# Patient Record
Sex: FEMALE | Race: WHITE | NOT HISPANIC OR LATINO
[De-identification: names, ages, dates, MRNs, and addresses within clinical notes are randomized per-mention and may not be internally consistent; named-entity substitution may affect disease eponyms.]

---

## 2017-06-05 ENCOUNTER — APPOINTMENT (OUTPATIENT)
Dept: INTERNAL MEDICINE | Facility: CLINIC | Age: 82
End: 2017-06-05

## 2017-10-06 ENCOUNTER — EMERGENCY (EMERGENCY)
Facility: HOSPITAL | Age: 82
LOS: 1 days | Discharge: HOME | End: 2017-10-06
Admitting: EMERGENCY MEDICINE
Payer: MEDICARE

## 2017-10-06 VITALS
DIASTOLIC BLOOD PRESSURE: 85 MMHG | RESPIRATION RATE: 18 BRPM | SYSTOLIC BLOOD PRESSURE: 142 MMHG | WEIGHT: 125 LBS | HEART RATE: 76 BPM | OXYGEN SATURATION: 96 % | TEMPERATURE: 99 F

## 2017-10-06 VITALS
RESPIRATION RATE: 18 BRPM | HEART RATE: 63 BPM | SYSTOLIC BLOOD PRESSURE: 174 MMHG | OXYGEN SATURATION: 95 % | DIASTOLIC BLOOD PRESSURE: 75 MMHG

## 2017-10-06 DIAGNOSIS — I10 ESSENTIAL (PRIMARY) HYPERTENSION: ICD-10-CM

## 2017-10-06 DIAGNOSIS — F17.200 NICOTINE DEPENDENCE, UNSPECIFIED, UNCOMPLICATED: ICD-10-CM

## 2017-10-06 DIAGNOSIS — Z79.1 LONG TERM (CURRENT) USE OF NON-STEROIDAL ANTI-INFLAMMATORIES (NSAID): ICD-10-CM

## 2017-10-06 DIAGNOSIS — Z88.5 ALLERGY STATUS TO NARCOTIC AGENT: ICD-10-CM

## 2017-10-06 DIAGNOSIS — Z96.7 PRESENCE OF OTHER BONE AND TENDON IMPLANTS: Chronic | ICD-10-CM

## 2017-10-06 DIAGNOSIS — R10.9 UNSPECIFIED ABDOMINAL PAIN: ICD-10-CM

## 2017-10-06 DIAGNOSIS — Z79.899 OTHER LONG TERM (CURRENT) DRUG THERAPY: ICD-10-CM

## 2017-10-06 DIAGNOSIS — R05 COUGH: ICD-10-CM

## 2017-10-06 DIAGNOSIS — Z88.8 ALLERGY STATUS TO OTHER DRUGS, MEDICAMENTS AND BIOLOGICAL SUBSTANCES: ICD-10-CM

## 2017-10-06 DIAGNOSIS — J34.89 OTHER SPECIFIED DISORDERS OF NOSE AND NASAL SINUSES: ICD-10-CM

## 2017-10-06 DIAGNOSIS — Z98.89 OTHER SPECIFIED POSTPROCEDURAL STATES: Chronic | ICD-10-CM

## 2017-10-06 DIAGNOSIS — Z98.49 CATARACT EXTRACTION STATUS, UNSPECIFIED EYE: Chronic | ICD-10-CM

## 2017-10-06 LAB
ALBUMIN SERPL ELPH-MCNC: 3.7 G/DL — SIGNIFICANT CHANGE UP (ref 3.4–5)
ALP SERPL-CCNC: 57 U/L — SIGNIFICANT CHANGE UP (ref 40–120)
ALT FLD-CCNC: 18 U/L — SIGNIFICANT CHANGE UP (ref 12–42)
ANION GAP SERPL CALC-SCNC: 2 MMOL/L — LOW (ref 9–16)
APPEARANCE UR: CLEAR — SIGNIFICANT CHANGE UP
AST SERPL-CCNC: 15 U/L — SIGNIFICANT CHANGE UP (ref 15–37)
BILIRUB SERPL-MCNC: 0.3 MG/DL — SIGNIFICANT CHANGE UP (ref 0.2–1.2)
BILIRUB UR-MCNC: NEGATIVE — SIGNIFICANT CHANGE UP
BUN SERPL-MCNC: 19 MG/DL — SIGNIFICANT CHANGE UP (ref 7–23)
CALCIUM SERPL-MCNC: 8.8 MG/DL — SIGNIFICANT CHANGE UP (ref 8.5–10.5)
CHLORIDE SERPL-SCNC: 104 MMOL/L — SIGNIFICANT CHANGE UP (ref 96–108)
CO2 SERPL-SCNC: 32 MMOL/L — HIGH (ref 22–31)
COLOR SPEC: YELLOW — SIGNIFICANT CHANGE UP
CREAT SERPL-MCNC: 0.71 MG/DL — SIGNIFICANT CHANGE UP (ref 0.5–1.3)
DIFF PNL FLD: NEGATIVE — SIGNIFICANT CHANGE UP
GLUCOSE SERPL-MCNC: 104 MG/DL — HIGH (ref 70–99)
GLUCOSE UR QL: NEGATIVE — SIGNIFICANT CHANGE UP
HCT VFR BLD CALC: 42.6 % — SIGNIFICANT CHANGE UP (ref 34.5–45)
HGB BLD-MCNC: 14 G/DL — SIGNIFICANT CHANGE UP (ref 11.5–15.5)
INR BLD: 0.96 — SIGNIFICANT CHANGE UP (ref 0.88–1.16)
KETONES UR-MCNC: NEGATIVE — SIGNIFICANT CHANGE UP
LACTATE SERPL-SCNC: 0.7 MMOL/L — SIGNIFICANT CHANGE UP (ref 0.4–2)
LEUKOCYTE ESTERASE UR-ACNC: NEGATIVE — SIGNIFICANT CHANGE UP
LIDOCAIN IGE QN: 233 U/L — SIGNIFICANT CHANGE UP (ref 73–393)
MCHC RBC-ENTMCNC: 31.3 PG — SIGNIFICANT CHANGE UP (ref 27–34)
MCHC RBC-ENTMCNC: 32.9 G/DL — SIGNIFICANT CHANGE UP (ref 32–36)
MCV RBC AUTO: 95.3 FL — SIGNIFICANT CHANGE UP (ref 80–100)
NITRITE UR-MCNC: NEGATIVE — SIGNIFICANT CHANGE UP
PH UR: 5.5 — SIGNIFICANT CHANGE UP (ref 5–8)
PLATELET # BLD AUTO: 194 K/UL — SIGNIFICANT CHANGE UP (ref 150–400)
POTASSIUM SERPL-MCNC: 4.2 MMOL/L — SIGNIFICANT CHANGE UP (ref 3.5–5.3)
POTASSIUM SERPL-SCNC: 4.2 MMOL/L — SIGNIFICANT CHANGE UP (ref 3.5–5.3)
PROT SERPL-MCNC: 8.3 G/DL — HIGH (ref 6.4–8.2)
PROT UR-MCNC: NEGATIVE MG/DL — SIGNIFICANT CHANGE UP
PROTHROM AB SERPL-ACNC: 10.6 SEC — SIGNIFICANT CHANGE UP (ref 9.8–12.7)
RBC # BLD: 4.47 M/UL — SIGNIFICANT CHANGE UP (ref 3.8–5.2)
RBC # FLD: 14.4 % — SIGNIFICANT CHANGE UP (ref 10.3–16.9)
SODIUM SERPL-SCNC: 138 MMOL/L — SIGNIFICANT CHANGE UP (ref 132–145)
SP GR SPEC: 1.02 — SIGNIFICANT CHANGE UP (ref 1–1.03)
UROBILINOGEN FLD QL: 0.2 E.U./DL — SIGNIFICANT CHANGE UP
WBC # BLD: 4.5 K/UL — SIGNIFICANT CHANGE UP (ref 3.8–10.5)
WBC # FLD AUTO: 4.5 K/UL — SIGNIFICANT CHANGE UP (ref 3.8–10.5)

## 2017-10-06 PROCEDURE — 74177 CT ABD & PELVIS W/CONTRAST: CPT | Mod: 26

## 2017-10-06 PROCEDURE — 71100 X-RAY EXAM RIBS UNI 2 VIEWS: CPT | Mod: 26,LT

## 2017-10-06 PROCEDURE — 99283 EMERGENCY DEPT VISIT LOW MDM: CPT

## 2017-10-06 RX ORDER — SODIUM CHLORIDE 9 MG/ML
3 INJECTION INTRAMUSCULAR; INTRAVENOUS; SUBCUTANEOUS ONCE
Qty: 0 | Refills: 0 | Status: COMPLETED | OUTPATIENT
Start: 2017-10-06 | End: 2017-10-06

## 2017-10-06 RX ORDER — IOHEXOL 300 MG/ML
50 INJECTION, SOLUTION INTRAVENOUS ONCE
Qty: 0 | Refills: 0 | Status: COMPLETED | OUTPATIENT
Start: 2017-10-06 | End: 2017-10-06

## 2017-10-06 RX ADMIN — SODIUM CHLORIDE 3 MILLILITER(S): 9 INJECTION INTRAMUSCULAR; INTRAVENOUS; SUBCUTANEOUS at 19:18

## 2017-10-06 RX ADMIN — IOHEXOL 50 MILLILITER(S): 300 INJECTION, SOLUTION INTRAVENOUS at 20:20

## 2017-10-06 NOTE — ED ADULT TRIAGE NOTE - CHIEF COMPLAINT QUOTE
states she fell yesterday at 3am- states she fell onto her backside with no injury to left sided rib pain- Pt also reports being on zpack for cold symptoms- Pt c/o left sided rib pain which worsens on exertion states she fell yesterday at 3am- states she fell onto her backside with no injury to left sided rib pain- Pt also reports being on zpack for cold symptoms- Pt c/o left sided rib pain which worsens on exertion- denies cp, sob

## 2017-10-06 NOTE — ED PROVIDER NOTE - MEDICAL DECISION MAKING DETAILS
(FAZAL Chaudhry) Pt signed out to me by VANNA paul at 20:00pm pending CT results. CTAP reveals no acute intra-abdominal pathology, however does reveal a segment of narrowed appearance in the   proximal descending colon which pt was informed of in which she verbalizes her understanding and she agrees to F/U with her Gastroenterologist on Monday ( in 2 days), as well as her PMD, for further evaluation of this finding. Pt was provided with her images on a CD as well as a print-out of the reading. She is A&Ox3. NAD. Sitting comfortably. Will D/C. Strict return precautions reviewed with pt in which pt verbalizes understanding and agrees to.

## 2017-10-06 NOTE — ED PROVIDER NOTE - PMH
Aortic stenosis    Aortic valve replaced    Essential hypertension    Hypothyroid    Osteoporosis    Tibial fracture

## 2017-10-06 NOTE — ED PROVIDER NOTE - OBJECTIVE STATEMENT
85y F with hx of osteoporosis, hypothyroidism, aortic stenosis, HTN, presents to ED c/o left flank pain s/p fall. Pt states she fell in the middle of the night on her way to the bathroom and landed on her buttocks. Pt got back in bed, but is feeling left flank pain now, which is worse when she coughs, takes deep breaths. Pt notes she started a z-pack yesterday for her cold sx. Pt states she also has runny nose, and productive cough, with white mucous. Pt states she had broken her ribs before, and is concerned this may be related. 85y F with hx of osteoporosis, hypothyroidism, aortic stenosis, HTN, allergic to morphine, Demerol, Tequin, indomethacin,  codeine, percodan, presents to ED c/o left flank pain s/p fall. Pt states she fell in the middle of the night on her way to the bathroom and landed on her buttocks. Pt got back in bed, but is feeling left flank pain now, which is worse when she coughs, takes deep breaths. Pt notes she started a z-pack yesterday for her cold sx. Pt states she also has runny nose, and productive cough, with white mucous. Pt states she had broken her ribs before, and is concerned this may be related. 85y F with hx of osteoporosis, hypothyroidism, aortic stenosis s/p AVR 2009 and partial intestinal resection for secondary peritonitis in 77, HTN presents to ED c/o left anterior and lateral chest wall pain s/p fall. Pt states she fell in the middle of the night on her way to the bathroom and landed on her buttocks. Pt got back in bed, but is feeling left chest wall pain now, which is worse when she coughs, takes deep breaths. Pt notes she started a z-pack yesterday for her cold sx. Pt states she also has runny nose, and productive cough, with white sputum. Pt states she had broken her ribs before, and is concerned this may be related.  She denies fevers/chills, n/v, abd pain, shortness of breath.

## 2017-10-07 NOTE — ED ADULT NURSE NOTE - CHIEF COMPLAINT QUOTE
states she fell yesterday at 3am- states she fell onto her backside with no injury to left sided rib pain- Pt also reports being on zpack for cold symptoms- Pt c/o left sided rib pain which worsens on exertion- denies cp, sob

## 2018-05-16 ENCOUNTER — EMERGENCY (EMERGENCY)
Facility: HOSPITAL | Age: 83
LOS: 1 days | Discharge: HOME | End: 2018-05-16
Admitting: EMERGENCY MEDICINE
Payer: MEDICARE

## 2018-05-16 VITALS
OXYGEN SATURATION: 94 % | TEMPERATURE: 98 F | WEIGHT: 123.9 LBS | DIASTOLIC BLOOD PRESSURE: 79 MMHG | SYSTOLIC BLOOD PRESSURE: 151 MMHG | HEART RATE: 90 BPM | RESPIRATION RATE: 18 BRPM

## 2018-05-16 DIAGNOSIS — Z96.7 PRESENCE OF OTHER BONE AND TENDON IMPLANTS: Chronic | ICD-10-CM

## 2018-05-16 DIAGNOSIS — Z88.8 ALLERGY STATUS TO OTHER DRUGS, MEDICAMENTS AND BIOLOGICAL SUBSTANCES: ICD-10-CM

## 2018-05-16 DIAGNOSIS — E03.9 HYPOTHYROIDISM, UNSPECIFIED: ICD-10-CM

## 2018-05-16 DIAGNOSIS — Z87.891 PERSONAL HISTORY OF NICOTINE DEPENDENCE: ICD-10-CM

## 2018-05-16 DIAGNOSIS — Z98.89 OTHER SPECIFIED POSTPROCEDURAL STATES: Chronic | ICD-10-CM

## 2018-05-16 DIAGNOSIS — Z88.5 ALLERGY STATUS TO NARCOTIC AGENT: ICD-10-CM

## 2018-05-16 DIAGNOSIS — Z98.49 CATARACT EXTRACTION STATUS, UNSPECIFIED EYE: Chronic | ICD-10-CM

## 2018-05-16 DIAGNOSIS — Z79.899 OTHER LONG TERM (CURRENT) DRUG THERAPY: ICD-10-CM

## 2018-05-16 DIAGNOSIS — I10 ESSENTIAL (PRIMARY) HYPERTENSION: ICD-10-CM

## 2018-05-16 DIAGNOSIS — K44.9 DIAPHRAGMATIC HERNIA WITHOUT OBSTRUCTION OR GANGRENE: ICD-10-CM

## 2018-05-16 DIAGNOSIS — R10.12 LEFT UPPER QUADRANT PAIN: ICD-10-CM

## 2018-05-16 PROCEDURE — 71046 X-RAY EXAM CHEST 2 VIEWS: CPT | Mod: 26

## 2018-05-16 PROCEDURE — 93010 ELECTROCARDIOGRAM REPORT: CPT | Mod: 77

## 2018-05-16 PROCEDURE — 99284 EMERGENCY DEPT VISIT MOD MDM: CPT | Mod: 25

## 2018-05-16 PROCEDURE — 93010 ELECTROCARDIOGRAM REPORT: CPT

## 2018-05-16 RX ORDER — TRAMADOL HYDROCHLORIDE 50 MG/1
50 TABLET ORAL ONCE
Qty: 0 | Refills: 0 | Status: DISCONTINUED | OUTPATIENT
Start: 2018-05-16 | End: 2018-05-16

## 2018-05-16 RX ADMIN — TRAMADOL HYDROCHLORIDE 50 MILLIGRAM(S): 50 TABLET ORAL at 18:29

## 2018-05-16 NOTE — ED PROVIDER NOTE - PHYSICAL EXAMINATION
Vital Signs - nursing notes reviewed and confirmed  Gen - WDWN elderly F, NAD, comfortable and non-toxic appearing, speaking in full sentences   Skin - warm, dry, intact  HEENT - AT/NC, airway patent, neck supple and NT, FROM  CV - S1S2, R/R/R, +3/6 systolic murmur noted   Resp - respiration non-labored, CTAB, symmetric bs b/l, no r/r/w  GI - NABS, soft, ND, NT, no rebound or guarding, no CVAT b/l, no palpable bulging mass noted   MS - w/w/p, no c/c/e, calves supple and NT, distal pulses symmetric b/l   Neuro - AxOx3, no focal neuro deficits, ambulatory without gait disturbance

## 2018-05-16 NOTE — ED PROVIDER NOTE - MEDICAL DECISION MAKING DETAILS
AFVSS at time of d/c, pt non-toxic appearing, results, ddx, and f/u plans discussed with pt at bedside, d/c'd home to f/u with PMD, strict return precautions discussed, prompt return to ER for any worsening or new sx, pt verbalized understanding. pt with h/o hiatal hernia, noted intermittent pain in the epigastric/LUQ due to coughing and bulging of hernia, pain resolved in the ED, CXR and EKG wnl, pt reports feeling better and wants to f/u with GI, no palpable mass or discomfort on exam, well appearing, d/c'd home to f/u with PMD and GI, strict return precautions discussed, prompt return to ER for any worsening or new sx, pt verbalized understanding.

## 2018-05-16 NOTE — ED ADULT TRIAGE NOTE - CHIEF COMPLAINT QUOTE
c/o sudden onset of b/l rib pain x 30 min with SOB when inhaling hard. states pain is worse where her hernia is. pt is speaking in full sentences, in NAD.

## 2018-05-16 NOTE — ED PROVIDER NOTE - DIAGNOSTIC INTERPRETATION
Xray (wet reads) interpreted by DENVER VALENZUELA   CXR - Cardiac silhouette, aortic knob, mediastinal and hilar contours appear wnl, no acute consolidation, infiltrate, effusion, or PTX. No bony abnormalities noted

## 2018-05-16 NOTE — ED ADULT NURSE NOTE - OBJECTIVE STATEMENT
Pt states she has a hernia by her left side, area under her breast. Reports its hard to take a deep breath. hx of cardiac valve replacement.

## 2018-05-16 NOTE — ED PROVIDER NOTE - OBJECTIVE STATEMENT
86 yo F 84 yo F with PMHx of Aortic stenosis, s/p AVR, HTN, osteoporosis, hypothyroid, recently found to have gastric hernia, s/p EGD/Colonoscopy 1/2018, intermittently getting pain in the ribcage region with coughing due to hernia, presenting today c/o worsening pain 1 hr PTA to the ED. Pt reports walking briskly and noted sudden onset of pain in the LUQ/epigastric region after coughing and sneezing. Noted slight bulging in the region and feels like her hernia type of pain and couldn't make it home.  But pain resolved spontaneously upon arrival to the ED.  Currently reports no active sx except slight discomfort on deep inspiration. Denies fever, chills, CP, trauma, fall, SOB, ROA, palpitations, HA, dizziness, focal weakness, rash, numbness, tingling, abdominal pain, N/V/D/C, and malaise.

## 2018-05-16 NOTE — ED PROVIDER NOTE - PSH
S/P arthroscopic knee surgery    S/P cataract surgery    S/P ORIF (open reduction internal fixation) fracture

## 2018-07-29 ENCOUNTER — EMERGENCY (EMERGENCY)
Facility: HOSPITAL | Age: 83
LOS: 1 days | Discharge: HOME | End: 2018-07-29
Attending: EMERGENCY MEDICINE | Admitting: EMERGENCY MEDICINE
Payer: MEDICARE

## 2018-07-29 VITALS
HEART RATE: 85 BPM | DIASTOLIC BLOOD PRESSURE: 83 MMHG | TEMPERATURE: 98 F | RESPIRATION RATE: 15 BRPM | OXYGEN SATURATION: 95 % | WEIGHT: 126.1 LBS | SYSTOLIC BLOOD PRESSURE: 146 MMHG

## 2018-07-29 DIAGNOSIS — Z98.49 CATARACT EXTRACTION STATUS, UNSPECIFIED EYE: Chronic | ICD-10-CM

## 2018-07-29 DIAGNOSIS — Z96.7 PRESENCE OF OTHER BONE AND TENDON IMPLANTS: Chronic | ICD-10-CM

## 2018-07-29 DIAGNOSIS — Z98.89 OTHER SPECIFIED POSTPROCEDURAL STATES: Chronic | ICD-10-CM

## 2018-07-29 PROCEDURE — 99284 EMERGENCY DEPT VISIT MOD MDM: CPT

## 2018-07-29 PROCEDURE — 72192 CT PELVIS W/O DYE: CPT | Mod: 26

## 2018-07-29 RX ORDER — LIDOCAINE 4 G/100G
1 CREAM TOPICAL ONCE
Qty: 0 | Refills: 0 | Status: DISCONTINUED | OUTPATIENT
Start: 2018-07-29 | End: 2018-07-29

## 2018-07-29 NOTE — ED PROVIDER NOTE - MEDICAL DECISION MAKING DETAILS
well appearing pt, no trauma/fall, hx of bone harvesting to L iliac bone, no fever, no urinary sx, will ct pelvis eval for acute pathology, also could be referred pain from L hip though clinically bearing weight, low suspicion for occult fx, low suspicion for cord compression, no evidence of cellulitis or acute infectious process, if CT no acute findings, can follow up with PMD/orthopedics

## 2018-07-29 NOTE — ED PROVIDER NOTE - OBJECTIVE STATEMENT
85 yof pw left lower back pain which began yesterday, has had several episodes in the last 2-3 years.  sp iliac bone harvest in 90s for tibfib fx per patient.  pt came in this time b/c she's scheduled for surg on 8/2/18 and inquire whether or not this pain will affect her surg.  no fall, no trauma, no fever, no abd pain.  ambulates w/ single crutch, able to perform ADL, able to bend down.  no urinary sx.

## 2018-07-29 NOTE — ED PROVIDER NOTE - PHYSICAL EXAMINATION
CON: ao x 3, HENMT: clear oropharynx, soft neck, HEAD: atraumatic, SKIN: no rash, no erythema, no induration/fluctuance over tenderness, MSK: no deformities, tenderness over L iliac bone, noted indentation, no crepitus, pelvis stable, full active/passive bl hip ROM, steady gait, able to bend down CON: ao x 3, HENMT: clear oropharynx, soft neck, HEAD: atraumatic, SKIN: no rash, no erythema, no induration/fluctuance over tenderness, MSK: no deformities, tenderness over L iliac bone medial aspect, noted surgical scar, no crepitus, pelvis stable, full active/passive bl hip ROM, steady gait, able to bend down

## 2018-07-29 NOTE — ED ADULT TRIAGE NOTE - CHIEF COMPLAINT QUOTE
PT reports left lower back pain from site where she had bone removed from a surgery years ago. Pt states she has this pain intermittently but worse today.

## 2018-07-29 NOTE — ED PROVIDER NOTE - PROGRESS NOTE DETAILS
pt inquiring nature of her ongoing sx and regarding upcoming surg, though noted allergy to codeine, pt states has taken tramadol in the past w/o allergic rx

## 2018-07-30 VITALS
HEART RATE: 71 BPM | RESPIRATION RATE: 18 BRPM | DIASTOLIC BLOOD PRESSURE: 82 MMHG | TEMPERATURE: 99 F | OXYGEN SATURATION: 99 % | SYSTOLIC BLOOD PRESSURE: 136 MMHG

## 2018-07-30 RX ORDER — LIDOCAINE 4 G/100G
1 CREAM TOPICAL ONCE
Qty: 0 | Refills: 0 | Status: DISCONTINUED | OUTPATIENT
Start: 2018-07-30 | End: 2018-08-02

## 2018-07-30 RX ORDER — LIDOCAINE 4 G/100G
1 CREAM TOPICAL
Qty: 30 | Refills: 0
Start: 2018-07-30 | End: 2018-08-28

## 2018-07-30 RX ORDER — TRAMADOL HYDROCHLORIDE 50 MG/1
0.5 TABLET ORAL
Qty: 3 | Refills: 0
Start: 2018-07-30 | End: 2018-08-01

## 2018-08-02 DIAGNOSIS — Z79.899 OTHER LONG TERM (CURRENT) DRUG THERAPY: ICD-10-CM

## 2018-08-02 DIAGNOSIS — M54.5 LOW BACK PAIN: ICD-10-CM

## 2018-08-02 DIAGNOSIS — E03.9 HYPOTHYROIDISM, UNSPECIFIED: ICD-10-CM

## 2018-08-02 DIAGNOSIS — Z88.5 ALLERGY STATUS TO NARCOTIC AGENT: ICD-10-CM

## 2018-08-02 DIAGNOSIS — Z88.8 ALLERGY STATUS TO OTHER DRUGS, MEDICAMENTS AND BIOLOGICAL SUBSTANCES: ICD-10-CM

## 2019-08-17 ENCOUNTER — EMERGENCY (EMERGENCY)
Facility: HOSPITAL | Age: 84
LOS: 1 days | Discharge: HOME | End: 2019-08-17
Admitting: EMERGENCY MEDICINE
Payer: MEDICARE

## 2019-08-17 VITALS
OXYGEN SATURATION: 94 % | RESPIRATION RATE: 15 BRPM | TEMPERATURE: 98 F | DIASTOLIC BLOOD PRESSURE: 56 MMHG | SYSTOLIC BLOOD PRESSURE: 130 MMHG | HEART RATE: 72 BPM

## 2019-08-17 DIAGNOSIS — Z96.7 PRESENCE OF OTHER BONE AND TENDON IMPLANTS: Chronic | ICD-10-CM

## 2019-08-17 DIAGNOSIS — Z98.89 OTHER SPECIFIED POSTPROCEDURAL STATES: Chronic | ICD-10-CM

## 2019-08-17 DIAGNOSIS — Z98.49 CATARACT EXTRACTION STATUS, UNSPECIFIED EYE: Chronic | ICD-10-CM

## 2019-08-17 PROCEDURE — 99283 EMERGENCY DEPT VISIT LOW MDM: CPT

## 2019-08-17 RX ORDER — BACITRACIN ZINC 500 UNIT/G
1 OINTMENT IN PACKET (EA) TOPICAL ONCE
Refills: 0 | Status: COMPLETED | OUTPATIENT
Start: 2019-08-17 | End: 2019-08-17

## 2019-08-17 RX ORDER — TETANUS TOXOID, REDUCED DIPHTHERIA TOXOID AND ACELLULAR PERTUSSIS VACCINE, ADSORBED 5; 2.5; 8; 8; 2.5 [IU]/.5ML; [IU]/.5ML; UG/.5ML; UG/.5ML; UG/.5ML
0.5 SUSPENSION INTRAMUSCULAR ONCE
Refills: 0 | Status: COMPLETED | OUTPATIENT
Start: 2019-08-17 | End: 2019-08-17

## 2019-08-17 RX ADMIN — Medication 1 TABLET(S): at 01:42

## 2019-08-17 RX ADMIN — Medication 1 APPLICATION(S): at 01:41

## 2019-08-17 RX ADMIN — TETANUS TOXOID, REDUCED DIPHTHERIA TOXOID AND ACELLULAR PERTUSSIS VACCINE, ADSORBED 0.5 MILLILITER(S): 5; 2.5; 8; 8; 2.5 SUSPENSION INTRAMUSCULAR at 02:11

## 2019-08-17 NOTE — ED PROVIDER NOTE - NSFOLLOWUPINSTRUCTIONS_ED_ALL_ED_FT
-PLEASE FOLLOW-UP WITH YOUR PRIMARY CARE DOCTOR IN 1-2 DAYS.  BRING ALL PAPERWORK FROM TODAY'S VISIT TO YOUR FOLLOW-UP VISIT.  IF YOU DO NOT HAVE A PRIMARY CARE DOCTOR PLEASE REFER TO THE OFFICE/CLINIC INFORMATION GIVEN ABOVE.  YOU MAY ALSO CALL [phone] AND ASK FOR MS. LULY ROBLERO.  SHE CAN HELP YOU MAKE A FOLLOW-UP APPOINTMENT.  HER HOURS ARE 11AM-7PM MONDAY - FRIDAY.  -TAKE AUGMENTIN AS DIRECTED.  -PLEASE RETURN TO THE ER IMMEDIATELY OR CALL 911 FOR ANY HIGH FEVER, TROUBLE BREATHING, VOMITING, SEVERE PAIN, OR ANY OTHER CONCERNS.     Animal Bite    Animal bites can range from mild to serious. An animal bite can result in a scratch on the skin, a deep open cut, a puncture of the skin, a crush injury, or tearing away of the skin or a body part. Treatment includes wound care, updating your tetanus shot, and in some cases, administering a rabies vaccine. If you were prescribed an antibiotic, take it as told by your health care provider. Do not stop using the antibiotic even if your condition improves.      SEEK IMMEDIATE MEDICAL CARE IF YOU HAVE ANY OF THE FOLLOWING SYMPTOMS: red streaking away from the wound, fluid/blood/pus coming from the wound, fever or chills, trouble moving the injured area, numbness or tingling extending beyond the wound.    Cellulitis    Cellulitis is a skin infection caused by bacteria. This condition occurs most often in the arms and lower legs but can occur anywhere over the body. Symptoms include redness, swelling, warm skin, tenderness, and chills/fever. If you were prescribed an antibiotic medicine, take it as told by your health care provider. Do not stop taking the antibiotic even if you start to feel better.    SEEK IMMEDIATE MEDICAL CARE IF YOU HAVE ANY OF THE FOLLOWING SYMPTOMS: worsening fever, red streaks coming from affected area, vomiting or diarrhea, or dizziness/lightheadedness.

## 2019-08-17 NOTE — ED PROVIDER NOTE - PHYSICAL EXAMINATION
VITAL SIGNS: I have reviewed nursing notes and confirm.  CONSTITUTIONAL: Well-developed; well-nourished; in no acute distress.  SKIN: +[id number] circular area to posterior R forearm of erythema, edema, and mild warmth. Mild TTP to deep palpation. No crepitus, fluctuance, discharge. +puncture wound centrally. FROM of elbow/wrist/hand. 5/5 hand . sensation intact. +2 distal pulses. <2 sec capillary refill.   HEAD: Normocephalic; atraumatic.  EYES: PERRL, EOM intact; conjunctiva and sclera clear.  ENT: No nasal discharge; airway clear.  NECK: Supple; non tender.  CARD: S1, S2 normal; no murmurs, gallops, or rubs. Regular rate and rhythm.  RESP: No wheezes, rales or rhonchi.  ABD: Normal bowel sounds; soft; non-distended; non-tender; no hepatosplenomegaly.  EXT: Normal ROM. No clubbing, cyanosis or edema.  NEURO: Alert, oriented. Grossly unremarkable.  PSYCH: Cooperative, appropriate.

## 2019-08-17 NOTE — ED PROVIDER NOTE - CLINICAL SUMMARY MEDICAL DECISION MAKING FREE TEXT BOX
Cat bite 2 days ago-pt owns cat and it is up-to-date on vaccinations. Last tetanus 2007- will; update today. +mild cellulitis surrounding puncture wound to R forearm. No streaking or signs of systemic infection. Will Give Augmentin and DC with strict return precautions. Pt to f/u with PCP on monday.

## 2019-08-17 NOTE — ED PROVIDER NOTE - OBJECTIVE STATEMENT
88 y/o   pmhx: aortic valve replacement, HTN, hypothyroid    Pt presents with c/o cat bite to R forearm 2 days ago. Pt states it is her cat- and he is up-to-date on his vaccinations. Now with some erythema, edema, warmth and very mild tenderness to R forearm. Visible puncture wound. denies weakness, numbness, fevers, chills. No other medical complaint

## 2019-08-17 NOTE — ED PROVIDER NOTE - CARE PLAN
Principal Discharge DX:	Cat bite, initial encounter  Secondary Diagnosis:	Cellulitis of right upper extremity

## 2019-08-17 NOTE — ED ADULT TRIAGE NOTE - CHIEF COMPLAINT QUOTE
Pt with complaint of a bite to the right lower arm from her cat. Noted to have swelling to affected area.

## 2019-08-17 NOTE — ED ADULT NURSE NOTE - NSIMPLEMENTINTERV_GEN_ALL_ED
Implemented All Universal Safety Interventions:  [place] to call system. Call bell, personal items and telephone within reach. Instruct patient to call for assistance. Room bathroom lighting operational. Non-slip footwear when patient is off stretcher. Physically safe environment: no spills, clutter or unnecessary equipment. Stretcher in lowest position, wheels locked, appropriate side rails in place.

## 2019-08-22 DIAGNOSIS — S51.851A OPEN BITE OF RIGHT FOREARM, INITIAL ENCOUNTER: ICD-10-CM

## 2019-08-22 DIAGNOSIS — Z23 ENCOUNTER FOR IMMUNIZATION: ICD-10-CM

## 2019-08-22 DIAGNOSIS — L03.113 CELLULITIS OF RIGHT UPPER LIMB: ICD-10-CM
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
## 2019-11-23 ENCOUNTER — INPATIENT (INPATIENT)
Facility: HOSPITAL | Age: 84
LOS: 3 days | Discharge: HOME | DRG: 286 | End: 2019-11-27
Attending: INTERNAL MEDICINE | Admitting: INTERNAL MEDICINE
Payer: MEDICARE

## 2019-11-23 VITALS
TEMPERATURE: 98 F | SYSTOLIC BLOOD PRESSURE: 115 MMHG | RESPIRATION RATE: 17 BRPM | OXYGEN SATURATION: 96 % | HEIGHT: 62 IN | HEART RATE: 200 BPM | WEIGHT: 130.07 LBS | DIASTOLIC BLOOD PRESSURE: 60 MMHG

## 2019-11-23 DIAGNOSIS — Z95.3 PRESENCE OF XENOGENIC HEART VALVE: ICD-10-CM

## 2019-11-23 DIAGNOSIS — I48.4 ATYPICAL ATRIAL FLUTTER: ICD-10-CM

## 2019-11-23 DIAGNOSIS — Z98.89 OTHER SPECIFIED POSTPROCEDURAL STATES: Chronic | ICD-10-CM

## 2019-11-23 DIAGNOSIS — Z95.2 PRESENCE OF PROSTHETIC HEART VALVE: Chronic | ICD-10-CM

## 2019-11-23 DIAGNOSIS — R79.1 ABNORMAL COAGULATION PROFILE: ICD-10-CM

## 2019-11-23 DIAGNOSIS — Z96.7 PRESENCE OF OTHER BONE AND TENDON IMPLANTS: Chronic | ICD-10-CM

## 2019-11-23 DIAGNOSIS — Z98.49 CATARACT EXTRACTION STATUS, UNSPECIFIED EYE: Chronic | ICD-10-CM

## 2019-11-23 DIAGNOSIS — M81.0 AGE-RELATED OSTEOPOROSIS WITHOUT CURRENT PATHOLOGICAL FRACTURE: ICD-10-CM

## 2019-11-23 LAB
ALBUMIN SERPL ELPH-MCNC: 3.3 G/DL — LOW (ref 3.4–5)
ALP SERPL-CCNC: 54 U/L — SIGNIFICANT CHANGE UP (ref 40–120)
ALT FLD-CCNC: 58 U/L — HIGH (ref 12–42)
ANION GAP SERPL CALC-SCNC: 15 MMOL/L — SIGNIFICANT CHANGE UP (ref 9–16)
APTT BLD: 28.9 SEC — SIGNIFICANT CHANGE UP (ref 27.5–36.3)
AST SERPL-CCNC: 94 U/L — HIGH (ref 15–37)
BASOPHILS # BLD AUTO: 0.04 K/UL — SIGNIFICANT CHANGE UP (ref 0–0.2)
BASOPHILS NFR BLD AUTO: 0.4 % — SIGNIFICANT CHANGE UP (ref 0–2)
BILIRUB SERPL-MCNC: 0.3 MG/DL — SIGNIFICANT CHANGE UP (ref 0.2–1.2)
BUN SERPL-MCNC: 35 MG/DL — HIGH (ref 7–23)
CALCIUM SERPL-MCNC: 9.5 MG/DL — SIGNIFICANT CHANGE UP (ref 8.5–10.5)
CHLORIDE SERPL-SCNC: 110 MMOL/L — HIGH (ref 96–108)
CO2 SERPL-SCNC: 23 MMOL/L — SIGNIFICANT CHANGE UP (ref 22–31)
CREAT SERPL-MCNC: 1.32 MG/DL — HIGH (ref 0.5–1.3)
EOSINOPHIL # BLD AUTO: 0.02 K/UL — SIGNIFICANT CHANGE UP (ref 0–0.5)
EOSINOPHIL NFR BLD AUTO: 0.2 % — SIGNIFICANT CHANGE UP (ref 0–6)
GLUCOSE SERPL-MCNC: 173 MG/DL — HIGH (ref 70–99)
HCT VFR BLD CALC: 39.1 % — SIGNIFICANT CHANGE UP (ref 34.5–45)
HGB BLD-MCNC: 13.1 G/DL — SIGNIFICANT CHANGE UP (ref 11.5–15.5)
IMM GRANULOCYTES NFR BLD AUTO: 0.3 % — SIGNIFICANT CHANGE UP (ref 0–1.5)
INR BLD: 0.95 — SIGNIFICANT CHANGE UP (ref 0.88–1.16)
LYMPHOCYTES # BLD AUTO: 1.11 K/UL — SIGNIFICANT CHANGE UP (ref 1–3.3)
LYMPHOCYTES # BLD AUTO: 11 % — LOW (ref 13–44)
MAGNESIUM SERPL-MCNC: 1.9 MG/DL — SIGNIFICANT CHANGE UP (ref 1.6–2.6)
MCHC RBC-ENTMCNC: 33.2 PG — SIGNIFICANT CHANGE UP (ref 27–34)
MCHC RBC-ENTMCNC: 33.5 GM/DL — SIGNIFICANT CHANGE UP (ref 32–36)
MCV RBC AUTO: 99 FL — SIGNIFICANT CHANGE UP (ref 80–100)
MONOCYTES # BLD AUTO: 0.51 K/UL — SIGNIFICANT CHANGE UP (ref 0–0.9)
MONOCYTES NFR BLD AUTO: 5 % — SIGNIFICANT CHANGE UP (ref 2–14)
NEUTROPHILS # BLD AUTO: 8.4 K/UL — HIGH (ref 1.8–7.4)
NEUTROPHILS NFR BLD AUTO: 83.1 % — HIGH (ref 43–77)
NRBC # BLD: 0 /100 WBCS — SIGNIFICANT CHANGE UP (ref 0–0)
NT-PROBNP SERPL-SCNC: 597 PG/ML — HIGH
PLATELET # BLD AUTO: 173 K/UL — SIGNIFICANT CHANGE UP (ref 150–400)
POTASSIUM SERPL-MCNC: 4.2 MMOL/L — SIGNIFICANT CHANGE UP (ref 3.5–5.3)
POTASSIUM SERPL-SCNC: 4.2 MMOL/L — SIGNIFICANT CHANGE UP (ref 3.5–5.3)
PROT SERPL-MCNC: 7.2 G/DL — SIGNIFICANT CHANGE UP (ref 6.4–8.2)
PROTHROM AB SERPL-ACNC: 10.4 SEC — SIGNIFICANT CHANGE UP (ref 10–12.9)
RBC # BLD: 3.95 M/UL — SIGNIFICANT CHANGE UP (ref 3.8–5.2)
RBC # FLD: 13.7 % — SIGNIFICANT CHANGE UP (ref 10.3–14.5)
SODIUM SERPL-SCNC: 148 MMOL/L — HIGH (ref 132–145)
TROPONIN I SERPL-MCNC: 1.14 NG/ML — CRITICAL HIGH (ref 0.02–0.06)
TSH SERPL-MCNC: 0.17 UIU/ML — LOW (ref 0.36–3.74)
WBC # BLD: 10.11 K/UL — SIGNIFICANT CHANGE UP (ref 3.8–10.5)
WBC # FLD AUTO: 10.11 K/UL — SIGNIFICANT CHANGE UP (ref 3.8–10.5)

## 2019-11-23 PROCEDURE — 99292 CRITICAL CARE ADDL 30 MIN: CPT

## 2019-11-23 PROCEDURE — 71045 X-RAY EXAM CHEST 1 VIEW: CPT | Mod: 26

## 2019-11-23 PROCEDURE — 99222 1ST HOSP IP/OBS MODERATE 55: CPT

## 2019-11-23 PROCEDURE — 99291 CRITICAL CARE FIRST HOUR: CPT

## 2019-11-23 PROCEDURE — 93010 ELECTROCARDIOGRAM REPORT: CPT

## 2019-11-23 RX ORDER — DILTIAZEM HCL 120 MG
60 CAPSULE, EXT RELEASE 24 HR ORAL ONCE
Refills: 0 | Status: COMPLETED | OUTPATIENT
Start: 2019-11-23 | End: 2019-11-23

## 2019-11-23 RX ORDER — HEPARIN SODIUM 5000 [USP'U]/ML
3500 INJECTION INTRAVENOUS; SUBCUTANEOUS EVERY 6 HOURS
Refills: 0 | Status: DISCONTINUED | OUTPATIENT
Start: 2019-11-23 | End: 2019-11-24

## 2019-11-23 RX ORDER — DILTIAZEM HCL 120 MG
10 CAPSULE, EXT RELEASE 24 HR ORAL ONCE
Refills: 0 | Status: COMPLETED | OUTPATIENT
Start: 2019-11-23 | End: 2019-11-23

## 2019-11-23 RX ORDER — TRAMADOL HYDROCHLORIDE 50 MG/1
50 TABLET ORAL ONCE
Refills: 0 | Status: DISCONTINUED | OUTPATIENT
Start: 2019-11-23 | End: 2019-11-23

## 2019-11-23 RX ORDER — HEPARIN SODIUM 5000 [USP'U]/ML
INJECTION INTRAVENOUS; SUBCUTANEOUS
Qty: 25000 | Refills: 0 | Status: DISCONTINUED | OUTPATIENT
Start: 2019-11-23 | End: 2019-11-24

## 2019-11-23 RX ORDER — DILTIAZEM HCL 120 MG
20 CAPSULE, EXT RELEASE 24 HR ORAL ONCE
Refills: 0 | Status: COMPLETED | OUTPATIENT
Start: 2019-11-23 | End: 2019-11-23

## 2019-11-23 RX ORDER — HEPARIN SODIUM 5000 [USP'U]/ML
3500 INJECTION INTRAVENOUS; SUBCUTANEOUS ONCE
Refills: 0 | Status: COMPLETED | OUTPATIENT
Start: 2019-11-23 | End: 2019-11-23

## 2019-11-23 RX ORDER — AMIODARONE HYDROCHLORIDE 400 MG/1
1 TABLET ORAL
Qty: 900 | Refills: 0 | Status: DISCONTINUED | OUTPATIENT
Start: 2019-11-23 | End: 2019-11-25

## 2019-11-23 RX ADMIN — AMIODARONE HYDROCHLORIDE 600 MILLIGRAM(S): 400 TABLET ORAL at 21:30

## 2019-11-23 RX ADMIN — ADENOSINE 6 MILLIGRAM(S): 3 INJECTION INTRAVENOUS at 21:24

## 2019-11-23 RX ADMIN — TRAMADOL HYDROCHLORIDE 50 MILLIGRAM(S): 50 TABLET ORAL at 22:15

## 2019-11-23 RX ADMIN — TRAMADOL HYDROCHLORIDE 50 MILLIGRAM(S): 50 TABLET ORAL at 23:00

## 2019-11-23 RX ADMIN — Medication 20 MILLIGRAM(S): at 22:45

## 2019-11-23 RX ADMIN — HEPARIN SODIUM 700 UNIT(S)/HR: 5000 INJECTION INTRAVENOUS; SUBCUTANEOUS at 22:59

## 2019-11-23 RX ADMIN — Medication 10 MILLIGRAM(S): at 21:36

## 2019-11-23 RX ADMIN — Medication 60 MILLIGRAM(S): at 22:35

## 2019-11-23 RX ADMIN — HEPARIN SODIUM 3500 UNIT(S): 5000 INJECTION INTRAVENOUS; SUBCUTANEOUS at 22:56

## 2019-11-23 RX ADMIN — ADENOSINE 12 MILLIGRAM(S): 3 INJECTION INTRAVENOUS at 21:27

## 2019-11-23 NOTE — ED PROVIDER NOTE - ST/T WAVE
No ST/T wave elevations Nonspecific ST and T wave elevation. Questionable ST depression to Lead II and III, poor baseline poor baseline. Nonspecific ST/T wave elevations.

## 2019-11-23 NOTE — ED PROVIDER NOTE - CARE PLAN
Principal Discharge DX:	SVT (supraventricular tachycardia)  Secondary Diagnosis:	H/O aortic valve replacement  Secondary Diagnosis:	Atrial fibrillation with rapid ventricular response

## 2019-11-23 NOTE — ED ADULT NURSE NOTE - OBJECTIVE STATEMENT
87y female presents to ED c/o tachycardia in 200's. Given [id number] of adenosine en route my EMS without improvement to HR. On arrival, patient c/o L sided CP radiating to R chest and pain in bilateral arms. Placed on cardiac telemetry and cont pulse ox, provider and RN at bedside. 87y female presents to ED c/o SVT that began around 530p tonight. Given 9kw-[id number]-20cm of adenosine en route my EMS without improvement to HR. Hx of aortic valve repair x10 years ago. On arrival, patient c/o L sided CP radiating to R chest, pain in bilateral arms and generalized abdomen. Placed on cardiac telemetry and cont pulse ox, provider and RN at bedside.

## 2019-11-23 NOTE — ED PROVIDER NOTE - PROGRESS NOTE DETAILS
on arrival. /60. Adenosine 6mg push given. , /60, SPO2 96% Adenosine 12mg push given. Vagal maneuvers attempted. , /66, SPO2 96%. Amnion administered. , /66, SPO2  94%. HR has increased to 168 bpm. , /66, SPO2 90% Spoke to cardiologist, Dr. Adame to discuss pt's case. Recommended Cardizem 10mg push and that a Cardizem drip be started. Cardizem 10mg push given. Cardizem IV drip started. HR 93, /54, SPO2 97% Pt states she is feeling about the same since initial onset of sx. Still complaining of chest pain. States that she is not allergic to Tramadol. Will give Tramadol for pain. Amiodarone bolus administered. Cardizem IV drip started. HR 93, /54, SPO2 97%. Spoke to [facility] transfer center and cardiologist, Dr. Abebe, to discuss case. Recommended pt be started on a Heparin drip. Spoke to cardiologist, Dr. Adame to discuss pt's case. Recommended Cardizem. HR 93, /54, SPO2 97%. Spoke to [facility] transfer center and cardiologist, Dr. Abebe, to discuss case. Recommended pt be started on a Heparin drip, amiodarone drip. pt noted to be in SVT, given cardizem 20mg IVP. Rate improved to 100-110 AFIB. /74. Stable for transfer. signed out to cardiology fellow.

## 2019-11-23 NOTE — ED PROVIDER NOTE - PSH
H/O aortic valve replacement    S/P arthroscopic knee surgery    S/P cataract surgery    S/P ORIF (open reduction internal fixation) fracture

## 2019-11-23 NOTE — ED ADULT TRIAGE NOTE - CHIEF COMPLAINT QUOTE
PT presents to ED biba as notification for SVT with heart rate in 200's. IV adenosine 12mg, 6mg, 6mg given en route by EMS without improvement. Pt immediately placed in exam room with ER attending and RN at bedside, EKG in progress.

## 2019-11-23 NOTE — ED ADULT TRIAGE NOTE - IDEAL BODY WEIGHT(KG)
"Pt states \"I was trying to hook up a tractor and I fell off, a bolt caught the back of my left calf.\"     Duyen Neal, RN  10/12/19 7737    "
Left leg lac dressed with triple antibiotic ointment and kerlix     Suzie Kendrick  10/12/19 [id number]    
50

## 2019-11-23 NOTE — ED PROVIDER NOTE - CONSTITUTIONAL, MLM
normal... Frail, elderly female, uncomfortable appearing, well nourished, awake, alert, oriented to person, place, time/situation and in no apparent distress.

## 2019-11-23 NOTE — ED PROVIDER NOTE - CHPI ED SYMPTOMS NEG
no fever/no nausea/no syncope/no chills/no shortness of breath/no vomiting/no dizziness/no numbness, no tingling, no aphasia, no headache

## 2019-11-23 NOTE — ED PROVIDER NOTE - OBJECTIVE STATEMENT
87 y o female with PMHX of aortic valve replacement (December 21, 2010), HTN, and hypothyroid was BIBA for SVT that started at around 5:30pm today. HR in the 200's on the field. Adenosine 12mg, 6mg, 6mg given en route by EMS without improvement. Pt states that she is usually not aware of her heartbeat but endorses feeling sudden pain across her chest, posterior shoulders, arms, and abdomen. Notes associated cold sweat. Denies other cardiac hx or risk factors. Denies blood thinner use. Pt believes that she has had a constant increased HR since initial onset. Denies N/V/D, headache, syncope, SOB, numbness, tingling, aphasia, or other sx. Denies other cardiac hx or risk factors.

## 2019-11-23 NOTE — ED ADULT NURSE REASSESSMENT NOTE - NS ED NURSE REASSESS COMMENT FT1
6mg-12mg IV adenosine given without improvement to HR, remains in 190-200's. MD [place]de aware, amiodarone 150mg drip initiated per order. Patient alert and oriented, speaking full sentences, breathing equal bilaterally. cardiac telemetry maintained.

## 2019-11-23 NOTE — ED ADULT NURSE NOTE - NSIMPLEMENTINTERV_GEN_ALL_ED
Implemented All Fall Risk Interventions:  [place] to call system. Call bell, personal items and telephone within reach. Instruct patient to call for assistance. Room bathroom lighting operational. Non-slip footwear when patient is off stretcher. Physically safe environment: no spills, clutter or unnecessary equipment. Stretcher in lowest position, wheels locked, appropriate side rails in place. Provide visual cue, wrist band, yellow gown, etc. Monitor gait and stability. Monitor for mental status changes and reorient to person, place, and time. Review medications for side effects contributing to fall risk. Reinforce activity limits and safety measures with patient and family.

## 2019-11-23 NOTE — ED ADULT NURSE REASSESSMENT NOTE - NS ED NURSE REASSESS COMMENT FT1
Patient maintained on cardiac telemetry, HR in 190's. Patient speaking full sentences, endorsing chest pain and bilateral arm pain partially relieved s/p tramadol. MD at bedside, 20mg of cardizem given, amiodarone drip initiated.

## 2019-11-23 NOTE — ED PROVIDER NOTE - CLINICAL SUMMARY MEDICAL DECISION MAKING FREE TEXT BOX
87 y.o. female  with h/o AVR 10 years ago with SVT, converted to AFIB with RVR, responded to pharmacologic cardioversion in ED. Accepted for admission to cardiology by dr hernandez, heparin drip, amio drip in ED, cardizem PRN.

## 2019-11-24 DIAGNOSIS — N17.9 ACUTE KIDNEY FAILURE, UNSPECIFIED: ICD-10-CM

## 2019-11-24 DIAGNOSIS — Z91.89 OTHER SPECIFIED PERSONAL RISK FACTORS, NOT ELSEWHERE CLASSIFIED: ICD-10-CM

## 2019-11-24 DIAGNOSIS — R63.8 OTHER SYMPTOMS AND SIGNS CONCERNING FOOD AND FLUID INTAKE: ICD-10-CM

## 2019-11-24 DIAGNOSIS — I24.9 ACUTE ISCHEMIC HEART DISEASE, UNSPECIFIED: ICD-10-CM

## 2019-11-24 DIAGNOSIS — I48.91 UNSPECIFIED ATRIAL FIBRILLATION: ICD-10-CM

## 2019-11-24 DIAGNOSIS — F19.10 OTHER PSYCHOACTIVE SUBSTANCE ABUSE, UNCOMPLICATED: ICD-10-CM

## 2019-11-24 DIAGNOSIS — Z95.2 PRESENCE OF PROSTHETIC HEART VALVE: ICD-10-CM

## 2019-11-24 DIAGNOSIS — I47.1 SUPRAVENTRICULAR TACHYCARDIA: ICD-10-CM

## 2019-11-24 LAB
ALBUMIN SERPL ELPH-MCNC: 3.9 G/DL — SIGNIFICANT CHANGE UP (ref 3.3–5)
ALP SERPL-CCNC: 53 U/L — SIGNIFICANT CHANGE UP (ref 40–120)
ALT FLD-CCNC: 71 U/L — HIGH (ref 10–45)
ANION GAP SERPL CALC-SCNC: 12 MMOL/L — SIGNIFICANT CHANGE UP (ref 5–17)
APPEARANCE UR: CLEAR — SIGNIFICANT CHANGE UP
APTT BLD: 69.2 SEC — HIGH (ref 27.5–36.3)
APTT BLD: 74.7 SEC — HIGH (ref 27.5–36.3)
APTT BLD: 88.8 SEC — HIGH (ref 27.5–36.3)
AST SERPL-CCNC: 227 U/L — HIGH (ref 10–40)
BACTERIA # UR AUTO: PRESENT /HPF
BASOPHILS # BLD AUTO: 0.04 K/UL — SIGNIFICANT CHANGE UP (ref 0–0.2)
BASOPHILS NFR BLD AUTO: 0.4 % — SIGNIFICANT CHANGE UP (ref 0–2)
BILIRUB DIRECT SERPL-MCNC: <0.2 MG/DL — SIGNIFICANT CHANGE UP (ref 0–0.2)
BILIRUB INDIRECT FLD-MCNC: >0.2 MG/DL — SIGNIFICANT CHANGE UP (ref 0.2–1)
BILIRUB SERPL-MCNC: 0.4 MG/DL — SIGNIFICANT CHANGE UP (ref 0.2–1.2)
BILIRUB UR-MCNC: NEGATIVE — SIGNIFICANT CHANGE UP
BUN SERPL-MCNC: 29 MG/DL — HIGH (ref 7–23)
CALCIUM SERPL-MCNC: 9.1 MG/DL — SIGNIFICANT CHANGE UP (ref 8.4–10.5)
CHLORIDE SERPL-SCNC: 106 MMOL/L — SIGNIFICANT CHANGE UP (ref 96–108)
CK MB CFR SERPL CALC: 118.3 NG/ML — HIGH (ref 0–6.7)
CK MB CFR SERPL CALC: 188.5 NG/ML — HIGH (ref 0–6.7)
CK MB CFR SERPL CALC: 234.2 NG/ML — HIGH (ref 0–6.7)
CK MB CFR SERPL CALC: 238.1 NG/ML — HIGH (ref 0–6.7)
CK SERPL-CCNC: 573 U/L — HIGH (ref 25–170)
CK SERPL-CCNC: 765 U/L — HIGH (ref 25–170)
CK SERPL-CCNC: 872 U/L — HIGH (ref 25–170)
CK SERPL-CCNC: 922 U/L — HIGH (ref 25–170)
CO2 SERPL-SCNC: 21 MMOL/L — LOW (ref 22–31)
COLOR SPEC: YELLOW — SIGNIFICANT CHANGE UP
CREAT ?TM UR-MCNC: 61 MG/DL — SIGNIFICANT CHANGE UP
CREAT SERPL-MCNC: 0.8 MG/DL — SIGNIFICANT CHANGE UP (ref 0.5–1.3)
DIFF PNL FLD: ABNORMAL
EOSINOPHIL # BLD AUTO: 0 K/UL — SIGNIFICANT CHANGE UP (ref 0–0.5)
EOSINOPHIL NFR BLD AUTO: 0 % — SIGNIFICANT CHANGE UP (ref 0–6)
EPI CELLS # UR: SIGNIFICANT CHANGE UP /HPF (ref 0–5)
GAS PNL BLDA: SIGNIFICANT CHANGE UP
GLUCOSE BLDC GLUCOMTR-MCNC: 135 MG/DL — HIGH (ref 70–99)
GLUCOSE SERPL-MCNC: 159 MG/DL — HIGH (ref 70–99)
GLUCOSE UR QL: NEGATIVE — SIGNIFICANT CHANGE UP
HCT VFR BLD CALC: 40.1 % — SIGNIFICANT CHANGE UP (ref 34.5–45)
HGB BLD-MCNC: 13.3 G/DL — SIGNIFICANT CHANGE UP (ref 11.5–15.5)
IMM GRANULOCYTES NFR BLD AUTO: 0.3 % — SIGNIFICANT CHANGE UP (ref 0–1.5)
KETONES UR-MCNC: NEGATIVE — SIGNIFICANT CHANGE UP
LEUKOCYTE ESTERASE UR-ACNC: ABNORMAL
LYMPHOCYTES # BLD AUTO: 1.15 K/UL — SIGNIFICANT CHANGE UP (ref 1–3.3)
LYMPHOCYTES # BLD AUTO: 11.9 % — LOW (ref 13–44)
MAGNESIUM SERPL-MCNC: 1.9 MG/DL — SIGNIFICANT CHANGE UP (ref 1.6–2.6)
MCHC RBC-ENTMCNC: 33.2 GM/DL — SIGNIFICANT CHANGE UP (ref 32–36)
MCHC RBC-ENTMCNC: 33.7 PG — SIGNIFICANT CHANGE UP (ref 27–34)
MCV RBC AUTO: 101.5 FL — HIGH (ref 80–100)
MONOCYTES # BLD AUTO: 0.61 K/UL — SIGNIFICANT CHANGE UP (ref 0–0.9)
MONOCYTES NFR BLD AUTO: 6.3 % — SIGNIFICANT CHANGE UP (ref 2–14)
NEUTROPHILS # BLD AUTO: 7.86 K/UL — HIGH (ref 1.8–7.4)
NEUTROPHILS NFR BLD AUTO: 81.1 % — HIGH (ref 43–77)
NITRITE UR-MCNC: POSITIVE
NRBC # BLD: 0 /100 WBCS — SIGNIFICANT CHANGE UP (ref 0–0)
OSMOLALITY UR: 650 MOSM/KG — SIGNIFICANT CHANGE UP (ref 50–1200)
PH UR: 5.5 — SIGNIFICANT CHANGE UP (ref 5–8)
PLATELET # BLD AUTO: 183 K/UL — SIGNIFICANT CHANGE UP (ref 150–400)
POTASSIUM SERPL-MCNC: 4.3 MMOL/L — SIGNIFICANT CHANGE UP (ref 3.5–5.3)
POTASSIUM SERPL-SCNC: 4.3 MMOL/L — SIGNIFICANT CHANGE UP (ref 3.5–5.3)
PROT SERPL-MCNC: 7.1 G/DL — SIGNIFICANT CHANGE UP (ref 6–8.3)
PROT UR-MCNC: NEGATIVE MG/DL — SIGNIFICANT CHANGE UP
RBC # BLD: 3.95 M/UL — SIGNIFICANT CHANGE UP (ref 3.8–5.2)
RBC # FLD: 13.8 % — SIGNIFICANT CHANGE UP (ref 10.3–14.5)
RBC CASTS # UR COMP ASSIST: < 5 /HPF — SIGNIFICANT CHANGE UP
SODIUM SERPL-SCNC: 139 MMOL/L — SIGNIFICANT CHANGE UP (ref 135–145)
SODIUM UR-SCNC: 148 MMOL/L — SIGNIFICANT CHANGE UP
SP GR SPEC: >=1.03 — SIGNIFICANT CHANGE UP (ref 1–1.03)
T4 AB SER-ACNC: 4.39 UG/DL — SIGNIFICANT CHANGE UP (ref 3.17–11.72)
TROPONIN T SERPL-MCNC: 0.88 NG/ML — CRITICAL HIGH (ref 0–0.01)
TROPONIN T SERPL-MCNC: 1.08 NG/ML — CRITICAL HIGH (ref 0–0.01)
TROPONIN T SERPL-MCNC: 1.47 NG/ML — CRITICAL HIGH (ref 0–0.01)
TROPONIN T SERPL-MCNC: 1.52 NG/ML — CRITICAL HIGH (ref 0–0.01)
UROBILINOGEN FLD QL: 0.2 E.U./DL — SIGNIFICANT CHANGE UP
UUN UR-MCNC: 565 MG/DL — SIGNIFICANT CHANGE UP
WBC # BLD: 9.69 K/UL — SIGNIFICANT CHANGE UP (ref 3.8–10.5)
WBC # FLD AUTO: 9.69 K/UL — SIGNIFICANT CHANGE UP (ref 3.8–10.5)
WBC UR QL: ABNORMAL /HPF

## 2019-11-24 PROCEDURE — 99232 SBSQ HOSP IP/OBS MODERATE 35: CPT

## 2019-11-24 PROCEDURE — 93010 ELECTROCARDIOGRAM REPORT: CPT | Mod: 77

## 2019-11-24 PROCEDURE — 93010 ELECTROCARDIOGRAM REPORT: CPT

## 2019-11-24 PROCEDURE — 71045 X-RAY EXAM CHEST 1 VIEW: CPT | Mod: 26

## 2019-11-24 PROCEDURE — 71045 X-RAY EXAM CHEST 1 VIEW: CPT | Mod: 26,77

## 2019-11-24 RX ORDER — METOPROLOL TARTRATE 50 MG
12.5 TABLET ORAL ONCE
Refills: 0 | Status: COMPLETED | OUTPATIENT
Start: 2019-11-24 | End: 2019-11-24

## 2019-11-24 RX ORDER — METOPROLOL TARTRATE 50 MG
25 TABLET ORAL ONCE
Refills: 0 | Status: COMPLETED | OUTPATIENT
Start: 2019-11-24 | End: 2019-11-24

## 2019-11-24 RX ORDER — ASPIRIN/CALCIUM CARB/MAGNESIUM 324 MG
81 TABLET ORAL DAILY
Refills: 0 | Status: DISCONTINUED | OUTPATIENT
Start: 2019-11-25 | End: 2019-11-27

## 2019-11-24 RX ORDER — ADENOSINE 3 MG/ML
6 INJECTION INTRAVENOUS ONCE
Refills: 0 | Status: COMPLETED | OUTPATIENT
Start: 2019-11-24 | End: 2019-11-23

## 2019-11-24 RX ORDER — NITROGLYCERIN 6.5 MG
10 CAPSULE, EXTENDED RELEASE ORAL
Qty: 50 | Refills: 0 | Status: DISCONTINUED | OUTPATIENT
Start: 2019-11-24 | End: 2019-11-24

## 2019-11-24 RX ORDER — ASPIRIN/CALCIUM CARB/MAGNESIUM 324 MG
325 TABLET ORAL ONCE
Refills: 0 | Status: COMPLETED | OUTPATIENT
Start: 2019-11-24 | End: 2019-11-24

## 2019-11-24 RX ORDER — CEFTRIAXONE 500 MG/1
1000 INJECTION, POWDER, FOR SOLUTION INTRAMUSCULAR; INTRAVENOUS EVERY 24 HOURS
Refills: 0 | Status: COMPLETED | OUTPATIENT
Start: 2019-11-24 | End: 2019-11-26

## 2019-11-24 RX ORDER — TRIAMTERENE 100 MG/1
0 CAPSULE ORAL
Qty: 0 | Refills: 0 | DISCHARGE

## 2019-11-24 RX ORDER — CLOPIDOGREL BISULFATE 75 MG/1
300 TABLET, FILM COATED ORAL ONCE
Refills: 0 | Status: COMPLETED | OUTPATIENT
Start: 2019-11-24 | End: 2019-11-24

## 2019-11-24 RX ORDER — FUROSEMIDE 40 MG
20 TABLET ORAL ONCE
Refills: 0 | Status: COMPLETED | OUTPATIENT
Start: 2019-11-24 | End: 2019-11-24

## 2019-11-24 RX ORDER — AMIODARONE HYDROCHLORIDE 400 MG/1
0.5 TABLET ORAL
Qty: 900 | Refills: 0 | Status: DISCONTINUED | OUTPATIENT
Start: 2019-11-24 | End: 2019-11-25

## 2019-11-24 RX ORDER — METOPROLOL TARTRATE 50 MG
12.5 TABLET ORAL
Refills: 0 | Status: DISCONTINUED | OUTPATIENT
Start: 2019-11-24 | End: 2019-11-24

## 2019-11-24 RX ORDER — IPRATROPIUM/ALBUTEROL SULFATE 18-103MCG
3 AEROSOL WITH ADAPTER (GRAM) INHALATION EVERY 6 HOURS
Refills: 0 | Status: DISCONTINUED | OUTPATIENT
Start: 2019-11-24 | End: 2019-11-24

## 2019-11-24 RX ORDER — ACETAMINOPHEN 500 MG
1000 TABLET ORAL ONCE
Refills: 0 | Status: COMPLETED | OUTPATIENT
Start: 2019-11-24 | End: 2019-11-24

## 2019-11-24 RX ORDER — ACETAMINOPHEN 500 MG
650 TABLET ORAL EVERY 4 HOURS
Refills: 0 | Status: DISCONTINUED | OUTPATIENT
Start: 2019-11-24 | End: 2019-11-27

## 2019-11-24 RX ORDER — CEFTRIAXONE 500 MG/1
1000 INJECTION, POWDER, FOR SOLUTION INTRAMUSCULAR; INTRAVENOUS ONCE
Refills: 0 | Status: DISCONTINUED | OUTPATIENT
Start: 2019-11-24 | End: 2019-11-24

## 2019-11-24 RX ORDER — ADENOSINE 3 MG/ML
12 INJECTION INTRAVENOUS ONCE
Refills: 0 | Status: COMPLETED | OUTPATIENT
Start: 2019-11-24 | End: 2019-11-23

## 2019-11-24 RX ORDER — IPRATROPIUM BROMIDE 0.2 MG/ML
500 SOLUTION, NON-ORAL INHALATION EVERY 6 HOURS
Refills: 0 | Status: DISCONTINUED | OUTPATIENT
Start: 2019-11-24 | End: 2019-11-24

## 2019-11-24 RX ORDER — LEVOTHYROXINE SODIUM 125 MCG
0 TABLET ORAL
Qty: 0 | Refills: 0 | DISCHARGE

## 2019-11-24 RX ORDER — AMIODARONE HYDROCHLORIDE 400 MG/1
150 TABLET ORAL ONCE
Refills: 0 | Status: COMPLETED | OUTPATIENT
Start: 2019-11-24 | End: 2019-11-23

## 2019-11-24 RX ORDER — INFLUENZA VIRUS VACCINE 15; 15; 15; 15 UG/.5ML; UG/.5ML; UG/.5ML; UG/.5ML
0.5 SUSPENSION INTRAMUSCULAR ONCE
Refills: 0 | Status: COMPLETED | OUTPATIENT
Start: 2019-11-24 | End: 2019-11-24

## 2019-11-24 RX ORDER — ATORVASTATIN CALCIUM 80 MG/1
80 TABLET, FILM COATED ORAL AT BEDTIME
Refills: 0 | Status: DISCONTINUED | OUTPATIENT
Start: 2019-11-24 | End: 2019-11-27

## 2019-11-24 RX ORDER — IPRATROPIUM BROMIDE 0.2 MG/ML
1 SOLUTION, NON-ORAL INHALATION EVERY 6 HOURS
Refills: 0 | Status: DISCONTINUED | OUTPATIENT
Start: 2019-11-24 | End: 2019-11-24

## 2019-11-24 RX ORDER — METOPROLOL TARTRATE 50 MG
5 TABLET ORAL ONCE
Refills: 0 | Status: COMPLETED | OUTPATIENT
Start: 2019-11-24 | End: 2019-11-24

## 2019-11-24 RX ORDER — AMIODARONE HYDROCHLORIDE 400 MG/1
400 TABLET ORAL ONCE
Refills: 0 | Status: DISCONTINUED | OUTPATIENT
Start: 2019-11-25 | End: 2019-11-25

## 2019-11-24 RX ORDER — CLOPIDOGREL BISULFATE 75 MG/1
75 TABLET, FILM COATED ORAL DAILY
Refills: 0 | Status: DISCONTINUED | OUTPATIENT
Start: 2019-11-25 | End: 2019-11-25

## 2019-11-24 RX ORDER — HEPARIN SODIUM 5000 [USP'U]/ML
700 INJECTION INTRAVENOUS; SUBCUTANEOUS
Qty: 25000 | Refills: 0 | Status: DISCONTINUED | OUTPATIENT
Start: 2019-11-24 | End: 2019-11-25

## 2019-11-24 RX ORDER — ACETAMINOPHEN 500 MG
650 TABLET ORAL ONCE
Refills: 0 | Status: COMPLETED | OUTPATIENT
Start: 2019-11-25 | End: 2019-11-25

## 2019-11-24 RX ORDER — METOPROLOL TARTRATE 50 MG
25 TABLET ORAL
Refills: 0 | Status: DISCONTINUED | OUTPATIENT
Start: 2019-11-24 | End: 2019-11-25

## 2019-11-24 RX ORDER — AMIODARONE HYDROCHLORIDE 400 MG/1
400 TABLET ORAL EVERY 12 HOURS
Refills: 0 | Status: DISCONTINUED | OUTPATIENT
Start: 2019-11-25 | End: 2019-11-25

## 2019-11-24 RX ADMIN — Medication 25 MILLIGRAM(S): at 08:02

## 2019-11-24 RX ADMIN — ATORVASTATIN CALCIUM 80 MILLIGRAM(S): 80 TABLET, FILM COATED ORAL at 21:57

## 2019-11-24 RX ADMIN — Medication 3 MICROGRAM(S)/MIN: at 09:08

## 2019-11-24 RX ADMIN — Medication 20 MILLIGRAM(S): at 08:01

## 2019-11-24 RX ADMIN — Medication 500 MICROGRAM(S): at 06:30

## 2019-11-24 RX ADMIN — Medication 12.5 MILLIGRAM(S): at 15:25

## 2019-11-24 RX ADMIN — Medication 25 MILLIGRAM(S): at 19:12

## 2019-11-24 RX ADMIN — CLOPIDOGREL BISULFATE 300 MILLIGRAM(S): 75 TABLET, FILM COATED ORAL at 07:12

## 2019-11-24 RX ADMIN — Medication 5 MILLIGRAM(S): at 15:25

## 2019-11-24 RX ADMIN — AMIODARONE HYDROCHLORIDE 33.33 MG/MIN: 400 TABLET ORAL at 00:26

## 2019-11-24 RX ADMIN — Medication 20 MILLIGRAM(S): at 08:41

## 2019-11-24 RX ADMIN — CEFTRIAXONE 1000 MILLIGRAM(S): 500 INJECTION, POWDER, FOR SOLUTION INTRAMUSCULAR; INTRAVENOUS at 21:07

## 2019-11-24 RX ADMIN — Medication 0.5 MILLIGRAM(S): at 20:43

## 2019-11-24 RX ADMIN — Medication 1000 MILLIGRAM(S): at 21:42

## 2019-11-24 RX ADMIN — Medication 12.5 MILLIGRAM(S): at 05:06

## 2019-11-24 RX ADMIN — Medication 3 MILLILITER(S): at 08:05

## 2019-11-24 RX ADMIN — AMIODARONE HYDROCHLORIDE 16.67 MG/MIN: 400 TABLET ORAL at 09:12

## 2019-11-24 RX ADMIN — Medication 325 MILLIGRAM(S): at 07:12

## 2019-11-24 RX ADMIN — Medication 400 MILLIGRAM(S): at 20:38

## 2019-11-24 NOTE — H&P ADULT - ASSESSMENT
Patient is an 86 y/o female w hx of aortic valve replacement in 2009, intestinal resection s/p ovarian cyst rupture presenting for evaluation of rapid heart rate. Presented in SVT to 200s, converted to AF RVR and then NSR on amiodarone drip.

## 2019-11-24 NOTE — H&P ADULT - PROBLEM SELECTOR PLAN 7
F: Dehydrated by labs, encourage oral fluid intake  E: Replete K>4, Mg>2  N: DASH/TLC  DVTP: On heparin drip

## 2019-11-24 NOTE — H&P ADULT - PROBLEM SELECTOR PLAN 2
Patient briefly converted to AF RVR while undergoing treatment for SVT.   - CHADSVASC of 3, on heparin drip at present.   - Rate improving on amio drip, AF has resolved and patient is now in NSR. If goes back into RVR will consider beta blockade.

## 2019-11-24 NOTE — H&P ADULT - PROBLEM SELECTOR PLAN 8
1.       PCP Contacted on Admission: (Y/N) --> Name & Phone #:  2.       Date of Contact with PCP:  3.       PCP Contacted at Discharge: (Y/N)  4.       Summary of Handoff Given to PCP:  5.       Post-Discharge Appointment Date and Location: [facility]

## 2019-11-24 NOTE — H&P ADULT - NSHPPHYSICALEXAM_GEN_ALL_CORE
VITAL SIGNS:  T(C): 36.4 (11-24-19 @ 00:44), Max: 36.7 (11-24-19 @ 00:00)  T(F): 97.5 (11-24-19 @ 00:44), Max: 98 (11-24-19 @ 00:00)  HR: 100 (11-24-19 @ 01:23) (94 - 200)  BP: 137/67 (11-24-19 @ 01:23) (94/54 - 162/74)  BP(mean): 98 (11-24-19 @ 01:23) (96 - 98)  RR: 26 (11-24-19 @ 01:23) (17 - 26)  SpO2: 94% (11-24-19 @ 01:23) (94% - 97%)  Wt(kg): --    PHYSICAL EXAM:  Constitutional: WDWN resting comfortably in bed; NAD  Head: NC/AT  Eyes: PERRL, EOMI, anicteric sclera  ENT: no nasal discharge; uvula midline, no oropharyngeal erythema or exudates; MMM  Neck: supple; no JVD or thyromegaly  Respiratory: CTA B/L; no W/R/R, no retractions  Cardiac: Tachycardic, regular. +S1/S2; RRR; no M/R/G; PMI non-displaced  Gastrointestinal: soft, NT/ND; no rebound or guarding; +BSx4  Genitourinary: normal external genitalia  Back: spine midline, no bony tenderness or step-offs; no CVAT B/L  Extremities: WWP, no clubbing or cyanosis; no peripheral edema  Musculoskeletal: NROM x4; no joint swelling, tenderness or erythema  Vascular: 2+ radial, femoral, DP/PT pulses B/L  Dermatologic: skin warm, dry and intact; no rashes, wounds, or scars  Lymphatic: no submandibular or cervical LAD  Neurologic: AAOx3; CNII-XII grossly intact; no focal deficits  Psychiatric: affect and characteristics of appearance, verbalizations, behaviors are appropriate

## 2019-11-24 NOTE — H&P ADULT - PROBLEM SELECTOR PLAN 4
Patient has been taking HCTZ-triamterene 37.5/25 as well as levothyroxine 50mcg and liothyronine 5mcg to lose weight. She denies any other medical indication for these medications. Discussed with patient the risks these medications pose including electrolyte abnormalities, arrhythmias, and possible injury/death. She expressed understanding and is agreeable to d/c of medications for now but would like to discuss with her endocrinologist.  - Likely contributed to current arrhythmia, will discontinue these medications on discharge, discuss with patient's primary and endocrinologist.

## 2019-11-24 NOTE — H&P ADULT - PROBLEM SELECTOR PLAN 5
Cr of 1.32 on baseline of 0.7.   - Ordered urine studies to assess FeNa/FeUrea (on diuretics)  - May be 2/2 dehydration from diuretic abuse

## 2019-11-24 NOTE — H&P ADULT - NSHPLABSRESULTS_GEN_ALL_CORE
LABS:                        13.1   10.11 )-----------( 173      ( 23 Nov 2019 21:41 )             39.1     11-23    148<H>  |  110<H>  |  35<H>  ----------------------------<  173<H>  4.2   |  23  |  1.32<H>    Ca    9.5      23 Nov 2019 21:41  Mg     1.9     11-23    TPro  7.2  /  Alb  3.3<L>  /  TBili  0.3  /  DBili  x   /  AST  94<H>  /  ALT  58<H>  /  AlkPhos  54  11-23    PT/INR - ( 23 Nov 2019 21:41 )   PT: 10.4 sec;   INR: 0.95          PTT - ( 23 Nov 2019 21:41 )  PTT:28.9 sec    CAPILLARY BLOOD GLUCOSE          RADIOLOGY & ADDITIONAL TESTS: Reviewed.

## 2019-11-24 NOTE — H&P ADULT - NSICDXPASTMEDICALHX_GEN_ALL_CORE_FT
PAST MEDICAL HISTORY:  Aortic stenosis     Aortic valve replaced     Essential hypertension     Hypothyroid     Osteoporosis     Tibial fracture

## 2019-11-24 NOTE — H&P ADULT - NSICDXPASTSURGICALHX_GEN_ALL_CORE_FT
PAST SURGICAL HISTORY:  H/O aortic valve replacement     S/P arthroscopic knee surgery     S/P cataract surgery     S/P ORIF (open reduction internal fixation) fracture

## 2019-11-24 NOTE — H&P ADULT - PROBLEM SELECTOR PLAN 3
Patient with elevated troponin I at [facility] while having chest pain, no ischemic changes seen on EKG. Likely in setting of arrhythmia, patient is now chest pain free with resolution of arrhythmia.   - Repeat troponin, ensure downtrending. Repeated EKG on arrival with no ischemic changes. Patient now chest pain free. Repeat EKG in AM. Patient with elevated troponin I at [facility] while having chest pain, no ischemic changes seen on EKG. Likely in setting of arrhythmia, patient is now chest pain free with resolution of arrhythmia.   - Repeat troponin T, ensure downtrending. Repeated EKG on arrival with no ischemic changes.   - Bedside echo in AM to assess for regional wall motion abnormalities.

## 2019-11-24 NOTE — H&P ADULT - NSHPREVIEWOFSYSTEMS_GEN_ALL_CORE
REVIEW OF SYSTEMS:  CONSTITUTIONAL: No weakness, fevers or chills.   EYES/ENT: No visual changes;  No vertigo or throat pain   NECK: No pain or stiffness  RESPIRATORY: No cough, wheezing, hemoptysis; No shortness of breath  CARDIOVASCULAR: No chest pain or palpitations  GASTROINTESTINAL: No abdominal or epigastric pain. No nausea, vomiting, or hematemesis; No diarrhea or constipation. No melena or hematochezia.  GENITOURINARY: No dysuria, frequency or hematuria  NEUROLOGICAL: No numbness or weakness  SKIN: No itching, burning, rashes, or lesions   All other review of systems is negative unless indicated above.

## 2019-11-24 NOTE — CHART NOTE - NSCHARTNOTEFT_GEN_A_CORE
PGY3 Chart Note    Called to bedside for respiratory distress. Patient noted to be tachypneic, with accessory muscle use and bilateral rales on exam. Vitals: Temp 98.9F rectal,  (A-fib), /70s, RR 30s, SpO2 70s% on 2L NC. Urgent CXR with worsened pulmonary edema. ABG with mild hypoxia. Patient placed on BiPAP, with improvement in oxygen to 94% on BiPAP 12/5/60, and given additional 20mg IV Lasix (total 40mg IV). Patient also started on nitro gtt at 10mcg/min. Indwelling Arana catheter placed for strict I/o monitoring in acute CHF patient. Pending bedside ECHO by cardiology fellow.

## 2019-11-24 NOTE — H&P ADULT - HISTORY OF PRESENT ILLNESS
Patient is an 86 y/o female w hx of aortic valve replacement in 2009, intestinal resection s/p ovarian cyst rupture presenting for evaluation of rapid heart rate. Patient states that she was coming back from a party after having two glasses of champagne when she began experiencing discomfort in her chest, abdomen, and both arms with associated Patient is an 88 y/o female w hx of aortic valve replacement in 2009, intestinal resection s/p ovarian cyst rupture presenting for evaluation of rapid heart rate. Patient states that she was coming back from a party after having two glasses of champagne when she began experiencing discomfort in her chest, abdomen, and both arms with associated lightheadedness. She presented to the ED where she was noted to have HR of 200 for which she received adenosine 6mg then 12mg without improvement. She was placed on amiodarone drip and converted to AF -130 bpm. Patient's BP was stable and her symptoms improved after pharmacologic cardioversion. She is currently chest pain free, denies shortness of breath. She denies any recent anginal symptoms and has good functional status, frequently walking several blocks.   Of note, patient states she uses triamterene and levothyroxine for weight loss, denies any other medical indication for these medications. She is unsure of the doses.

## 2019-11-24 NOTE — H&P ADULT - PROBLEM SELECTOR PLAN 1
Patient presented with narrow complex tachycardia of 200bpm at [facility], no response to adenosine, responded to amiodarone drip, briefly in AF RVR as below, now in NSR on most recent EKG and telemetry on arrival.  - Continue telemetry  - Echocardiogram in AM to assess for structural heart disease  - Continue amiodarone drip which converted patient in [facility].   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

## 2019-11-25 DIAGNOSIS — E11.9 TYPE 2 DIABETES MELLITUS WITHOUT COMPLICATIONS: ICD-10-CM

## 2019-11-25 LAB
ALBUMIN SERPL ELPH-MCNC: 3.5 G/DL — SIGNIFICANT CHANGE UP (ref 3.3–5)
ALBUMIN SERPL ELPH-MCNC: 3.5 G/DL — SIGNIFICANT CHANGE UP (ref 3.3–5)
ALP SERPL-CCNC: 51 U/L — SIGNIFICANT CHANGE UP (ref 40–120)
ALP SERPL-CCNC: 57 U/L — SIGNIFICANT CHANGE UP (ref 40–120)
ALT FLD-CCNC: 58 U/L — HIGH (ref 10–45)
ALT FLD-CCNC: 71 U/L — HIGH (ref 10–45)
ANION GAP SERPL CALC-SCNC: 10 MMOL/L — SIGNIFICANT CHANGE UP (ref 5–17)
ANION GAP SERPL CALC-SCNC: 13 MMOL/L — SIGNIFICANT CHANGE UP (ref 5–17)
APTT BLD: 37.2 SEC — HIGH (ref 27.5–36.3)
APTT BLD: 64.2 SEC — HIGH (ref 27.5–36.3)
APTT BLD: >200 SEC — CRITICAL HIGH (ref 27.5–36.3)
AST SERPL-CCNC: 140 U/L — HIGH (ref 10–40)
AST SERPL-CCNC: 183 U/L — HIGH (ref 10–40)
BASOPHILS # BLD AUTO: 0.04 K/UL — SIGNIFICANT CHANGE UP (ref 0–0.2)
BASOPHILS # BLD AUTO: 0.08 K/UL — SIGNIFICANT CHANGE UP (ref 0–0.2)
BASOPHILS NFR BLD AUTO: 0.4 % — SIGNIFICANT CHANGE UP (ref 0–2)
BASOPHILS NFR BLD AUTO: 0.7 % — SIGNIFICANT CHANGE UP (ref 0–2)
BILIRUB SERPL-MCNC: 0.6 MG/DL — SIGNIFICANT CHANGE UP (ref 0.2–1.2)
BILIRUB SERPL-MCNC: 0.7 MG/DL — SIGNIFICANT CHANGE UP (ref 0.2–1.2)
BLD GP AB SCN SERPL QL: NEGATIVE — SIGNIFICANT CHANGE UP
BUN SERPL-MCNC: 24 MG/DL — HIGH (ref 7–23)
BUN SERPL-MCNC: 27 MG/DL — HIGH (ref 7–23)
CALCIUM SERPL-MCNC: 8.4 MG/DL — SIGNIFICANT CHANGE UP (ref 8.4–10.5)
CALCIUM SERPL-MCNC: 8.7 MG/DL — SIGNIFICANT CHANGE UP (ref 8.4–10.5)
CHLORIDE SERPL-SCNC: 100 MMOL/L — SIGNIFICANT CHANGE UP (ref 96–108)
CHLORIDE SERPL-SCNC: 98 MMOL/L — SIGNIFICANT CHANGE UP (ref 96–108)
CK MB CFR SERPL CALC: 69.7 NG/ML — HIGH (ref 0–6.7)
CK SERPL-CCNC: 383 U/L — HIGH (ref 25–170)
CO2 SERPL-SCNC: 25 MMOL/L — SIGNIFICANT CHANGE UP (ref 22–31)
CO2 SERPL-SCNC: 27 MMOL/L — SIGNIFICANT CHANGE UP (ref 22–31)
CREAT SERPL-MCNC: 0.79 MG/DL — SIGNIFICANT CHANGE UP (ref 0.5–1.3)
CREAT SERPL-MCNC: 0.91 MG/DL — SIGNIFICANT CHANGE UP (ref 0.5–1.3)
CULTURE RESULTS: SIGNIFICANT CHANGE UP
EOSINOPHIL # BLD AUTO: 0.05 K/UL — SIGNIFICANT CHANGE UP (ref 0–0.5)
EOSINOPHIL # BLD AUTO: 0.07 K/UL — SIGNIFICANT CHANGE UP (ref 0–0.5)
EOSINOPHIL NFR BLD AUTO: 0.4 % — SIGNIFICANT CHANGE UP (ref 0–6)
EOSINOPHIL NFR BLD AUTO: 0.7 % — SIGNIFICANT CHANGE UP (ref 0–6)
GLUCOSE BLDC GLUCOMTR-MCNC: 113 MG/DL — HIGH (ref 70–99)
GLUCOSE BLDC GLUCOMTR-MCNC: 125 MG/DL — HIGH (ref 70–99)
GLUCOSE SERPL-MCNC: 147 MG/DL — HIGH (ref 70–99)
GLUCOSE SERPL-MCNC: 156 MG/DL — HIGH (ref 70–99)
HCT VFR BLD CALC: 42.4 % — SIGNIFICANT CHANGE UP (ref 34.5–45)
HCT VFR BLD CALC: 42.5 % — SIGNIFICANT CHANGE UP (ref 34.5–45)
HGB BLD-MCNC: 13.6 G/DL — SIGNIFICANT CHANGE UP (ref 11.5–15.5)
HGB BLD-MCNC: 13.9 G/DL — SIGNIFICANT CHANGE UP (ref 11.5–15.5)
IMM GRANULOCYTES NFR BLD AUTO: 0.2 % — SIGNIFICANT CHANGE UP (ref 0–1.5)
IMM GRANULOCYTES NFR BLD AUTO: 0.4 % — SIGNIFICANT CHANGE UP (ref 0–1.5)
INR BLD: 1.02 — SIGNIFICANT CHANGE UP (ref 0.88–1.16)
LACTATE SERPL-SCNC: 1.9 MMOL/L — SIGNIFICANT CHANGE UP (ref 0.5–2)
LACTATE SERPL-SCNC: 2.6 MMOL/L — HIGH (ref 0.5–2)
LACTATE SERPL-SCNC: 3.5 MMOL/L — HIGH (ref 0.5–2)
LYMPHOCYTES # BLD AUTO: 1.32 K/UL — SIGNIFICANT CHANGE UP (ref 1–3.3)
LYMPHOCYTES # BLD AUTO: 1.9 K/UL — SIGNIFICANT CHANGE UP (ref 1–3.3)
LYMPHOCYTES # BLD AUTO: 11 % — LOW (ref 13–44)
LYMPHOCYTES # BLD AUTO: 19.5 % — SIGNIFICANT CHANGE UP (ref 13–44)
MAGNESIUM SERPL-MCNC: 1.8 MG/DL — SIGNIFICANT CHANGE UP (ref 1.6–2.6)
MCHC RBC-ENTMCNC: 32.1 GM/DL — SIGNIFICANT CHANGE UP (ref 32–36)
MCHC RBC-ENTMCNC: 32.7 GM/DL — SIGNIFICANT CHANGE UP (ref 32–36)
MCHC RBC-ENTMCNC: 32.8 PG — SIGNIFICANT CHANGE UP (ref 27–34)
MCHC RBC-ENTMCNC: 32.8 PG — SIGNIFICANT CHANGE UP (ref 27–34)
MCV RBC AUTO: 100.2 FL — HIGH (ref 80–100)
MCV RBC AUTO: 102.2 FL — HIGH (ref 80–100)
MONOCYTES # BLD AUTO: 0.71 K/UL — SIGNIFICANT CHANGE UP (ref 0–0.9)
MONOCYTES # BLD AUTO: 0.87 K/UL — SIGNIFICANT CHANGE UP (ref 0–0.9)
MONOCYTES NFR BLD AUTO: 7.3 % — SIGNIFICANT CHANGE UP (ref 2–14)
MONOCYTES NFR BLD AUTO: 7.3 % — SIGNIFICANT CHANGE UP (ref 2–14)
NEUTROPHILS # BLD AUTO: 6.98 K/UL — SIGNIFICANT CHANGE UP (ref 1.8–7.4)
NEUTROPHILS # BLD AUTO: 9.61 K/UL — HIGH (ref 1.8–7.4)
NEUTROPHILS NFR BLD AUTO: 71.9 % — SIGNIFICANT CHANGE UP (ref 43–77)
NEUTROPHILS NFR BLD AUTO: 80.2 % — HIGH (ref 43–77)
NRBC # BLD: 0 /100 WBCS — SIGNIFICANT CHANGE UP (ref 0–0)
NRBC # BLD: 0 /100 WBCS — SIGNIFICANT CHANGE UP (ref 0–0)
PHOSPHATE SERPL-MCNC: 3.4 MG/DL — SIGNIFICANT CHANGE UP (ref 2.5–4.5)
PLATELET # BLD AUTO: 182 K/UL — SIGNIFICANT CHANGE UP (ref 150–400)
PLATELET # BLD AUTO: 197 K/UL — SIGNIFICANT CHANGE UP (ref 150–400)
POTASSIUM SERPL-MCNC: 3.5 MMOL/L — SIGNIFICANT CHANGE UP (ref 3.5–5.3)
POTASSIUM SERPL-MCNC: 3.6 MMOL/L — SIGNIFICANT CHANGE UP (ref 3.5–5.3)
POTASSIUM SERPL-SCNC: 3.5 MMOL/L — SIGNIFICANT CHANGE UP (ref 3.5–5.3)
POTASSIUM SERPL-SCNC: 3.6 MMOL/L — SIGNIFICANT CHANGE UP (ref 3.5–5.3)
PROT SERPL-MCNC: 6.5 G/DL — SIGNIFICANT CHANGE UP (ref 6–8.3)
PROT SERPL-MCNC: 6.5 G/DL — SIGNIFICANT CHANGE UP (ref 6–8.3)
PROTHROM AB SERPL-ACNC: 11.5 SEC — SIGNIFICANT CHANGE UP (ref 10–12.9)
RBC # BLD: 4.15 M/UL — SIGNIFICANT CHANGE UP (ref 3.8–5.2)
RBC # BLD: 4.24 M/UL — SIGNIFICANT CHANGE UP (ref 3.8–5.2)
RBC # FLD: 14 % — SIGNIFICANT CHANGE UP (ref 10.3–14.5)
RBC # FLD: 14.1 % — SIGNIFICANT CHANGE UP (ref 10.3–14.5)
RH IG SCN BLD-IMP: POSITIVE — SIGNIFICANT CHANGE UP
SODIUM SERPL-SCNC: 135 MMOL/L — SIGNIFICANT CHANGE UP (ref 135–145)
SODIUM SERPL-SCNC: 138 MMOL/L — SIGNIFICANT CHANGE UP (ref 135–145)
SPECIMEN SOURCE: SIGNIFICANT CHANGE UP
THROMBIN TIME: >300 SEC — HIGH (ref 17.6–24)
TROPONIN T SERPL-MCNC: 1.29 NG/ML — CRITICAL HIGH (ref 0–0.01)
WBC # BLD: 11.98 K/UL — HIGH (ref 3.8–10.5)
WBC # BLD: 9.72 K/UL — SIGNIFICANT CHANGE UP (ref 3.8–10.5)
WBC # FLD AUTO: 11.98 K/UL — HIGH (ref 3.8–10.5)
WBC # FLD AUTO: 9.72 K/UL — SIGNIFICANT CHANGE UP (ref 3.8–10.5)

## 2019-11-25 PROCEDURE — 99232 SBSQ HOSP IP/OBS MODERATE 35: CPT

## 2019-11-25 PROCEDURE — 93306 TTE W/DOPPLER COMPLETE: CPT | Mod: 26

## 2019-11-25 PROCEDURE — 93454 CORONARY ARTERY ANGIO S&I: CPT | Mod: 26

## 2019-11-25 RX ORDER — ADENOSINE 3 MG/ML
12 INJECTION INTRAVENOUS ONCE
Refills: 0 | Status: COMPLETED | OUTPATIENT
Start: 2019-11-25 | End: 2019-11-25

## 2019-11-25 RX ORDER — LEVOTHYROXINE SODIUM 125 MCG
0 TABLET ORAL
Qty: 0 | Refills: 0 | DISCHARGE

## 2019-11-25 RX ORDER — METOPROLOL TARTRATE 50 MG
50 TABLET ORAL EVERY 8 HOURS
Refills: 0 | Status: DISCONTINUED | OUTPATIENT
Start: 2019-11-25 | End: 2019-11-27

## 2019-11-25 RX ORDER — DEXTROSE 50 % IN WATER 50 %
25 SYRINGE (ML) INTRAVENOUS ONCE
Refills: 0 | Status: DISCONTINUED | OUTPATIENT
Start: 2019-11-25 | End: 2019-11-27

## 2019-11-25 RX ORDER — DEXTROSE 50 % IN WATER 50 %
15 SYRINGE (ML) INTRAVENOUS ONCE
Refills: 0 | Status: DISCONTINUED | OUTPATIENT
Start: 2019-11-25 | End: 2019-11-27

## 2019-11-25 RX ORDER — TRIAMTERENE 100 MG/1
0 CAPSULE ORAL
Qty: 0 | Refills: 0 | DISCHARGE

## 2019-11-25 RX ORDER — ADENOSINE 3 MG/ML
6 INJECTION INTRAVENOUS ONCE
Refills: 0 | Status: COMPLETED | OUTPATIENT
Start: 2019-11-25 | End: 2019-11-25

## 2019-11-25 RX ORDER — AMIODARONE HYDROCHLORIDE 400 MG/1
150 TABLET ORAL ONCE
Refills: 0 | Status: COMPLETED | OUTPATIENT
Start: 2019-11-25 | End: 2019-11-25

## 2019-11-25 RX ORDER — METOPROLOL TARTRATE 50 MG
25 TABLET ORAL EVERY 6 HOURS
Refills: 0 | Status: DISCONTINUED | OUTPATIENT
Start: 2019-11-25 | End: 2019-11-25

## 2019-11-25 RX ORDER — SODIUM CHLORIDE 9 MG/ML
1000 INJECTION, SOLUTION INTRAVENOUS
Refills: 0 | Status: DISCONTINUED | OUTPATIENT
Start: 2019-11-25 | End: 2019-11-27

## 2019-11-25 RX ORDER — HEPARIN SODIUM 5000 [USP'U]/ML
700 INJECTION INTRAVENOUS; SUBCUTANEOUS
Qty: 25000 | Refills: 0 | Status: DISCONTINUED | OUTPATIENT
Start: 2019-11-25 | End: 2019-11-25

## 2019-11-25 RX ORDER — MAGNESIUM SULFATE 500 MG/ML
2 VIAL (ML) INJECTION ONCE
Refills: 0 | Status: COMPLETED | OUTPATIENT
Start: 2019-11-25 | End: 2019-11-25

## 2019-11-25 RX ORDER — METOPROLOL TARTRATE 50 MG
50 TABLET ORAL
Refills: 0 | Status: DISCONTINUED | OUTPATIENT
Start: 2019-11-25 | End: 2019-11-25

## 2019-11-25 RX ORDER — METFORMIN HYDROCHLORIDE 850 MG/1
1 TABLET ORAL
Qty: 0 | Refills: 0 | DISCHARGE

## 2019-11-25 RX ORDER — DIGOXIN 250 MCG
0.5 TABLET ORAL ONCE
Refills: 0 | Status: COMPLETED | OUTPATIENT
Start: 2019-11-25 | End: 2019-11-25

## 2019-11-25 RX ORDER — INSULIN LISPRO 100/ML
VIAL (ML) SUBCUTANEOUS
Refills: 0 | Status: DISCONTINUED | OUTPATIENT
Start: 2019-11-25 | End: 2019-11-27

## 2019-11-25 RX ORDER — DEXTROSE 50 % IN WATER 50 %
12.5 SYRINGE (ML) INTRAVENOUS ONCE
Refills: 0 | Status: DISCONTINUED | OUTPATIENT
Start: 2019-11-25 | End: 2019-11-27

## 2019-11-25 RX ORDER — DONEPEZIL HYDROCHLORIDE 10 MG/1
5 TABLET, FILM COATED ORAL AT BEDTIME
Refills: 0 | Status: DISCONTINUED | OUTPATIENT
Start: 2019-11-25 | End: 2019-11-27

## 2019-11-25 RX ORDER — GLUCAGON INJECTION, SOLUTION 0.5 MG/.1ML
1 INJECTION, SOLUTION SUBCUTANEOUS ONCE
Refills: 0 | Status: DISCONTINUED | OUTPATIENT
Start: 2019-11-25 | End: 2019-11-27

## 2019-11-25 RX ORDER — CLOPIDOGREL BISULFATE 75 MG/1
225 TABLET, FILM COATED ORAL ONCE
Refills: 0 | Status: COMPLETED | OUTPATIENT
Start: 2019-11-25 | End: 2019-11-25

## 2019-11-25 RX ORDER — SODIUM CHLORIDE 9 MG/ML
250 INJECTION INTRAMUSCULAR; INTRAVENOUS; SUBCUTANEOUS ONCE
Refills: 0 | Status: COMPLETED | OUTPATIENT
Start: 2019-11-25 | End: 2019-11-25

## 2019-11-25 RX ORDER — AMIODARONE HYDROCHLORIDE 400 MG/1
1 TABLET ORAL
Qty: 900 | Refills: 0 | Status: DISCONTINUED | OUTPATIENT
Start: 2019-11-25 | End: 2019-11-26

## 2019-11-25 RX ORDER — PHENAZOPYRIDINE HCL 100 MG
100 TABLET ORAL EVERY 8 HOURS
Refills: 0 | Status: DISCONTINUED | OUTPATIENT
Start: 2019-11-25 | End: 2019-11-25

## 2019-11-25 RX ORDER — ONDANSETRON 8 MG/1
4 TABLET, FILM COATED ORAL ONCE
Refills: 0 | Status: COMPLETED | OUTPATIENT
Start: 2019-11-25 | End: 2019-11-25

## 2019-11-25 RX ORDER — PHENAZOPYRIDINE HCL 100 MG
100 TABLET ORAL EVERY 8 HOURS
Refills: 0 | Status: DISCONTINUED | OUTPATIENT
Start: 2019-11-25 | End: 2019-11-27

## 2019-11-25 RX ORDER — POTASSIUM CHLORIDE 20 MEQ
40 PACKET (EA) ORAL ONCE
Refills: 0 | Status: COMPLETED | OUTPATIENT
Start: 2019-11-25 | End: 2019-11-25

## 2019-11-25 RX ORDER — ACETAMINOPHEN 500 MG
1000 TABLET ORAL ONCE
Refills: 0 | Status: COMPLETED | OUTPATIENT
Start: 2019-11-25 | End: 2019-11-25

## 2019-11-25 RX ADMIN — HEPARIN SODIUM 7 UNIT(S)/HR: 5000 INJECTION INTRAVENOUS; SUBCUTANEOUS at 14:15

## 2019-11-25 RX ADMIN — ONDANSETRON 4 MILLIGRAM(S): 8 TABLET, FILM COATED ORAL at 23:47

## 2019-11-25 RX ADMIN — Medication 40 MILLIEQUIVALENT(S): at 09:07

## 2019-11-25 RX ADMIN — AMIODARONE HYDROCHLORIDE 33.33 MG/MIN: 400 TABLET ORAL at 14:31

## 2019-11-25 RX ADMIN — ATORVASTATIN CALCIUM 80 MILLIGRAM(S): 80 TABLET, FILM COATED ORAL at 22:09

## 2019-11-25 RX ADMIN — Medication 25 MILLIGRAM(S): at 01:07

## 2019-11-25 RX ADMIN — AMIODARONE HYDROCHLORIDE 150 MILLIGRAM(S): 400 TABLET ORAL at 00:00

## 2019-11-25 RX ADMIN — CLOPIDOGREL BISULFATE 75 MILLIGRAM(S): 75 TABLET, FILM COATED ORAL at 11:12

## 2019-11-25 RX ADMIN — CLOPIDOGREL BISULFATE 225 MILLIGRAM(S): 75 TABLET, FILM COATED ORAL at 18:54

## 2019-11-25 RX ADMIN — ADENOSINE 6 MILLIGRAM(S): 3 INJECTION INTRAVENOUS at 00:00

## 2019-11-25 RX ADMIN — Medication 400 MILLIGRAM(S): at 00:10

## 2019-11-25 RX ADMIN — Medication 0.5 MILLIGRAM(S): at 11:12

## 2019-11-25 RX ADMIN — DONEPEZIL HYDROCHLORIDE 5 MILLIGRAM(S): 10 TABLET, FILM COATED ORAL at 22:09

## 2019-11-25 RX ADMIN — Medication 1000 MILLIGRAM(S): at 00:50

## 2019-11-25 RX ADMIN — Medication 50 MILLIGRAM(S): at 20:35

## 2019-11-25 RX ADMIN — Medication 50 MILLIGRAM(S): at 11:11

## 2019-11-25 RX ADMIN — Medication 81 MILLIGRAM(S): at 11:10

## 2019-11-25 RX ADMIN — ADENOSINE 12 MILLIGRAM(S): 3 INJECTION INTRAVENOUS at 00:00

## 2019-11-25 RX ADMIN — Medication 25 MILLIGRAM(S): at 06:35

## 2019-11-25 RX ADMIN — SODIUM CHLORIDE 1000 MILLILITER(S): 9 INJECTION INTRAMUSCULAR; INTRAVENOUS; SUBCUTANEOUS at 00:05

## 2019-11-25 RX ADMIN — Medication 50 GRAM(S): at 06:35

## 2019-11-25 RX ADMIN — CEFTRIAXONE 1000 MILLIGRAM(S): 500 INJECTION, POWDER, FOR SOLUTION INTRAMUSCULAR; INTRAVENOUS at 18:14

## 2019-11-25 NOTE — CHART NOTE - NSCHARTNOTEFT_GEN_A_CORE
Called to bedside by RN due to  bpm at [facility].     In SVT on monitor when MD entered room. Patient in acute distress, responsive and verbalizing "I feel terrible, I can't breathe."     Attempted vagal maneuvers while RN preparing adenosine. Defibrillator brought to bedside; placed pads and leads. Rapid response called; cardiac fellow and rapid response teams arrived at bedside. Gave adenosine 6 mg. Gave adenosine 12 mg. Gave amio 150 mg IV push and rate improved to 177 but remained fixed at that rate, then returned to 200. Resumed amio drip at 1 mg/hr and gave amio push 150 again, at which time rate broke to 130s and patient in a fib with RVR on monitor. Rate then improved further to 102 bpm. SBP, which had been in 80s at maximal rate, improved to 100s, MAP 75. Gave  cc bolus over 15 min. Rectal temp 100.2; given offset of acetaminophen IV given at 8 PM impending, redosed acetaminophen IV x1.     Patient alert, responsive, no longer in acute distress, but fatigued. Lactate 3.5, PTT therapeutic, INR stable; unable to obtain other labs due to peripheral vasoconstriction in spite of multiple attempts (appreciate RN assistance). Patient with Arana in place, making about 25 cc in the hour after rapid response.    Plan: continue amiodarone drip 1 mg/hr. Start metoprolol tartrate 25 mg q6h with hold parameters for rate control. Monitor strict intake and output; maintain Arana in place for strict UOP monitoring in critically ill. Will repeat lactate in 1 hour, attempt to get cardiac enzymes and additional labs at that time. Frequent lung checks after fluids and IV meds.     Plan discussed with cardiac fellow.

## 2019-11-25 NOTE — PROGRESS NOTE ADULT - PROBLEM SELECTOR PROBLEM 4
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

## 2019-11-25 NOTE — PROGRESS NOTE ADULT - PROBLEM SELECTOR PLAN 3
Patient with elevated troponin I at [facility] while having chest pain, no ischemic changes seen on EKG. Likely in setting of arrhythmia, patient is now chest pain free with resolution of arrhythmia.   - Repeat troponin T, ensure downtrending. Repeated EKG on arrival with no ischemic changes.   - Bedside echo in AM to assess for regional wall motion abnormalities.

## 2019-11-25 NOTE — PROGRESS NOTE ADULT - SUBJECTIVE AND OBJECTIVE BOX
Interventional Cardiology PA Precath Note      HPI:      ECHO 11/25/19:      PMH:    PSH:    ALL: NKDA, NKFA. Denies shellfish/Contrast dye allergy.  SocHX: Denies EtoH/TOB/IVDU  FHx:   MEDS:   acetaminophen   Tablet .. 650 milliGRAM(s) Oral every 4 hours PRN  aMIOdarone Infusion 1 mG/Min IV Continuous <Continuous>  aspirin enteric coated 81 milliGRAM(s) Oral daily  atorvastatin 80 milliGRAM(s) Oral at bedtime  cefTRIAXone Injectable. 1000 milliGRAM(s) IV Push every 24 hours  clopidogrel Tablet 75 milliGRAM(s) Oral daily  clopidogrel Tablet 225 milliGRAM(s) Oral once  dextrose 40% Gel 15 Gram(s) Oral once PRN  dextrose 5%. 1000 milliLiter(s) IV Continuous <Continuous>  dextrose 50% Injectable 12.5 Gram(s) IV Push once  dextrose 50% Injectable 25 Gram(s) IV Push once  dextrose 50% Injectable 25 Gram(s) IV Push once  donepezil 5 milliGRAM(s) Oral at bedtime  glucagon  Injectable 1 milliGRAM(s) IntraMuscular once PRN  heparin  Infusion 700 Unit(s)/Hr IV Continuous <Continuous>  influenza   Vaccine 0.5 milliLiter(s) IntraMuscular once  insulin lispro (HumaLOG) corrective regimen sliding scale   SubCutaneous Before meals and at bedtime  metoprolol tartrate 50 milliGRAM(s) Oral every 8 hours  phenazopyridine 100 milliGRAM(s) Oral every 8 hours    T(C): 36.6 (11-25-19 @ 18:00), Max: 38.2 (11-24-19 @ 20:24)  HR: 96 (11-25-19 @ 17:50) (96 - 194)  BP: 115/62 (11-25-19 @ 17:50) (94/60 - 125/62)  RR: 20 (11-25-19 @ 17:50) (18 - 31)  SpO2: 99% (11-25-19 @ 17:50) (86% - 99%)        NECK: No JVD, No carotid bruits B/L,  PULM:  CTA B/L No W/R/R  Chest : well healed sternotomy surgical scar   CARD: irreg irreg  S1 S2, No M/R/G  ABD: ND, +BS, NT, no masses  EXT: Warm, no pedal edema   NEURO: A & O x 3, no focal neurologic deficits  PULSES:                             13.6   9.72  )-----------( 182      ( 25 Nov 2019 07:59 )             42.4     11-25    135  |  98  |  24<H>  ----------------------------<  147<H>  3.5   |  27  |  0.79    Ca    8.4      25 Nov 2019 07:59  Phos  3.4     11-25  Mg     1.8     11-25    TPro  6.5  /  Alb  3.5  /  TBili  0.7  /  DBili  x   /  AST  140<H>  /  ALT  58<H>  /  AlkPhos  51  11-25    CARDIAC MARKERS ( 25 Nov 2019 02:41 )  x     / 1.29 ng/mL / 383 U/L / x     / 69.7 ng/mL  CARDIAC MARKERS ( 24 Nov 2019 20:18 )  x     / 1.47 ng/mL / 573 U/L / x     / 118.3 ng/mL  CARDIAC MARKERS ( 24 Nov 2019 12:44 )  x     / 1.52 ng/mL / 872 U/L / x     / 234.2 ng/mL  CARDIAC MARKERS ( 24 Nov 2019 05:52 )  x     / 1.08 ng/mL / 922 U/L / x     / 238.1 ng/mL  CARDIAC MARKERS ( 24 Nov 2019 02:15 )  x     / 0.88 ng/mL / 765 U/L / x     / 188.5 ng/mL  CARDIAC MARKERS ( 23 Nov 2019 21:41 )  1.137 ng/mL / x     / x     / x     / x            EKG:	A fib				  ASA III_____				Mallampati class: __III_______	            Anginal Class: ___III______    A/P:        Sedation Plan:    Moderate    Patient Is Suitable Candidate For Sedation?     Yes     Risks & benefits of procedure and sedation and risks and benefits for the alternative therapy have been explained to the patient in layman’s terms including but not limited to: allergic reaction, bleeding, infection, arrhythmia, respiratory compromise, renal and vascular compromise, limb damage, MI, CVA, emergent CABG/Vascular Surgery and death. Informed consent obtained and in chart. Interventional Cardiology PA Precath Note      HPI: 88 y/o female, former smoker, s/p bioprosthetic  aortic valve replacement in 2009, intestinal resection s/p ovarian cyst rupture , who intially  presented to the [facility] ED where she was noted to have SVT HR of 200 for which she received adenosine 6mg then 12mg without improvement. She was placed on amiodarone drip and converted to AF -130 bpm. Patient's BP was stable and her symptoms improved after pharmacologic cardioversion. Labs significant for elevated troponin peak  1.52 , downtrending since.       ECHO 11/25/19:  :     1. Patient was in Afib with RVR during the study.   2. Mild symmetric left ventricular hypertrophy.   3. Tachycardia precludes segmental wall motion evaluation. Grossly,   there is mild global left ventricular systolic dysfunction. LVEF 50%.   4. A bioprosthetic valve is noted in the aortic position which appears   well-seated. The peak transvalvular velovity is 1.70 m/s, the mean   transvalvular gradient is 7.8 mmHg, and the LVOT/AV velocity ratio is   0.43 m/s. There is no aortic regurgitation.   5. Moderate pulmonary hypertension, PASP is 57.2 mmHg.   6. No pericardial effusion.   7. Left pleural effusion.      PMH:  as above   PSH:  H/O aortic valve replacement 2009   S/P arthroscopic knee surgery   S/P cataract surgery   S/P ORIF (open reduction internal fixation) fracture.   s/p hernia repair   s/p   intestinal resection  ALL:  Denies shellfish/Contrast dye allergy.  SocHX: Denies EtoH/TOB/IVDU  FHx: NC   MEDS:   acetaminophen   Tablet .. 650 milliGRAM(s) Oral every 4 hours PRN  aMIOdarone Infusion 1 mG/Min IV Continuous <Continuous>  aspirin enteric coated 81 milliGRAM(s) Oral daily  atorvastatin 80 milliGRAM(s) Oral at bedtime  cefTRIAXone Injectable. 1000 milliGRAM(s) IV Push every 24 hours  clopidogrel Tablet 75 milliGRAM(s) Oral daily  clopidogrel Tablet 225 milliGRAM(s) Oral once  dextrose 40% Gel 15 Gram(s) Oral once PRN  dextrose 5%. 1000 milliLiter(s) IV Continuous <Continuous>  dextrose 50% Injectable 12.5 Gram(s) IV Push once  dextrose 50% Injectable 25 Gram(s) IV Push once  dextrose 50% Injectable 25 Gram(s) IV Push once  donepezil 5 milliGRAM(s) Oral at bedtime  glucagon  Injectable 1 milliGRAM(s) IntraMuscular once PRN  heparin  Infusion 700 Unit(s)/Hr IV Continuous <Continuous>  influenza   Vaccine 0.5 milliLiter(s) IntraMuscular once  insulin lispro (HumaLOG) corrective regimen sliding scale   SubCutaneous Before meals and at bedtime  metoprolol tartrate 50 milliGRAM(s) Oral every 8 hours  phenazopyridine 100 milliGRAM(s) Oral every 8 hours    T(C): 36.6 (11-25-19 @ 18:00), Max: 38.2 (11-24-19 @ 20:24)  HR: 96 (11-25-19 @ 17:50) (96 - 194)  BP: 115/62 (11-25-19 @ 17:50) (94/60 - 125/62)  RR: 20 (11-25-19 @ 17:50) (18 - 31)  SpO2: 99% (11-25-19 @ 17:50) (86% - 99%)        NECK: No JVD, No carotid bruits B/L,  PULM:  CTA B/L No W/R/R  Chest : well healed sternotomy surgical scar   CARD: irreg irreg  S1 S2, No M/R/G  ABD: ND, +BS, NT, no masses  EXT: Warm, no pedal edema   NEURO: A & O x 3, no focal neurologic deficits  PULSES:                             13.6   9.72  )-----------( 182      ( 25 Nov 2019 07:59 )             42.4     11-25    135  |  98  |  24<H>  ----------------------------<  147<H>  3.5   |  27  |  0.79    Ca    8.4      25 Nov 2019 07:59  Phos  3.4     11-25  Mg     1.8     11-25    TPro  6.5  /  Alb  3.5  /  TBili  0.7  /  DBili  x   /  AST  140<H>  /  ALT  58<H>  /  AlkPhos  51  11-25    CARDIAC MARKERS ( 25 Nov 2019 02:41 )  x     / 1.29 ng/mL / 383 U/L / x     / 69.7 ng/mL  CARDIAC MARKERS ( 24 Nov 2019 20:18 )  x     / 1.47 ng/mL / 573 U/L / x     / 118.3 ng/mL  CARDIAC MARKERS ( 24 Nov 2019 12:44 )  x     / 1.52 ng/mL / 872 U/L / x     / 234.2 ng/mL  CARDIAC MARKERS ( 24 Nov 2019 05:52 )  x     / 1.08 ng/mL / 922 U/L / x     / 238.1 ng/mL  CARDIAC MARKERS ( 24 Nov 2019 02:15 )  x     / 0.88 ng/mL / 765 U/L / x     / 188.5 ng/mL  CARDIAC MARKERS ( 23 Nov 2019 21:41 )  1.137 ng/mL / x     / x     / x     / x            EKG:	A fib				  ASA III_____				Mallampati class: __III_______	            Anginal Class: ___III______    A/P:        Sedation Plan:    Moderate    Patient Is Suitable Candidate For Sedation?     Yes     Risks & benefits of procedure and sedation and risks and benefits for the alternative therapy have been explained to the patient in layman’s terms including but not limited to: allergic reaction, bleeding, infection, arrhythmia, respiratory compromise, renal and vascular compromise, limb damage, MI, CVA, emergent CABG/Vascular Surgery and death. Informed consent obtained and in chart. Interventional Cardiology PA Precath Note      HPI:         ECHO 11/25/19:  :     1. Patient was in Afib with RVR during the study.   2. Mild symmetric left ventricular hypertrophy.   3. Tachycardia precludes segmental wall motion evaluation. Grossly,   there is mild global left ventricular systolic dysfunction. LVEF 50%.   4. A bioprosthetic valve is noted in the aortic position which appears   well-seated. The peak transvalvular velovity is 1.70 m/s, the mean   transvalvular gradient is 7.8 mmHg, and the LVOT/AV velocity ratio is   0.43 m/s. There is no aortic regurgitation.   5. Moderate pulmonary hypertension, PASP is 57.2 mmHg.   6. No pericardial effusion.   7. Left pleural effusion.      PMH:  as above   PSH:  H/O aortic valve replacement 2009   S/P arthroscopic knee surgery   S/P cataract surgery   S/P ORIF (open reduction internal fixation) fracture.   s/p hernia repair   s/p   intestinal resection  ALL:  Denies shellfish/Contrast dye allergy.  SocHX: Denies EtoH/TOB/IVDU  FHx: NC   MEDS:   acetaminophen   Tablet .. 650 milliGRAM(s) Oral every 4 hours PRN  aMIOdarone Infusion 1 mG/Min IV Continuous <Continuous>  aspirin enteric coated 81 milliGRAM(s) Oral daily, s/p load 325mg 11/24/19  atorvastatin 80 milliGRAM(s) Oral at bedtime  cefTRIAXone Injectable. 1000 milliGRAM(s) IV Push every 24 hours  clopidogrel Tablet 75 milliGRAM(s) Oral daily  clopidogrel Tablet 225 milliGRAM(s) Oral once in cath holding, s/p 300mg plavix load on 11/24/19  dextrose 40% Gel 15 Gram(s) Oral once PRN  dextrose 5%. 1000 milliLiter(s) IV Continuous <Continuous>  dextrose 50% Injectable 12.5 Gram(s) IV Push once  dextrose 50% Injectable 25 Gram(s) IV Push once  dextrose 50% Injectable 25 Gram(s) IV Push once  donepezil 5 milliGRAM(s) Oral at bedtime  glucagon  Injectable 1 milliGRAM(s) IntraMuscular once PRN  heparin  Infusion 700 Unit(s)/Hr IV Continuous <Continuous>  influenza   Vaccine 0.5 milliLiter(s) IntraMuscular once  insulin lispro (HumaLOG) corrective regimen sliding scale   SubCutaneous Before meals and at bedtime  metoprolol tartrate 50 milliGRAM(s) Oral every 8 hours  phenazopyridine 100 milliGRAM(s) Oral every 8 hours    T(C): 36.6 (11-25-19 @ 18:00), Max: 38.2 (11-24-19 @ 20:24)  HR: 96 (11-25-19 @ 17:50) (96 - 194)  BP: 115/62 (11-25-19 @ 17:50) (94/60 - 125/62)  RR: 20 (11-25-19 @ 17:50) (18 - 31)  SpO2: 99% (11-25-19 @ 17:50) (86% - 99%) on 2 L/M NC O2    NECK: No JVD, No carotid bruits B/L,  PULM: decreased BS on L base  Chest : well healed sternotomy surgical scar   CARD: irreg irreg  S1 S2, No M/R/G  ABD: ND, +BS, NT, no masses  EXT: Warm, no pedal edema   NEURO: A & O x 3, no focal neurologic deficits  PULSES: radial 2 + b/l, femoral 2+ b/l ( no bruits b/l), DP/PT 1+ b/l                              13.6   9.72  )-----------( 182      ( 25 Nov 2019 07:59 )             42.4     11-25    135  |  98  |  24<H>  ----------------------------<  147<H>  3.5   |  27  |  0.79    Ca    8.4      25 Nov 2019 07:59  Phos  3.4     11-25  Mg     1.8     11-25    TPro  6.5  /  Alb  3.5  /  TBili  0.7  /  DBili  x   /  AST  140<H>  /  ALT  58<H>  /  AlkPhos  51  11-25    CARDIAC MARKERS ( 25 Nov 2019 02:41 )  x     / 1.29 ng/mL / 383 U/L / x     / 69.7 ng/mL  CARDIAC MARKERS ( 24 Nov 2019 20:18 )  x     / 1.47 ng/mL / 573 U/L / x     / 118.3 ng/mL  CARDIAC MARKERS ( 24 Nov 2019 12:44 )  x     / 1.52 ng/mL / 872 U/L / x     / 234.2 ng/mL  CARDIAC MARKERS ( 24 Nov 2019 05:52 )  x     / 1.08 ng/mL / 922 U/L / x     / 238.1 ng/mL  CARDIAC MARKERS ( 24 Nov 2019 02:15 )  x     / 0.88 ng/mL / 765 U/L / x     / 188.5 ng/mL  CARDIAC MARKERS ( 23 Nov 2019 21:41 )  1.137 ng/mL / x     / x     / x     / x            EKG:	A fib				  ASA III_____				Mallampati class: __III_______	            Anginal Class: ___III______    A/P:        Sedation Plan:    Moderate    Patient Is Suitable Candidate For Sedation?     Yes     Risks & benefits of procedure and sedation and risks and benefits for the alternative therapy have been explained to the patient in layman’s terms including but not limited to: allergic reaction, bleeding, infection, arrhythmia, respiratory compromise, renal and vascular compromise, limb damage, MI, CVA, emergent CABG/Vascular Surgery and death. Informed consent obtained and in chart. Interventional Cardiology PA Precath Note      HPI:  88 y/o female,  independent at baseline,  PMH DM2, HTN,  s/p bioprosthetic  aortic valve replacement in 2009,  who initially  presented to the [facility] ED on 11/23/19 with 10/10  new onset  chest discomfort  radiating to both arms and palpitations,  found to be in  SVT HR of 200 for which she received adenosine 6mg then 12mg without improvement. She was placed on amiodarone drip and converted to AF -130 bpm. Patient's BP was stable and her symptoms improved after pharmacologic cardioversion. Labs significant for elevated troponin peak  1.52 , downtrending since. Patient admitted to [facility] with hospital course notable for another  episode of symptomatic tachyarrhythmia  overnight 11/24-11/25.  Since then patient has been stable in A fib VR  on IV amio drip,  1 time dose of Dig 0.5mg IVP,  lopressor  50mg po q8h, heparin drip.  Patient with intermittent chest discomfort described as burning.  Given patient's symptoms, A fib RVR, elevated cardiac enzymes, patient is referred for a cardiac cath to evaluate for CAD.    Of note, patient is on IV ceftriaxone for  UTI with T max 99, afebrile today.      ECHO 11/25/19:  :     1. Patient was in Afib with RVR during the study.   2. Mild symmetric left ventricular hypertrophy.   3. Tachycardia precludes segmental wall motion evaluation. Grossly,   there is mild global left ventricular systolic dysfunction. LVEF 50%.   4. A bioprosthetic valve is noted in the aortic position which appears   well-seated. The peak transvalvular velocity is 1.70 m/s, the mean   transvalvular gradient is 7.8 mmHg, and the LVOT/AV velocity ratio is   0.43 m/s. There is no aortic regurgitation.   5. Moderate pulmonary hypertension, PASP is 57.2 mmHg.   6. No pericardial effusion.   7. Left pleural effusion.      PMH:  as above   PSH:  H/O aortic valve replacement 2009   S/P arthroscopic knee surgery   S/P cataract surgery   S/P ORIF (open reduction internal fixation) fracture.   s/p hernia repair   s/p   intestinal resection    ALL:  Denies shellfish/Contrast dye allergy.  codeine (Unknown)  Demerol HCl (Unknown)  indomethacin (Unknown)  morphine (Unknown)  Percodan (Unknown)  Tequin (Unknown)          SocHX: Denies EtoH/TOB/IVDU  FHx: NC   CURRENT MEDS:   acetaminophen   Tablet .. 650 milliGRAM(s) Oral every 4 hours PRN  aMIOdarone Infusion 1 mG/Min IV Continuous <Continuous>  aspirin enteric coated 81 milliGRAM(s) Oral daily, s/p load 325mg 11/24/19  atorvastatin 80 milliGRAM(s) Oral at bedtime  cefTRIAXone Injectable. 1000 milliGRAM(s) IV Push every 24 hours  clopidogrel Tablet 75 milliGRAM(s) Oral daily  clopidogrel Tablet 225 milliGRAM(s) Oral once in cath holding, s/p 300mg plavix load on 11/24/19  dextrose 40% Gel 15 Gram(s) Oral once PRN  dextrose 5%. 1000 milliLiter(s) IV Continuous <Continuous>  dextrose 50% Injectable 12.5 Gram(s) IV Push once  dextrose 50% Injectable 25 Gram(s) IV Push once  dextrose 50% Injectable 25 Gram(s) IV Push once  donepezil 5 milliGRAM(s) Oral at bedtime  glucagon  Injectable 1 milliGRAM(s) IntraMuscular once PRN  heparin  Infusion 700 Unit(s)/Hr IV Continuous <Continuous>  influenza   Vaccine 0.5 milliLiter(s) IntraMuscular once  insulin lispro (HumaLOG) corrective regimen sliding scale   SubCutaneous Before meals and at bedtime  metoprolol tartrate 50 milliGRAM(s) Oral every 8 hours  phenazopyridine 100 milliGRAM(s) Oral every 8 hours    T(C): 36.6 (11-25-19 @ 18:00), Max: 38.2 (11-24-19 @ 20:24)  HR: 96 (11-25-19 @ 17:50) (96 - 194)  BP: 115/62 (11-25-19 @ 17:50) (94/60 - 125/62)  RR: 20 (11-25-19 @ 17:50) (18 - 31)  SpO2: 99% (11-25-19 @ 17:50) (86% - 99%) on 2 L/M NC O2    NECK: No JVD, No carotid bruits B/L,  PULM: decreased BS on L base  Chest : well healed sternotomy surgical scar   CARD: irreg irreg  S1 S2, No M/R/G  ABD: ND, +BS, NT, no masses  EXT: Warm, no pedal edema   NEURO: A & O x 3, no focal neurologic deficits  PULSES: radial 2 + b/l, femoral 2+ b/l ( no bruits b/l), DP/PT 1+ b/l                              13.6   9.72  )-----------( 182      ( 25 Nov 2019 07:59 )             42.4     11-25    135  |  98  |  24<H>  ----------------------------<  147<H>  3.5   |  27  |  0.79    Ca    8.4      25 Nov 2019 07:59  Phos  3.4     11-25  Mg     1.8     11-25    TPro  6.5  /  Alb  3.5  /  TBili  0.7  /  DBili  x   /  AST  140<H>  /  ALT  58<H>  /  AlkPhos  51  11-25    CARDIAC MARKERS ( 25 Nov 2019 02:41 )  x     / 1.29 ng/mL / 383 U/L / x     / 69.7 ng/mL  CARDIAC MARKERS ( 24 Nov 2019 20:18 )  x     / 1.47 ng/mL / 573 U/L / x     / 118.3 ng/mL  CARDIAC MARKERS ( 24 Nov 2019 12:44 )  x     / 1.52 ng/mL / 872 U/L / x     / 234.2 ng/mL  CARDIAC MARKERS ( 24 Nov 2019 05:52 )  x     / 1.08 ng/mL / 922 U/L / x     / 238.1 ng/mL  CARDIAC MARKERS ( 24 Nov 2019 02:15 )  x     / 0.88 ng/mL / 765 U/L / x     / 188.5 ng/mL  CARDIAC MARKERS ( 23 Nov 2019 21:41 )  1.137 ng/mL / x     / x     / x     / x            EKG:	A fib				  ASA III_____				Mallampati class: __III_______	            Anginal Class: ___III______    A/P:  88 y/o female,  independent at baseline,  PMH DM2, HTN,  s/p bioprosthetic  aortic valve replacement in 2009, admitted to [facility] with  chest discomfort, newly dx  SVT/A fib RVR rate difficult to control, noted to have elevated cardiac enzymes with recurrent chest burning and now presents for a recommended cardiac cath.    patient required IV lasix during hospital course for flash pulm edema, will not give IVF pre-cath  plavix 300mg loaded 11/24 and 300mg 11/25  asa 325mg 11/24 and 81 11/25  consent in chart      Sedation Plan:    Moderate    Patient Is Suitable Candidate For Sedation?     Yes     Risks & benefits of procedure and sedation and risks and benefits for the alternative therapy have been explained to the patient in layman’s terms including but not limited to: allergic reaction, bleeding, infection, arrhythmia, respiratory compromise, renal and vascular compromise, limb damage, MI, CVA, emergent CABG/Vascular Surgery and death. Informed consent obtained and in chart. Interventional Cardiology PA Precath Note      HPI:  86 y/o female,  independent at baseline,  PMH DM2, HTN,  s/p bioprosthetic  aortic valve replacement in 2009,  who initially  presented to the [facility] ED on 11/23/19 with 10/10  new onset  chest discomfort  radiating to both arms and palpitations,  found to be in  SVT HR of 200 for which she received adenosine 6mg then 12mg without improvement. She was placed on amiodarone drip and converted to AF -130 bpm. Patient's BP was stable and her symptoms improved after pharmacologic cardioversion. Labs significant for elevated troponin peak  1.52 on 11/24 , downtrending since. Patient admitted to [facility]  11/23 with hospital course notable for another  episode of symptomatic tachyarrhythmia  overnight 11/24-11/25 and flush pulmonary edema.  Since then patient has been in difficult to rate control A fib VR  on IV amio drip,  1 time dose of Dig 0.5mg IVP,  lopressor  50mg po q8h, and  heparin drip.  Patient with intermittent chest discomfort described as burning.  Given patient's symptoms, A fib RVR, elevated cardiac enzymes, patient is referred for a cardiac cath to evaluate for CAD.    Of note, patient is on IV ceftriaxone for  UTI with T max 99, afebrile today.      ECHO 11/25/19:  :     1. Patient was in Afib with RVR during the study.   2. Mild symmetric left ventricular hypertrophy.   3. Tachycardia precludes segmental wall motion evaluation. Grossly,   there is mild global left ventricular systolic dysfunction. LVEF 50%.   4. A bioprosthetic valve is noted in the aortic position which appears   well-seated. The peak transvalvular velocity is 1.70 m/s, the mean   transvalvular gradient is 7.8 mmHg, and the LVOT/AV velocity ratio is   0.43 m/s. There is no aortic regurgitation.   5. Moderate pulmonary hypertension, PASP is 57.2 mmHg.   6. No pericardial effusion.   7. Left pleural effusion.      PMH:  as above   PSH:  H/O aortic valve replacement 2009   S/P arthroscopic knee surgery   S/P cataract surgery   S/P ORIF (open reduction internal fixation) fracture.   s/p hernia repair   s/p   intestinal resection    ALL:  Denies shellfish/Contrast dye allergy.  codeine (Unknown)  Demerol HCl (Unknown)  indomethacin (Unknown)  morphine (Unknown)  Percodan (Unknown)  Tequin (Unknown)          SocHX: Denies EtoH/TOB/IVDU  FHx: NC   CURRENT MEDS:   acetaminophen   Tablet .. 650 milliGRAM(s) Oral every 4 hours PRN  aMIOdarone Infusion 1 mG/Min IV Continuous <Continuous>  aspirin enteric coated 81 milliGRAM(s) Oral daily, s/p load 325mg 11/24/19  atorvastatin 80 milliGRAM(s) Oral at bedtime  cefTRIAXone Injectable. 1000 milliGRAM(s) IV Push every 24 hours  clopidogrel Tablet 75 milliGRAM(s) Oral daily  clopidogrel Tablet 225 milliGRAM(s) Oral once in cath holding, s/p 300mg plavix load on 11/24/19  dextrose 40% Gel 15 Gram(s) Oral once PRN  dextrose 5%. 1000 milliLiter(s) IV Continuous <Continuous>  dextrose 50% Injectable 12.5 Gram(s) IV Push once  dextrose 50% Injectable 25 Gram(s) IV Push once  dextrose 50% Injectable 25 Gram(s) IV Push once  donepezil 5 milliGRAM(s) Oral at bedtime  glucagon  Injectable 1 milliGRAM(s) IntraMuscular once PRN  heparin  Infusion 700 Unit(s)/Hr IV Continuous <Continuous>  influenza   Vaccine 0.5 milliLiter(s) IntraMuscular once  insulin lispro (HumaLOG) corrective regimen sliding scale   SubCutaneous Before meals and at bedtime  metoprolol tartrate 50 milliGRAM(s) Oral every 8 hours  phenazopyridine 100 milliGRAM(s) Oral every 8 hours    T(C): 36.6 (11-25-19 @ 18:00), Max: 38.2 (11-24-19 @ 20:24)  HR: 96 (11-25-19 @ 17:50) (96 - 194)  BP: 115/62 (11-25-19 @ 17:50) (94/60 - 125/62)  RR: 20 (11-25-19 @ 17:50) (18 - 31)  SpO2: 99% (11-25-19 @ 17:50) (86% - 99%) on 2 L/M NC O2    NECK: No JVD, No carotid bruits B/L,  PULM: decreased BS on L base  Chest : well healed sternotomy surgical scar   CARD: irreg irreg  S1 S2, No M/R/G  ABD: ND, +BS, NT, no masses  EXT: Warm, no pedal edema   NEURO: A & O x 3, no focal neurologic deficits  PULSES: radial 2 + b/l, femoral 2+ b/l ( no bruits b/l), DP/PT 1+ b/l                              13.6   9.72  )-----------( 182      ( 25 Nov 2019 07:59 )             42.4     11-25    135  |  98  |  24<H>  ----------------------------<  147<H>  3.5   |  27  |  0.79    Ca    8.4      25 Nov 2019 07:59  Phos  3.4     11-25  Mg     1.8     11-25    TPro  6.5  /  Alb  3.5  /  TBili  0.7  /  DBili  x   /  AST  140<H>  /  ALT  58<H>  /  AlkPhos  51  11-25    CARDIAC MARKERS ( 25 Nov 2019 02:41 )  x     / 1.29 ng/mL / 383 U/L / x     / 69.7 ng/mL  CARDIAC MARKERS ( 24 Nov 2019 20:18 )  x     / 1.47 ng/mL / 573 U/L / x     / 118.3 ng/mL  CARDIAC MARKERS ( 24 Nov 2019 12:44 )  x     / 1.52 ng/mL / 872 U/L / x     / 234.2 ng/mL  CARDIAC MARKERS ( 24 Nov 2019 05:52 )  x     / 1.08 ng/mL / 922 U/L / x     / 238.1 ng/mL  CARDIAC MARKERS ( 24 Nov 2019 02:15 )  x     / 0.88 ng/mL / 765 U/L / x     / 188.5 ng/mL  CARDIAC MARKERS ( 23 Nov 2019 21:41 )  1.137 ng/mL / x     / x     / x     / x            EKG:	A fib				  ASA III_____				Mallampati class: __III_______	            Anginal Class: ___III______    A/P:  86 y/o female,  independent at baseline,  PMH DM2, HTN,  s/p bioprosthetic  aortic valve replacement in 2009, admitted to [facility] with  chest discomfort, newly dx  SVT/A fib RVR rate difficult to control, noted to have elevated cardiac enzymes with recurrent chest burning and now presents for a recommended cardiac cath.    patient required IV lasix during hospital course for flash pulm edema on 11/24, appears euvolemic at present   plavix 300mg loaded 11/24 and 300mg 11/25  asa 325mg 11/24 and 81 11/25  consent in chart      Sedation Plan:    Moderate    Patient Is Suitable Candidate For Sedation?     Yes     Risks & benefits of procedure and sedation and risks and benefits for the alternative therapy have been explained to the patient in layman’s terms including but not limited to: allergic reaction, bleeding, infection, arrhythmia, respiratory compromise, renal and vascular compromise, limb damage, MI, CVA, emergent CABG/Vascular Surgery and death. Informed consent obtained and in chart.

## 2019-11-25 NOTE — PROGRESS NOTE ADULT - PROBLEM SELECTOR PLAN 6
Cr of 1.32 on baseline of 0.7.   - Ordered urine studies to assess FeNa/FeUrea (on diuretics)  - May be 2/2 dehydration from diuretic abuse    #UTI  -Ceftriaxone 1g for 3 days Cr of 1.32 on baseline of 0.7.   - Ordered urine studies to assess FeNa/FeUrea (on diuretics)  - May be 2/2 dehydration from diuretic abuse    #UTI  -Ceftriaxone 1g for 3 days  -Pyridium

## 2019-11-25 NOTE — PROGRESS NOTE ADULT - PROBLEM SELECTOR PLAN 2
Patient briefly converted to AF RVR while undergoing treatment for SVT.   - CHADSVASC of 3, on heparin drip at present. Trend PTT.  - Rate improving on amio drip, AF has not resolved.

## 2019-11-25 NOTE — PROGRESS NOTE ADULT - SUBJECTIVE AND OBJECTIVE BOX
INTERVAL HPI/OVERNIGHT EVENTS: As per nurse and patient, patient went into SVT overnight. Patient was in acute distress, responsive and verbalizing "I feel terrible, I can't breathe." Rapid response was called. Vagal maneuvers and adenosine (Adenosine 6 mg. Gave adenosine 12 mg) were unsuccessful in controlling the tachy arrythmia.  Gave amio 150 mg IV push and rate improved to 177 but remained fixed at that rate, then returned to 200. Resumed amio drip at 1 mg/hr and gave amio push 150 again, at which time rate broke to 130s and patient in a-fib with RVR on monitor. Gave  cc bolus over 15 min. Rectal temp 100.2. Lactate 3.5.  Patient now still in a fib, lactate has corrected, patient is at ease and not in distress. Only complaint at this time is burning pain associated with her UTI. Plan to continue patient on lopressor 50mg q8 and amiodarone drip. Patient received a 1 time dose of Dig .5mg IVP.      SUBJECTIVE: Patient was seen and examined at bedside.  No complaints at this time. Patient denies: fever, chills, dizziness, weakness, HA, Changes in vision, CP, palpitations, SOB, cough, N/V/D/C, dysuria, changes in bowel movements, LE edema. ROS otherwise negative.    VITAL SIGNS:  T(F): 99.2 (11-25-19 @ 10:34)  HR: 114 (11-25-19 @ 13:17)  BP: 94/60 (11-25-19 @ 12:23)  RR: 30 (11-25-19 @ 13:17)  SpO2: 96% (11-25-19 @ 12:23)  Wt(kg): --    PHYSICAL EXAM:    Constitutional: WDWN resting comfortably in bed; NAD  	Head: NC/AT  	Eyes: PERRL, EOMI, anicteric sclera  	ENT: no nasal discharge; uvula midline, no oropharyngeal erythema or exudates; MMM  	Neck: supple; no JVD or thyromegaly  	Respiratory: CTA B/L; no W/R/R, no retractions  	Cardiac: Tachycardic, regular. +S1/S2; RRR; no M/R/G; PMI non-displaced  	Gastrointestinal: soft, NT/ND; no rebound or guarding; +BSx4  	Genitourinary: normal external genitalia  	Back: spine midline, no bony tenderness or step-offs; no CVAT B/L  	Extremities: WWP, no clubbing or cyanosis; no peripheral edema  	Musculoskeletal: NROM x4; no joint swelling, tenderness or erythema  	Vascular: 2+ radial, femoral, DP/PT pulses B/L  	Dermatologic: skin warm, dry and intact; no rashes, wounds, or scars  	Lymphatic: no submandibular or cervical LAD  	Neurologic: AAOx3; CNII-XII grossly intact; no focal deficits  Psychiatric: affect and characteristics of appearance, verbalizations, behaviors are appropriate    MEDICATIONS  (STANDING):  aMIOdarone Infusion 1 mG/Min (33.333 mL/Hr) IV Continuous <Continuous>  aspirin enteric coated 81 milliGRAM(s) Oral daily  atorvastatin 80 milliGRAM(s) Oral at bedtime  cefTRIAXone Injectable. 1000 milliGRAM(s) IV Push every 24 hours  clopidogrel Tablet 75 milliGRAM(s) Oral daily  dextrose 5%. 1000 milliLiter(s) (50 mL/Hr) IV Continuous <Continuous>  dextrose 50% Injectable 12.5 Gram(s) IV Push once  dextrose 50% Injectable 25 Gram(s) IV Push once  dextrose 50% Injectable 25 Gram(s) IV Push once  donepezil 5 milliGRAM(s) Oral at bedtime  heparin  Infusion 700 Unit(s)/Hr (7 mL/Hr) IV Continuous <Continuous>  influenza   Vaccine 0.5 milliLiter(s) IntraMuscular once  insulin lispro (HumaLOG) corrective regimen sliding scale   SubCutaneous Before meals and at bedtime  metoprolol tartrate 50 milliGRAM(s) Oral every 8 hours  phenazopyridine 100 milliGRAM(s) Oral every 8 hours    MEDICATIONS  (PRN):  acetaminophen   Tablet .. 650 milliGRAM(s) Oral every 4 hours PRN Temp greater or equal to 38C (100.4F), Mild Pain (1 - 3)  dextrose 40% Gel 15 Gram(s) Oral once PRN Blood Glucose LESS THAN 70 milliGRAM(s)/deciliter  glucagon  Injectable 1 milliGRAM(s) IntraMuscular once PRN Glucose LESS THAN 70 milligrams/deciliter      Allergies    codeine (Unknown)  Demerol HCl (Unknown)  indomethacin (Unknown)  morphine (Unknown)  Percodan (Unknown)  Tequin (Unknown)    Intolerances        LABS:                        13.6   9.72  )-----------( 182      ( 25 Nov 2019 07:59 )             42.4     11-25    135  |  98  |  24<H>  ----------------------------<  147<H>  3.5   |  27  |  0.79    Ca    8.4      25 Nov 2019 07:59  Phos  3.4     11-25  Mg     1.8     11-25    TPro  6.5  /  Alb  3.5  /  TBili  0.7  /  DBili  x   /  AST  140<H>  /  ALT  58<H>  /  AlkPhos  51  11-25    PT/INR - ( 25 Nov 2019 01:11 )   PT: 11.5 sec;   INR: 1.02          PTT - ( 25 Nov 2019 12:22 )  PTT:37.2 sec  Urinalysis Basic - ( 24 Nov 2019 02:37 )    Color: Yellow / Appearance: Clear / SG: >=1.030 / pH: x  Gluc: x / Ketone: NEGATIVE  / Bili: Negative / Urobili: 0.2 E.U./dL   Blood: x / Protein: NEGATIVE mg/dL / Nitrite: POSITIVE   Leuk Esterase: Trace / RBC: < 5 /HPF / WBC 5-10 /HPF   Sq Epi: x / Non Sq Epi: 0-5 /HPF / Bacteria: Present /HPF        RADIOLOGY & ADDITIONAL TESTS:  Reviewed

## 2019-11-25 NOTE — PROVIDER CONTACT NOTE (CHANGE IN STATUS NOTIFICATION) - SITUATION
Stopped amiodarone gtt per order and ready to administer PO amiodarone 400mg, Pt became anxious, HR increased to 170s-190s bpm. Pt reported unable to breathe, Dr. Salazar notified.

## 2019-11-25 NOTE — PROVIDER CONTACT NOTE (CHANGE IN STATUS NOTIFICATION) - BACKGROUND
Pt has been Afib on monitor, HR changes between 110s-160s, has been on amiodarone gtt @16.7mL/hr for 16 hours, doctor ordered to stop infusion and bridge to PO dose per protocol.

## 2019-11-25 NOTE — PROGRESS NOTE ADULT - PROBLEM SELECTOR PLAN 7
Stable, will assess on echocardiogram tomorrow. Stable, will assess on echocardiogram tomorrow.    #HTN  -Home Meds: HCTZ-triamterene  -hold home meds  #HLD  -Lipitor 80mg

## 2019-11-25 NOTE — PROGRESS NOTE ADULT - PROBLEM SELECTOR PLAN 4
Patient has been taking HCTZ-triamterene 37.5/25 as well as levothyroxine 50mcg and liothyronine 5mcg to lose weight. She denies any other medical indication for these medications. Discussed with patient the risks these medications pose including electrolyte abnormalities, arrhythmias, and possible injury/death. She expressed understanding and is agreeable to d/c of medications for now but would like to discuss with her endocrinologist.  - Likely contributed to current arrhythmia, will discontinue these medications on discharge, discuss with patient's primary and endocrinologist.  - Sent off thyroid function panel, tpo, thyroglobulin

## 2019-11-25 NOTE — PROGRESS NOTE ADULT - SUBJECTIVE AND OBJECTIVE BOX
CORONARY ANGIOGRAPHY  11/25/19    Indication: NSTEMI    Conclusion  Frailty Index: 5    1. Coronary Angiography  LM: normal  LAD: normal  LCx: dominant, normal  RCA: small, non-dominant, normal     Recommendations  normal coronary arteries  left dominant system  f/u with Dr. Le    Access: R CFA, angioseal

## 2019-11-25 NOTE — PROVIDER CONTACT NOTE (CHANGE IN STATUS NOTIFICATION) - ACTION/TREATMENT ORDERED:
Rapid respond initiated @2347, team arrived @2351. Increased O2 to 4L, EKG done, lab sent. Started one new IV line. Total amiodarone 150mg x2 IVP, adenosine 6mg+12mg IVP, restarted amiodarone drip @1mg/min, NS 250mL bolus started. Pt VS stabilized after Tx, HR down to 101, /55, satting 97%.  Continues to monitor rhythm and HR.

## 2019-11-25 NOTE — PROGRESS NOTE ADULT - PROBLEM SELECTOR PLAN 9
1.       PCP Contacted on Admission: (Y/N) --> Name & Phone #:  2.       Date of Contact with PCP:  3.       PCP Contacted at Discharge: (Y/N)  4.       Summary of Handoff Given to PCP:  5.       Post-Discharge Appointment Date and Location: [facility]

## 2019-11-25 NOTE — PROGRESS NOTE ADULT - PROBLEM SELECTOR PLAN 1
Patient presented with narrow complex tachycardia of 200bpm at [facility], no response to adenosine, responded to amiodarone drip, briefly in AF RVR as below, now in NSR on most recent EKG and telemetry on arrival.  - Continue telemetry  - Echocardiogram in AM to assess for structural heart disease  - Continue amiodarone drip which converted patient in [facility].   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  -Lopressor 50mg q8

## 2019-11-25 NOTE — CONSULT NOTE ADULT - SUBJECTIVE AND OBJECTIVE BOX
HPI:  Patient is an 88 y/o female w hx of aortic valve replacement in , intestinal resection s/p ovarian cyst rupture presenting for evaluation of rapid heart rate. Patient states that she was coming back from a party after having two glasses of champagne when she began experiencing discomfort in her chest, abdomen, and both arms with associated lightheadedness. She presented to the ED where she was noted to have HR of 200 for which she received adenosine 6mg then 12mg without improvement. She was placed on amiodarone drip and converted to AF -130 bpm. Patient's BP was stable and her symptoms improved after pharmacologic cardioversion. She is currently chest pain free, denies shortness of breath. She denies any recent anginal symptoms and has good functional status, frequently walking several blocks.   Of note, patient states she uses triamterene and levothyroxine for weight loss, denies any other medical indication for these medications. She is unsure of the doses. (2019 01:55)  Pt reports also using metformin for weight loss, 500mg twice daily denies hx of DM, pre-DM.   Pt denies hx of known thyroid disease, reports her "weight loss medications" are managed by Dr. Awais Garibay on .  Pt reports these medications have not helped her to lose weight and she feels she has "an extra stomach."    Review of pharmacy records suggests pt is taking cytomel 5mg once daily, levothyroxine 50mcg once daily, and metformin as above.   pt does not adhere to a low carb diet.  She reports she is active in the community.    She reports feeling terrible today due to "nerve pain" in her right arm.    She is NPO for cardiac cath today.  No hx of infertility.      PMH & Surgical Hx:SVT  Osteoporosis  Tibial fracture  Aortic valve replaced  Aortic stenosis  Essential hypertension  Hypothyroid  SVT (supraventricular tachycardia)  Diabetes mellitus, type II  Transition of care performed with sharing of clinical summary  Nutrition, metabolism, and development symptoms  Aortic valve replaced  JUMA (acute kidney injury)  S/P ORIF (open reduction internal fixation) fracture  S/P arthroscopic knee surgery  S/P cataract surgery  H/O aortic valve replacement    FH:  DM: denies  Thyroid: denies  Autoimmune: denies  Other:    SH:  Smoking: denies  Etoh: daily cocktail  Recreational Drugs: denies  Social Life: lives alone, no kids, active.      Current Meds:  acetaminophen   Tablet .. 650 milliGRAM(s) Oral every 4 hours PRN  aMIOdarone Infusion 1 mG/Min IV Continuous <Continuous>  aspirin enteric coated 81 milliGRAM(s) Oral daily  atorvastatin 80 milliGRAM(s) Oral at bedtime  cefTRIAXone Injectable. 1000 milliGRAM(s) IV Push every 24 hours  clopidogrel Tablet 75 milliGRAM(s) Oral daily  dextrose 40% Gel 15 Gram(s) Oral once PRN  dextrose 5%. 1000 milliLiter(s) IV Continuous <Continuous>  dextrose 50% Injectable 12.5 Gram(s) IV Push once  dextrose 50% Injectable 25 Gram(s) IV Push once  dextrose 50% Injectable 25 Gram(s) IV Push once  donepezil 5 milliGRAM(s) Oral at bedtime  glucagon  Injectable 1 milliGRAM(s) IntraMuscular once PRN  heparin  Infusion 700 Unit(s)/Hr IV Continuous <Continuous>  influenza   Vaccine 0.5 milliLiter(s) IntraMuscular once  insulin lispro (HumaLOG) corrective regimen sliding scale   SubCutaneous Before meals and at bedtime  metoprolol tartrate 50 milliGRAM(s) Oral every 8 hours  phenazopyridine 100 milliGRAM(s) Oral every 8 hours      Allergies:  codeine (Unknown)  Demerol HCl (Unknown)  indomethacin (Unknown)  morphine (Unknown)  Percodan (Unknown)  Tequin (Unknown)      ROS:  Denies the following except as indicated.    General: weight loss/weight gain, decreased appetite, fatigue  Eyes: Blurry vision, double vision, visual changes  ENT: Throat pain, changes in voice,   CV: palpitations, SOB, CP, cough  GI: NVD, difficulty swallowing, abdominal pain  : polyuria, dysuria  Endo: abnormal menses, temperature intolerance, decreased libido  MSK: weakness, joint pain  Skin: rash, dryness, diaphoresis  Heme: Easy bruising,bleeding  Neuro: HA, dizziness, lightheadedness, numbness tingling  Psych: Anxiety, Depression    Vital Signs Last 24 Hrs  T(C): 36.6 (2019 18:00), Max: 38.2 (2019 20:24)  T(F): 97.8 (2019 18:00), Max: 100.7 (2019 20:24)  HR: 96 (2019 17:50) (96 - 194)  BP: 115/62 (2019 17:50) (94/60 - 117/61)  BP(mean): 86 (2019 17:50) (42 - 86)  RR: 20 (2019 17:50) (18 - 30)  SpO2: 99% (2019 17:50) (86% - 99%)  Height (cm): 149.9 ( @ 00:44)  Weight (kg): 57.7 ( @ 00:44)  BMI (kg/m2): 25.7 ( @ 00:44)    Constitutional: wn/wd in NAD.   HEENT: NCAT, MMM, OP clear, EOMI, , no proptosis or lid retraction  Neck: no thyromegaly or palpable thyroid nodules   Respiratory: lungs CTAB.  Cardiovascular: regular rhythm, normal S1 and S2, no audible murmurs  GI: soft, NT/ND, no masses/HSM appreciated.  Neurology: no tremors, DTR 2+  Skin: no visible rashes/lesions  Psychiatric: AAO x 3, normal affect/mood.  Ext: radial pulses intact, DP pulses intact, extremities warm, no cyanosis, clubbing or edema.       LABS:                        13.6   9.72  )-----------( 182      ( 2019 07:59 )             42.4         135  |  98  |  24<H>  ----------------------------<  147<H>  3.5   |  27  |  0.79    Ca    8.4      2019 07:59  Phos  3.4       Mg     1.8         TPro  6.5  /  Alb  3.5  /  TBili  0.7  /  DBili  x   /  AST  140<H>  /  ALT  58<H>  /  AlkPhos  51      PT/INR - ( 2019 01:11 )   PT: 11.5 sec;   INR: 1.02          PTT - ( 2019 12:22 )  PTT:37.2 sec  Urinalysis Basic - ( 2019 02:37 )    Color: Yellow / Appearance: Clear / SG: >=1.030 / pH: x  Gluc: x / Ketone: NEGATIVE  / Bili: Negative / Urobili: 0.2 E.U./dL   Blood: x / Protein: NEGATIVE mg/dL / Nitrite: POSITIVE   Leuk Esterase: Trace / RBC: < 5 /HPF / WBC 5-10 /HPF   Sq Epi: x / Non Sq Epi: 0-5 /HPF / Bacteria: Present /HPF        Thyroid Stimulating Hormone, Serum: 0.172 (23 @ 21:41)  T4, Serum: 4.39 ug/dL (11.24.19 @ 05:52)  	        CAPILLARY BLOOD GLUCOSE      POCT Blood Glucose.: 125 mg/dL (2019 15:51)      Echocardiogram:   EXAM:  ECHOCARDIOGRAM (CARDIOL)                          PROCEDURE DATE:  2019          INTERPRETATION:  REPORT:    -----------------------------------  TRANSTHORACIC ECHOCARDIOGRAM REPORT       ---------------------------------------------------------------------------  -----  Patient Name:   DIONI KAN Date of Exam:       2019  Medical Rec #:  [id number]             Height:             24.4 in  Account #:      [id number]             Weight:             280.0 lb  YOB: 1932           BSA:                1.12 m²  Patient Age:    87 years            BP:                 97/54 mmHg  Patient Gender: F                   Sonographer:        Sorin Araya                                      Refering Physician: ROBIN       CPT:           ECHO TTE WO CON COMP - 59222; - [id number]  Indication(s): I48.2 - Chronic atrial fibrillation     Study Quality: Study quality was good.       ---------------------------------------------------------------------------  -----  CONCLUSIONS:     1. Patient was in Afib with RVR during the study.   2. Mild symmetric left ventricular hypertrophy.   3. Tachycardia precludes segmental wall motion evaluation. Grossly,   there is mild global left ventricular systolic dysfunction.LVEF 50%.   4. A bioprosthetic valve is noted in the aortic position which appears   well-seated. The peak transvalvular velovity is 1.70 m/s, the mean   transvalvular gradient is 7.8 mmHg, and the LVOT/AV velocity ratio is   0.43 m/s. There is no aortic regurgitation.   5. Moderate pulmonary hypertension, PASP is 57.2 mmHg.   6. No pericardial effusion.   7. Left pleural effusion.    ---------------------------------------------------------------------------  -----  2D AND M-MODE MEASUREMENTS (normal ranges within parentheses):     Left Ventricle:          Normal  IVSd (2D):      1.20 cm (0.7-1.1)  LVPWd (2D):     1.11 cm (0.7-1.1) Right Ventricle:  LVIDd (2D):     3.50 cm (3.4-5.7) RVd (2D):        2.55 cm  LVIDs (2D):     3.01 cm       LV DIASTOLIC FUNCTION:     MV e' lat: 8.0 cm/s Decel Time: 130 msec  MV e' med: 5.6 cm/s  MV e' av.8 cm/s    SPECTRAL DOPPLER ANALYSIS:     Mitral Valve:  MV Max Eduardo:   MV P1/2 Time: 37.70 msec  MV Mean Grad: MV Area, PHT: 5.84 cm²       Aortic Valve: AoV Max Eduardo:    1.70 m/s AoV Peak P.6 mmHg AoV Mean   P.8 mmHg  LVOT Vmax:      0.73 m/s LVOT VTI:      0.133 m   LVOT Diameter: 2.16 cm  AoV Area, VMax: 1.58 cm² AoV Area, VTI: 1.54 cm²  AoV Area, Vmn:    Tricuspid Valve and PA/RV Systolic Pressure: TR Max Velocity: 3.25 m/s RA   Pressure: 15 mmHg RVSP/PASP: 57.2 mmHg       ---------------------------------------------------------------------------  -----  FINDINGS:     Left Ventricle:  There is mild symmetric left ventricular hypertrophy. Tachycardia   precludes segmental wall motion evaluation. Grossly, there is mild global   left ventricular systolic dysfunction. LVEF 50%.     Right Ventricle:  The right ventricle is normal in size and systolic function.     Left Atrium:  The left atrium is normal in size.     Right Atrium:  The right atrium is normal in size.     Aortic Valve:  A bioprosthetic valve is noted in the aortic position which appears   well-seated. The peak transvalvular velovity is 1.70 m/s, the mean   transvalvular gradient is 7.8 mmHg, and the LVOT/AV velocity ratio is   0.43 m/s. There is no aortic regurgitation.     Pulmonic Valve:  Structurally normal pulmonic valve with normal leaflet excursion. There   is trace pulmonic regurgitation.     Mitral Valve:  Structurally normal mitral valve with normal leaflet excursion. There is   severe mitral annular calcification. There is moderate mitral   regurgitation.     Tricuspid Valve:  Structurally normal tricuspid valve with normal leaflet excursion. There   is mild tricuspid regurgitation. Pulmonary artery systolic pressure   (estimated using the tricuspid regurgitant gradient and an estimate of   right atrial pressure) is 57.2 mmHg.     Aorta:  The aortic root is normal in size and structure. Aortic root is 3 cm.     Venous:  The inferior vena cava is normal in size (<2.1cm) with abnormal   inspiratory collapse (<50%) consistent with mildly elevated right atrial   pressure (  8, range 5-10mmHg).     Pericardium:  No pericardial effusion is seen. Left pleural effusion.     Dr. Greta Gasca MD.    Electronically signed by Dr. Greta Gasca MD.  Signature Date/Time: 2019/1:29:22 PM         *** Final ***                  "Thank you for the opportunity to participate in the care of this   patient."        GRETA GASCA M.D., ATTENDING CARDIOLOGIST  This document has been electronically signed. 2019 10:11AM               (- @ 10:11)

## 2019-11-25 NOTE — PROVIDER CONTACT NOTE (CHANGE IN STATUS NOTIFICATION) - ASSESSMENT
Pt appeared to be anxious and sob, satting 77-80% on 1L O2, SVT to 190s, skin pale, clammy and cool to touch. BP unable to obtain from cardiac monitor x3.

## 2019-11-26 DIAGNOSIS — I48.4 ATYPICAL ATRIAL FLUTTER: ICD-10-CM

## 2019-11-26 LAB
ANION GAP SERPL CALC-SCNC: 9 MMOL/L — SIGNIFICANT CHANGE UP (ref 5–17)
APTT BLD: 30.9 SEC — SIGNIFICANT CHANGE UP (ref 27.5–36.3)
APTT BLD: 41.6 SEC — HIGH (ref 27.5–36.3)
APTT BLD: 50.9 SEC — HIGH (ref 27.5–36.3)
APTT BLD: 62.4 SEC — HIGH (ref 27.5–36.3)
APTT BLD: 65.9 SEC — HIGH (ref 27.5–36.3)
BUN SERPL-MCNC: 17 MG/DL — SIGNIFICANT CHANGE UP (ref 7–23)
CALCIUM SERPL-MCNC: 8.2 MG/DL — LOW (ref 8.4–10.5)
CHLORIDE SERPL-SCNC: 101 MMOL/L — SIGNIFICANT CHANGE UP (ref 96–108)
CO2 SERPL-SCNC: 29 MMOL/L — SIGNIFICANT CHANGE UP (ref 22–31)
CREAT SERPL-MCNC: 0.73 MG/DL — SIGNIFICANT CHANGE UP (ref 0.5–1.3)
GLUCOSE BLDC GLUCOMTR-MCNC: 105 MG/DL — HIGH (ref 70–99)
GLUCOSE BLDC GLUCOMTR-MCNC: 122 MG/DL — HIGH (ref 70–99)
GLUCOSE BLDC GLUCOMTR-MCNC: 133 MG/DL — HIGH (ref 70–99)
GLUCOSE SERPL-MCNC: 125 MG/DL — HIGH (ref 70–99)
HBA1C BLD-MCNC: 5.3 % — SIGNIFICANT CHANGE UP (ref 4–5.6)
HCT VFR BLD CALC: 40.9 % — SIGNIFICANT CHANGE UP (ref 34.5–45)
HGB BLD-MCNC: 12.8 G/DL — SIGNIFICANT CHANGE UP (ref 11.5–15.5)
MAGNESIUM SERPL-MCNC: 2.2 MG/DL — SIGNIFICANT CHANGE UP (ref 1.6–2.6)
MCHC RBC-ENTMCNC: 31.3 GM/DL — LOW (ref 32–36)
MCHC RBC-ENTMCNC: 32.2 PG — SIGNIFICANT CHANGE UP (ref 27–34)
MCV RBC AUTO: 102.8 FL — HIGH (ref 80–100)
NRBC # BLD: 0 /100 WBCS — SIGNIFICANT CHANGE UP (ref 0–0)
PLATELET # BLD AUTO: 143 K/UL — LOW (ref 150–400)
POTASSIUM SERPL-MCNC: 4 MMOL/L — SIGNIFICANT CHANGE UP (ref 3.5–5.3)
POTASSIUM SERPL-SCNC: 4 MMOL/L — SIGNIFICANT CHANGE UP (ref 3.5–5.3)
RBC # BLD: 3.98 M/UL — SIGNIFICANT CHANGE UP (ref 3.8–5.2)
RBC # FLD: 13.5 % — SIGNIFICANT CHANGE UP (ref 10.3–14.5)
SODIUM SERPL-SCNC: 139 MMOL/L — SIGNIFICANT CHANGE UP (ref 135–145)
T4 FREE SERPL-MCNC: 0.71 NG/DL — SIGNIFICANT CHANGE UP (ref 0.7–1.48)
WBC # BLD: 7.16 K/UL — SIGNIFICANT CHANGE UP (ref 3.8–10.5)
WBC # FLD AUTO: 7.16 K/UL — SIGNIFICANT CHANGE UP (ref 3.8–10.5)

## 2019-11-26 PROCEDURE — 93010 ELECTROCARDIOGRAM REPORT: CPT | Mod: 77

## 2019-11-26 PROCEDURE — 99223 1ST HOSP IP/OBS HIGH 75: CPT

## 2019-11-26 PROCEDURE — 93010 ELECTROCARDIOGRAM REPORT: CPT

## 2019-11-26 RX ORDER — APIXABAN 2.5 MG/1
1 TABLET, FILM COATED ORAL
Qty: 60 | Refills: 3
Start: 2019-11-26 | End: 2020-03-24

## 2019-11-26 RX ORDER — AMIODARONE HYDROCHLORIDE 400 MG/1
400 TABLET ORAL
Refills: 0 | Status: DISCONTINUED | OUTPATIENT
Start: 2019-11-26 | End: 2019-11-27

## 2019-11-26 RX ORDER — HEPARIN SODIUM 5000 [USP'U]/ML
700 INJECTION INTRAVENOUS; SUBCUTANEOUS
Qty: 25000 | Refills: 0 | Status: DISCONTINUED | OUTPATIENT
Start: 2019-11-26 | End: 2019-11-26

## 2019-11-26 RX ORDER — HEPARIN SODIUM 5000 [USP'U]/ML
800 INJECTION INTRAVENOUS; SUBCUTANEOUS
Qty: 25000 | Refills: 0 | Status: DISCONTINUED | OUTPATIENT
Start: 2019-11-26 | End: 2019-11-27

## 2019-11-26 RX ADMIN — Medication 50 MILLIGRAM(S): at 03:46

## 2019-11-26 RX ADMIN — Medication 50 MILLIGRAM(S): at 19:39

## 2019-11-26 RX ADMIN — HEPARIN SODIUM 7 UNIT(S)/HR: 5000 INJECTION INTRAVENOUS; SUBCUTANEOUS at 01:42

## 2019-11-26 RX ADMIN — AMIODARONE HYDROCHLORIDE 33.33 MG/MIN: 400 TABLET ORAL at 06:38

## 2019-11-26 RX ADMIN — Medication 100 MILLIGRAM(S): at 21:47

## 2019-11-26 RX ADMIN — Medication 81 MILLIGRAM(S): at 12:22

## 2019-11-26 RX ADMIN — AMIODARONE HYDROCHLORIDE 400 MILLIGRAM(S): 400 TABLET ORAL at 15:54

## 2019-11-26 RX ADMIN — CEFTRIAXONE 1000 MILLIGRAM(S): 500 INJECTION, POWDER, FOR SOLUTION INTRAMUSCULAR; INTRAVENOUS at 19:39

## 2019-11-26 RX ADMIN — Medication 50 MILLIGRAM(S): at 12:22

## 2019-11-26 RX ADMIN — DONEPEZIL HYDROCHLORIDE 5 MILLIGRAM(S): 10 TABLET, FILM COATED ORAL at 21:47

## 2019-11-26 RX ADMIN — ATORVASTATIN CALCIUM 80 MILLIGRAM(S): 80 TABLET, FILM COATED ORAL at 21:47

## 2019-11-26 NOTE — PROGRESS NOTE ADULT - ASSESSMENT
Patient is an 88 y/o female w hx of aortic valve replacement in 2009, intestinal resection s/p ovarian cyst rupture presenting for evaluation of rapid heart rate. Presented in SVT to 200s, converted to AF RVR and then NSR on amiodarone drip.

## 2019-11-26 NOTE — PROGRESS NOTE ADULT - PROBLEM SELECTOR PLAN 3
Patient with elevated troponin I at [facility] while having chest pain, no ischemic changes seen on EKG. Likely in setting of arrhythmia, patient is now chest pain free with resolution of arrhythmia.   - Repeat troponin T, ensure downtrending. Repeated EKG on arrival with no ischemic changes.

## 2019-11-26 NOTE — PROGRESS NOTE ADULT - PROBLEM SELECTOR PLAN 9
1.       PCP Contacted on Admission: (Y/N) --> Name & Phone #:  2.       Date of Contact with PCP:  3.       PCP Contacted at Discharge: (Y/N)  4.       Summary of Handoff Given to PCP:  5.       Post-Discharge Appointment Date and Location: [facility]

## 2019-11-26 NOTE — CONSULT NOTE ADULT - SUBJECTIVE AND OBJECTIVE BOX
CHIEF COMPLAINT: Palpitations, chest pain    HISTORY OF PRESENT ILLNESS: 86 y/o female w hx of bioprosthetic AVR 12/2009, intestinal resection s/p ovarian cyst rupture who initially presented with symptomatic SVT. Patient states that she had two glasses champagne when she began experiencing lightheadedness and chest and abdominal discomfort with radiation down the arms. She presented to the ED where she was noted to have HR of 200 for which she received adenosine 6mg then 12mg without improvement. She was placed on amiodarone drip and the rhythm converted to AFib w/ -130 bpm. Patient's BP was stable and her symptoms improved with rate slowing. She had a second episode during this admission on 11/24 at approximately 23:45. The patient was noted to be in SVT at 214bpm. Her symptoms were similar. Rhythm again slowed with amiodarone bolus and the patient was kept on AMIO drip. On 11/25 the patient went for cath which showed nonobstructive disease. EP has been consulted for further recommendations.      PAST MEDICAL & SURGICAL HISTORY:  Osteoporosis  Tibial fracture  Aortic valve replaced  Aortic stenosis  Essential hypertension  Hypothyroid  H/O aortic valve replacement  S/P ORIF (open reduction internal fixation) fracture  S/P arthroscopic knee surgery  S/P cataract surgery    Allergies    codeine (Unknown)  Demerol HCl (Unknown)  indomethacin (Unknown)  morphine (Unknown)  Percodan (Unknown)  Tequin (Unknown)    MEDICATIONS:  aMIOdarone Infusion 1 mG/Min IV Continuous <Continuous>  metoprolol tartrate 50 milliGRAM(s) Oral every 8 hours  cefTRIAXone Injectable. 1000 milliGRAM(s) IV Push every 24 hours  acetaminophen   Tablet .. 650 milliGRAM(s) Oral every 4 hours PRN  donepezil 5 milliGRAM(s) Oral at bedtime  atorvastatin 80 milliGRAM(s) Oral at bedtime  glucagon  Injectable 1 milliGRAM(s) IntraMuscular once PRN  insulin lispro (HumaLOG) corrective regimen sliding scale   SubCutaneous Before meals and at bedtime  aspirin enteric coated 81 milliGRAM(s) Oral daily  dextrose 5%. 1000 milliLiter(s) IV Continuous <Continuous>  heparin  Infusion 700 Unit(s)/Hr IV Continuous <Continuous>  influenza   Vaccine 0.5 milliLiter(s) IntraMuscular once  phenazopyridine 100 milliGRAM(s) Oral every 8 hours    FAMILY HISTORY:  No pertinent family history in first degree relatives    SOCIAL HISTORY:    [X] Non-smoker  [ ] Smoker  [ ] Alcohol    REVIEW OF SYSTEMS:    CONSTITUTIONAL: No fever, weight loss, or fatigue  EYES: No eye pain, visual disturbances, or discharge  ENMT:  No difficulty hearing, tinnitus, vertigo; No sinus or throat pain  NECK: No pain or stiffness  BREASTS: No pain, masses, or nipple discharge  RESPIRATORY: No cough, wheezing, chills or hemoptysis; No Shortness of Breath  CARDIOVASCULAR: No chest pain, palpitations, dizziness, or leg swelling  GASTROINTESTINAL: No abdominal or epigastric pain. No nausea, vomiting, or hematemesis; No diarrhea or constipation. No melena or hematochezia.  GENITOURINARY: No dysuria, frequency, hematuria, or incontinence  NEUROLOGICAL: No headaches, memory loss, loss of strength, numbness, or tremors  SKIN: No itching, burning, rashes, or lesions   LYMPH Nodes: No enlarged glands  ENDOCRINE: No heat or cold intolerance; No hair loss  MUSCULOSKELETAL: No joint pain or swelling; No muscle, back, or extremity pain  PSYCHIATRIC: No depression, anxiety, mood swings, or difficulty sleeping  HEME/LYMPH: No easy bruising, or bleeding gums  ALLERY AND IMMUNOLOGIC: No hives or eczema	    [X] All others negative	  [ ] Unable to obtain    PHYSICAL EXAM:  T(C): 36.7 (11-26-19 @ 09:43), Max: 37.3 (11-25-19 @ 15:07)  HR: 94 (11-26-19 @ 09:50) (94 - 114)  BP: 123/68 (11-26-19 @ 08:25) (100/61 - 147/73)  RR: 32 (11-26-19 @ 09:50) (18 - 32)  SpO2: 100% (11-26-19 @ 08:25) (95% - 100%)    I&O's Summary    25 Nov 2019 07:01  -  26 Nov 2019 07:00  --------------------------------------------------------  IN: 870.9 mL / OUT: 600 mL / NET: 270.9 mL    26 Nov 2019 07:01  -  26 Nov 2019 12:28  --------------------------------------------------------  IN: 421.8 mL / OUT: 250 mL / NET: 171.8 mL    TELEMETRY: AFL 2:1 conduction, VR 92bpm 	    ECG:      Appearance: Normal	  HEENT:   Normal oral mucosa, PERRL, EOMI	  Cardiovascular: Normal S1 S2, No JVD, No murmurs, No edema  Respiratory: Lungs clear to auscultation	  Gastrointestinal:  Soft, Non-tender, + BS	  Neurologic: A&O x 3, Non-focal  Extremities: Normal range of motion, No clubbing, cyanosis or edema  Vascular: Peripheral pulses palpable 2+ bilaterally    	  LABS:	 	                          12.8   7.16  )-----------( 143      ( 26 Nov 2019 06:56 )             40.9     11-26    139  |  101  |  17  ----------------------------<  125<H>  4.0   |  29  |  0.73    Ca    8.2<L>      26 Nov 2019 06:56  Phos  3.4     11-25  Mg     2.2     11-26    TPro  6.5  /  Alb  3.5  /  TBili  0.7  /  DBili  x   /  AST  140<H>  /  ALT  58<H>  /  AlkPhos  51  11-25    Thyroid Stimulating Hormone, Serum (11.23.19 @ 21:41)    Thyroid Stimulating Hormone, Serum: 0.172 uIU/mL    T4, Serum (11.24.19 @ 05:52)    T4, Serum: 4.39 ug/dL CHIEF COMPLAINT: Palpitations, chest pain    HISTORY OF PRESENT ILLNESS: 88 y/o female w hx of bioprosthetic AVR 12/2009, intestinal resection s/p ovarian cyst rupture who initially presented with symptomatic SVT. Patient states that she had two glasses champagne when she began experiencing lightheadedness and chest and abdominal discomfort with radiation down the arms. She presented to the ED where she was noted to have HR of 200 for which she received adenosine 6mg then 12mg without improvement. She was placed on amiodarone drip and the rhythm converted to AFib w/ -130 bpm. Patient's BP was stable and her symptoms improved with rate slowing. She had a second episode during this admission on 11/24 at approximately 23:45. The patient was noted to be in SVT at 214bpm. Her symptoms were similar. Rhythm again slowed with amiodarone bolus and the patient was kept on AMIO drip. On 11/25 the patient went for cath which showed nonobstructive disease. EP has been consulted for further recommendations.      PAST MEDICAL & SURGICAL HISTORY:  Osteoporosis  Tibial fracture  Aortic valve replaced  Aortic stenosis  Essential hypertension  Hypothyroid  H/O aortic valve replacement  S/P ORIF (open reduction internal fixation) fracture  S/P arthroscopic knee surgery  S/P cataract surgery    Allergies    codeine (Unknown)  Demerol HCl (Unknown)  indomethacin (Unknown)  morphine (Unknown)  Percodan (Unknown)  Tequin (Unknown)    MEDICATIONS:  aMIOdarone Infusion 1 mG/Min IV Continuous <Continuous>  metoprolol tartrate 50 milliGRAM(s) Oral every 8 hours  cefTRIAXone Injectable. 1000 milliGRAM(s) IV Push every 24 hours  acetaminophen   Tablet .. 650 milliGRAM(s) Oral every 4 hours PRN  donepezil 5 milliGRAM(s) Oral at bedtime  atorvastatin 80 milliGRAM(s) Oral at bedtime  glucagon  Injectable 1 milliGRAM(s) IntraMuscular once PRN  insulin lispro (HumaLOG) corrective regimen sliding scale   SubCutaneous Before meals and at bedtime  aspirin enteric coated 81 milliGRAM(s) Oral daily  dextrose 5%. 1000 milliLiter(s) IV Continuous <Continuous>  heparin  Infusion 700 Unit(s)/Hr IV Continuous <Continuous>  influenza   Vaccine 0.5 milliLiter(s) IntraMuscular once  phenazopyridine 100 milliGRAM(s) Oral every 8 hours    FAMILY HISTORY:  No pertinent family history in first degree relatives    SOCIAL HISTORY:    [X] Non-smoker  [ ] Smoker  [ ] Alcohol    REVIEW OF SYSTEMS:    CONSTITUTIONAL: No fever, weight loss, or fatigue  EYES: No eye pain, visual disturbances, or discharge  ENMT:  No difficulty hearing, tinnitus, vertigo; No sinus or throat pain  NECK: No pain or stiffness  BREASTS: No pain, masses, or nipple discharge  RESPIRATORY: No cough, wheezing, chills or hemoptysis; No Shortness of Breath  CARDIOVASCULAR: No chest pain, palpitations, dizziness, or leg swelling  GASTROINTESTINAL: No abdominal or epigastric pain. No nausea, vomiting, or hematemesis; No diarrhea or constipation. No melena or hematochezia.  GENITOURINARY: No dysuria, frequency, hematuria, or incontinence  NEUROLOGICAL: No headaches, memory loss, loss of strength, numbness, or tremors  SKIN: No itching, burning, rashes, or lesions   LYMPH Nodes: No enlarged glands  ENDOCRINE: No heat or cold intolerance; No hair loss  MUSCULOSKELETAL: No joint pain or swelling; No muscle, back, or extremity pain  PSYCHIATRIC: No depression, anxiety, mood swings, or difficulty sleeping  HEME/LYMPH: No easy bruising, or bleeding gums  ALLERY AND IMMUNOLOGIC: No hives or eczema	    [X] All others negative	  [ ] Unable to obtain    PHYSICAL EXAM:  T(C): 36.7 (11-26-19 @ 09:43), Max: 37.3 (11-25-19 @ 15:07)  HR: 94 (11-26-19 @ 09:50) (94 - 114)  BP: 123/68 (11-26-19 @ 08:25) (100/61 - 147/73)  RR: 32 (11-26-19 @ 09:50) (18 - 32)  SpO2: 100% (11-26-19 @ 08:25) (95% - 100%)    I&O's Summary    25 Nov 2019 07:01  -  26 Nov 2019 07:00  --------------------------------------------------------  IN: 870.9 mL / OUT: 600 mL / NET: 270.9 mL    26 Nov 2019 07:01  -  26 Nov 2019 12:28  --------------------------------------------------------  IN: 421.8 mL / OUT: 250 mL / NET: 171.8 mL    TELEMETRY: AFL 2:1 conduction, VR 92bpm 	    ECG: AFL with variable conduction 2:1, VR 95bpm, QRS 92ms     Appearance: Normal	  HEENT:   Normal oral mucosa, PERRL, EOMI	  Cardiovascular: Normal S1 S2, No JVD, No murmurs, No edema  Respiratory: Lungs clear to auscultation	  Gastrointestinal:  Soft, Non-tender, + BS	  Neurologic: A&O x 3, Non-focal  Extremities: Normal range of motion, No clubbing, cyanosis or edema  Vascular: Peripheral pulses palpable 2+ bilaterally    	  LABS:	 	                          12.8   7.16  )-----------( 143      ( 26 Nov 2019 06:56 )             40.9     11-26    139  |  101  |  17  ----------------------------<  125<H>  4.0   |  29  |  0.73    Ca    8.2<L>      26 Nov 2019 06:56  Phos  3.4     11-25  Mg     2.2     11-26    TPro  6.5  /  Alb  3.5  /  TBili  0.7  /  DBili  x   /  AST  140<H>  /  ALT  58<H>  /  AlkPhos  51  11-25    Thyroid Stimulating Hormone, Serum (11.23.19 @ 21:41)    Thyroid Stimulating Hormone, Serum: 0.172 uIU/mL    T4, Serum (11.24.19 @ 05:52)    T4, Serum: 4.39 ug/dL    < from: Echocardiogram (11.25.19 @ 10:11) >   1. Patient was in Afib with RVR during the study.   2. Mild symmetric left ventricular hypertrophy.   3. Tachycardia precludes segmental wall motion evaluation. Grossly,   there is mild global left ventricular systolic dysfunction. LVEF 50%.   4. A bioprosthetic valve is noted in the aortic position which appears well-seated. The peak transvalvular velovity is 1.70 m/s, the mean   transvalvular gradient is 7.8 mmHg, and the LVOT/AV velocity ratio is 0.43 m/s. There is no aortic regurgitation.   5. Moderate pulmonary hypertension, PASP is 57.2 mmHg.   6. No pericardial effusion.   7. Left pleural effusion.

## 2019-11-26 NOTE — CONSULT NOTE ADULT - ATTENDING COMMENTS
87yFemale with vague hx of DM, hypothyroidism presenting for evaluation of SVT with finding of low but not suppressed TSH  - check free T4, total T3, TPO Ab, Tg Ab for further workup  - check HbA1C, lipid profile, consistent carb diet    Pt is advised to follow up with me at discharge.     Halima Zapien MD, PhD  Endocrinology  [address]  [place] [address]  [phone] Tel  [phone] Fax  [email or web address]
Rapid atrial fibrillation organized to flutter then flutter 2:1. Agree with amio followed by FABY cardioversion.

## 2019-11-26 NOTE — PROGRESS NOTE ADULT - PROBLEM SELECTOR PLAN 7
Stable, will assess on echocardiogram tomorrow.    #HTN  -Home Meds: HCTZ-triamterene  -hold home meds  #HLD  -Lipitor 80mg

## 2019-11-26 NOTE — PHYSICAL THERAPY INITIAL EVALUATION ADULT - GENERAL OBSERVATIONS, REHAB EVAL
Patient received semi-supine in bed in no acute distress, +IV, O2, EKG Patient received semi-supine in bed in no acute distress, +IV, O2, EKG.

## 2019-11-26 NOTE — CONSULT NOTE ADULT - PROBLEM SELECTOR RECOMMENDATION 9
Pt in atypical flutter with variable (mostly 2:1) conduction at present. Strips from rapid response c/w flutter with 1:1 conduction at 214bpm. Rhythm degenerates to AFib and slows with amio. Amio gtt is successfully rate controlling patient at present. Would recommend rhythm controlling patient with DCCV tomorrow.   - FABY/DCCV tomorrow. The procedure, along with associated risks, including but not limited to stroke were discussed. All questions answered and informed consent signed.   - Recommend changing amio gtt to oral 400mg BID while admitted, then send home on 400mg QD.   - F/u with Dr. Herrera in 4-6 weeks in the office.

## 2019-11-26 NOTE — CONSULT NOTE ADULT - ASSESSMENT
86 y/o female w hx of bioprosthetic AVR 12/2009, intestinal resection s/p ovarian cyst rupture who initially presented with symptomatic SVT. Patient states that she had two glasses champagne when she began experiencing lightheadedness and chest and abdominal discomfort with radiation down the arms. She presented to the ED where she was noted to have HR of 200 for which she received adenosine 6mg then 12mg without improvement. She was placed on amiodarone drip and the rhythm converted to AFib w/ -130 bpm. Patient's BP was stable and her symptoms improved with rate slowing. She had a second episode during this admission on 11/24 at approximately 23:45. The patient was noted to be in SVT at 214bpm. Her symptoms were similar. Rhythm again slowed with amiodarone bolus and the patient was kept on AMIO drip. On 11/25 the patient went for cath which showed nonobstructive disease. EP has been consulted for further recommendations.

## 2019-11-26 NOTE — PHYSICAL THERAPY INITIAL EVALUATION ADULT - PERTINENT HX OF CURRENT PROBLEM, REHAB EVAL
88 y/o female w hx of aortic valve replacement in 2009, intestinal resection s/p ovarian cyst rupture presenting for evaluation of rapid heart rate. Presented in SVT to 200s, converted to AF RVR and then NSR on amiodarone drip.

## 2019-11-26 NOTE — PHYSICAL THERAPY INITIAL EVALUATION ADULT - CRITERIA FOR SKILLED THERAPEUTIC INTERVENTIONS
impairments found/functional limitations in following categories/rehab potential/therapy frequency/anticipated discharge recommendation

## 2019-11-26 NOTE — PROGRESS NOTE ADULT - PROBLEM SELECTOR PLAN 1
Patient presented with narrow complex tachycardia of 200bpm at [facility], no response to adenosine, responded to amiodarone drip, briefly in AF RVR as below, now in NSR on most recent EKG and telemetry on arrival.  - Continue telemetry  - Echocardiogram structural heart disease = 50% EF  - Continue amiodarone drip which converted patient in [facility].   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  - Lopressor 50mg q8 - switch to Toprol 200 qd  - Awaiting EP recommendations

## 2019-11-26 NOTE — PROGRESS NOTE ADULT - PROBLEM SELECTOR PLAN 6
Cr of 1.32 on baseline of 0.7.   - Ordered urine studies to assess FeNa/FeUrea (on diuretics)  - May be 2/2 dehydration from diuretic abuse    #UTI  -Ceftriaxone 1g for 3 days  -Pyridium

## 2019-11-26 NOTE — PROGRESS NOTE ADULT - PROBLEM SELECTOR PLAN 2
Patient briefly converted to AF RVR while undergoing treatment for SVT.   - CHADSVASC of 3, on heparin drip at present. Trend PTT.  - Rate improving on amio drip  - NOW IN SINUS
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
## 2019-11-26 NOTE — PHYSICAL THERAPY INITIAL EVALUATION ADULT - ADDITIONAL COMMENTS
Patient lives in building with 2 steps to enter with handrail, and has sunken lobby. Patient independent without devices and no DME

## 2019-11-27 ENCOUNTER — TRANSCRIPTION ENCOUNTER (OUTPATIENT)
Age: 84
End: 2019-11-27

## 2019-11-27 ENCOUNTER — INBOUND DOCUMENT (OUTPATIENT)
Age: 84
End: 2019-11-27

## 2019-11-27 VITALS — TEMPERATURE: 98 F

## 2019-11-27 LAB
ANION GAP SERPL CALC-SCNC: 8 MMOL/L — SIGNIFICANT CHANGE UP (ref 5–17)
APTT BLD: 73.2 SEC — HIGH (ref 27.5–36.3)
BUN SERPL-MCNC: 15 MG/DL — SIGNIFICANT CHANGE UP (ref 7–23)
CALCIUM SERPL-MCNC: 8.1 MG/DL — LOW (ref 8.4–10.5)
CHLORIDE SERPL-SCNC: 103 MMOL/L — SIGNIFICANT CHANGE UP (ref 96–108)
CO2 SERPL-SCNC: 31 MMOL/L — SIGNIFICANT CHANGE UP (ref 22–31)
CREAT SERPL-MCNC: 0.7 MG/DL — SIGNIFICANT CHANGE UP (ref 0.5–1.3)
GLUCOSE BLDC GLUCOMTR-MCNC: 148 MG/DL — HIGH (ref 70–99)
GLUCOSE SERPL-MCNC: 110 MG/DL — HIGH (ref 70–99)
HCT VFR BLD CALC: 36.3 % — SIGNIFICANT CHANGE UP (ref 34.5–45)
HGB BLD-MCNC: 11.8 G/DL — SIGNIFICANT CHANGE UP (ref 11.5–15.5)
INR BLD: 1.14 — SIGNIFICANT CHANGE UP (ref 0.88–1.16)
MAGNESIUM SERPL-MCNC: 2 MG/DL — SIGNIFICANT CHANGE UP (ref 1.6–2.6)
MCHC RBC-ENTMCNC: 32.5 GM/DL — SIGNIFICANT CHANGE UP (ref 32–36)
MCHC RBC-ENTMCNC: 33.3 PG — SIGNIFICANT CHANGE UP (ref 27–34)
MCV RBC AUTO: 102.5 FL — HIGH (ref 80–100)
NRBC # BLD: 0 /100 WBCS — SIGNIFICANT CHANGE UP (ref 0–0)
PLATELET # BLD AUTO: 142 K/UL — LOW (ref 150–400)
POTASSIUM SERPL-MCNC: 3.2 MMOL/L — LOW (ref 3.5–5.3)
POTASSIUM SERPL-SCNC: 3.2 MMOL/L — LOW (ref 3.5–5.3)
PROTHROM AB SERPL-ACNC: 12.9 SEC — SIGNIFICANT CHANGE UP (ref 10–12.9)
RBC # BLD: 3.54 M/UL — LOW (ref 3.8–5.2)
RBC # FLD: 13.4 % — SIGNIFICANT CHANGE UP (ref 10.3–14.5)
SODIUM SERPL-SCNC: 142 MMOL/L — SIGNIFICANT CHANGE UP (ref 135–145)
T3 SERPL-MCNC: 52 NG/DL — LOW (ref 80–200)
THYROPEROXIDASE AB SERPL-ACNC: 12.4 IU/ML — SIGNIFICANT CHANGE UP
WBC # BLD: 6.66 K/UL — SIGNIFICANT CHANGE UP (ref 3.8–10.5)
WBC # FLD AUTO: 6.66 K/UL — SIGNIFICANT CHANGE UP (ref 3.8–10.5)

## 2019-11-27 PROCEDURE — 99232 SBSQ HOSP IP/OBS MODERATE 35: CPT

## 2019-11-27 PROCEDURE — 92960 CARDIOVERSION ELECTRIC EXT: CPT

## 2019-11-27 PROCEDURE — 93312 ECHO TRANSESOPHAGEAL: CPT | Mod: 26

## 2019-11-27 RX ORDER — LIOTHYRONINE SODIUM 25 UG/1
1 TABLET ORAL
Qty: 0 | Refills: 0 | DISCHARGE

## 2019-11-27 RX ORDER — POTASSIUM CHLORIDE 20 MEQ
40 PACKET (EA) ORAL ONCE
Refills: 0 | Status: COMPLETED | OUTPATIENT
Start: 2019-11-27 | End: 2019-11-27

## 2019-11-27 RX ORDER — DONEPEZIL HYDROCHLORIDE 10 MG/1
1 TABLET, FILM COATED ORAL
Qty: 30 | Refills: 0
Start: 2019-11-27 | End: 2019-12-26

## 2019-11-27 RX ORDER — TRIAMTERENE/HYDROCHLOROTHIAZID 75 MG-50MG
1 TABLET ORAL
Qty: 0 | Refills: 0 | DISCHARGE

## 2019-11-27 RX ORDER — AMIODARONE HYDROCHLORIDE 400 MG/1
1 TABLET ORAL
Qty: 30 | Refills: 0
Start: 2019-11-27 | End: 2019-12-26

## 2019-11-27 RX ORDER — APIXABAN 2.5 MG/1
1 TABLET, FILM COATED ORAL
Qty: 60 | Refills: 0
Start: 2019-11-27 | End: 2019-12-26

## 2019-11-27 RX ORDER — METOPROLOL TARTRATE 50 MG
1 TABLET ORAL
Qty: 90 | Refills: 0
Start: 2019-11-27 | End: 2019-12-26

## 2019-11-27 RX ORDER — LEVOTHYROXINE SODIUM 125 MCG
1 TABLET ORAL
Qty: 0 | Refills: 0 | DISCHARGE

## 2019-11-27 RX ORDER — ATORVASTATIN CALCIUM 80 MG/1
1 TABLET, FILM COATED ORAL
Qty: 30 | Refills: 0
Start: 2019-11-27 | End: 2019-12-26

## 2019-11-27 RX ORDER — ASPIRIN/CALCIUM CARB/MAGNESIUM 324 MG
1 TABLET ORAL
Qty: 30 | Refills: 0
Start: 2019-11-27 | End: 2019-12-26

## 2019-11-27 RX ORDER — AMIODARONE HYDROCHLORIDE 400 MG/1
1 TABLET ORAL
Qty: 30 | Refills: 1
Start: 2019-11-27 | End: 2020-01-25

## 2019-11-27 RX ORDER — APIXABAN 2.5 MG/1
1 TABLET, FILM COATED ORAL
Qty: 60 | Refills: 2
Start: 2019-11-27 | End: 2020-02-24

## 2019-11-27 RX ORDER — LANOLIN ALCOHOL/MO/W.PET/CERES
5 CREAM (GRAM) TOPICAL AT BEDTIME
Refills: 0 | Status: DISCONTINUED | OUTPATIENT
Start: 2019-11-27 | End: 2019-11-27

## 2019-11-27 RX ADMIN — AMIODARONE HYDROCHLORIDE 400 MILLIGRAM(S): 400 TABLET ORAL at 06:44

## 2019-11-27 RX ADMIN — Medication 40 MILLIEQUIVALENT(S): at 17:36

## 2019-11-27 RX ADMIN — Medication 5 MILLIGRAM(S): at 01:45

## 2019-11-27 RX ADMIN — Medication 81 MILLIGRAM(S): at 14:44

## 2019-11-27 RX ADMIN — Medication 50 MILLIGRAM(S): at 06:44

## 2019-11-27 RX ADMIN — Medication 100 MILLIGRAM(S): at 06:44

## 2019-11-27 RX ADMIN — Medication 40 MILLIEQUIVALENT(S): at 14:44

## 2019-11-27 RX ADMIN — Medication 100 MILLIGRAM(S): at 14:44

## 2019-11-27 RX ADMIN — AMIODARONE HYDROCHLORIDE 400 MILLIGRAM(S): 400 TABLET ORAL at 17:36

## 2019-11-27 NOTE — DISCHARGE NOTE NURSING/CASE MANAGEMENT/SOCIAL WORK - NSDCFUADDAPPT_GEN_ALL_CORE_FT
Please follow-up with Dr. Rosado (cardiology) in the next two weeks for medication refills and optimization. We tried to call his office to schedule an appointment, but they are closed until Monday. Please call [phone] to be seen by Dr. Rosado in the next two weeks.     Please follow-up with Dr. Herrera (Cardiac Electrophysiology) on January 8th at 9:15 AM.

## 2019-11-27 NOTE — PROGRESS NOTE ADULT - ATTENDING COMMENTS
87yFemale with vague hx of hypothryoidism, now with continued abnormal TFTs, clinically euthyroid.   - follow up Tg antibodies.    -continue to hold synthroid and cytomel.  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   - no e/o diabetes, continue to hold metformin.     Pt is advised to follow up with me at discharge.     Halima Zapien MD, PhD  Endocrinology  [address] [place] [address] [address]  [place], [place] [address]  [phone] Tel  [phone] Fax  [email or web address]

## 2019-11-27 NOTE — DISCHARGE NOTE PROVIDER - NSDCCPTREATMENT_GEN_ALL_CORE_FT
PRINCIPAL PROCEDURE  Procedure: Left heart cardiac catheterization  Findings and Treatment: nonobstructive CAD

## 2019-11-27 NOTE — DISCHARGE NOTE NURSING/CASE MANAGEMENT/SOCIAL WORK - PATIENT PORTAL LINK FT
You can access the FollowMyHealth Patient Portal offered by [facility] by registering at the following website: [email or web address]. By joining [facility]’s FollowMyHealth portal, you will also be able to view your health information using other applications (apps) compatible with our system.

## 2019-11-27 NOTE — DIETITIAN INITIAL EVALUATION ADULT. - ENERGY NEEDS
IBW used to calculate energy needs due to pt's current body weight exceeding 120% of IBW  Needs and calculated for age and weight maintenance.

## 2019-11-27 NOTE — DISCHARGE NOTE PROVIDER - NSDCFUADDINST_GEN_ALL_CORE_FT
Please take the amiodarone 400mg once a day.  Please take the eliquis 2.5mg twice a day.  Please take the lopressor 50mg once a day

## 2019-11-27 NOTE — DISCHARGE NOTE NURSING/CASE MANAGEMENT/SOCIAL WORK - NSDCPEELIQUIS_GEN_ALL_CORE
Apixaban/Eliquis - Dietary Advice/Apixaban/Eliquis - Potential for adverse drug reactions and interactions/Apixaban/Eliquis - Compliance/Apixaban/Eliquis - Follow up monitoring

## 2019-11-27 NOTE — DIETITIAN INITIAL EVALUATION ADULT. - PROBLEM SELECTOR PLAN 8
1.       PCP Contacted on Admission: (Y/N) --> Name & Phone #:  2.       Date of Contact with PCP:  3.       PCP Contacted at Discharge: (Y/N)  4.       Summary of Handoff Given to PCP:  5.       Post-Discharge Appointment Date and Location: [facility]

## 2019-11-27 NOTE — PROGRESS NOTE ADULT - SUBJECTIVE AND OBJECTIVE BOX
INTERVAL HPI/OVERNIGHT EVENTS:    Patient is a 87y old  Female who presents with a chief complaint of SVT, AF RVR (27 Nov 2019 12:52)  no acute overnight events  s/p cardioversion.  feeling well overall.   denies nausea, vomiting, abdominal pain, SOB, chest pain palpitations, urinary sx, headache, dizziness, subjective fever, chills    Pt reports the following symptoms:    CONSTITUTIONAL:  Negative fever or chills, feels well, good appetite  EYES:  Negative  blurry vision or double vision  CARDIOVASCULAR:  Negative for chest pain or palpitations  RESPIRATORY:  Negative for cough, wheezing, or SOB   GASTROINTESTINAL:  Negative for nausea, vomiting, diarrhea, constipation, or abdominal pain  GENITOURINARY:  Negative frequency, urgency or dysuria  NEUROLOGIC:  No headache, confusion, dizziness, lightheadedness    MEDICATIONS  (STANDING):  aMIOdarone    Tablet 400 milliGRAM(s) Oral two times a day  aspirin enteric coated 81 milliGRAM(s) Oral daily  atorvastatin 80 milliGRAM(s) Oral at bedtime  dextrose 5%. 1000 milliLiter(s) (50 mL/Hr) IV Continuous <Continuous>  dextrose 50% Injectable 12.5 Gram(s) IV Push once  dextrose 50% Injectable 25 Gram(s) IV Push once  dextrose 50% Injectable 25 Gram(s) IV Push once  donepezil 5 milliGRAM(s) Oral at bedtime  heparin  Infusion 800 Unit(s)/Hr (8 mL/Hr) IV Continuous <Continuous>  insulin lispro (HumaLOG) corrective regimen sliding scale   SubCutaneous Before meals and at bedtime  melatonin 5 milliGRAM(s) Oral at bedtime  metoprolol tartrate 50 milliGRAM(s) Oral every 8 hours  phenazopyridine 100 milliGRAM(s) Oral every 8 hours    MEDICATIONS  (PRN):  acetaminophen   Tablet .. 650 milliGRAM(s) Oral every 4 hours PRN Temp greater or equal to 38C (100.4F), Mild Pain (1 - 3)  dextrose 40% Gel 15 Gram(s) Oral once PRN Blood Glucose LESS THAN 70 milliGRAM(s)/deciliter  glucagon  Injectable 1 milliGRAM(s) IntraMuscular once PRN Glucose LESS THAN 70 milligrams/deciliter      Past medical history reviewed  Family history reviewed  Social history reviewed    PHYSICAL EXAM  Vital Signs Last 24 Hrs  T(C): 36.4 (27 Nov 2019 16:58), Max: 36.8 (27 Nov 2019 14:42)  T(F): 97.6 (27 Nov 2019 16:58), Max: 98.3 (27 Nov 2019 14:42)  HR: 54 (27 Nov 2019 14:14) (54 - 100)  BP: 101/43 (27 Nov 2019 14:14) (101/43 - 118/58)  BP(mean): 64 (27 Nov 2019 14:14) (64 - 85)  RR: 18 (27 Nov 2019 14:14) (14 - 37)  SpO2: 93% (27 Nov 2019 14:14) (90% - 99%)    Constitutional: wn/wd in NAD.   HEENT: NCAT, MMM, OP clear, EOMI, no proptosis or lid retraction  Neck: no thyromegaly or palpable thyroid nodules   Respiratory: lungs CTAB.  Cardiovascular: regular rhythm, normal S1 and S2, no audible murmurs, no peripheral edema  GI: soft, NT/ND, no masses/HSM appreciated.  Neurology: no tremors, DTR 2+  Skin: no visible rashes/lesions  Psychiatric: AAO x 3, normal affect/mood.    LABS:                        11.8   6.66  )-----------( 142      ( 27 Nov 2019 07:11 )             36.3     11-27    142  |  103  |  15  ----------------------------<  110<H>  3.2<L>   |  31  |  0.70    Ca    8.1<L>      27 Nov 2019 07:11  Mg     2.0     11-27      PT/INR - ( 27 Nov 2019 07:11 )   PT: 12.9 sec;   INR: 1.14          PTT - ( 27 Nov 2019 07:11 )  PTT:73.2 sec    Thyroid Stimulating Hormone, Serum: 0.172 uIU/mL (11-23 @ 21:41)  Free Thyroxine, Serum: 0.71 ng/dL (11.26.19 @ 18:29)    Triiodothyronine, Total (T3 Total): 52 ng/dL (11.27.19 @ 02:19)    Thyroperoxidase Antibody: 12.4 IU/mL (11.27.19 @ 02:20)        HbA1C: 5.3 % (11-26 @ 06:56)    CAPILLARY BLOOD GLUCOSE      POCT Blood Glucose.: 148 mg/dL (27 Nov 2019 17:03)  POCT Blood Glucose.: 105 mg/dL (26 Nov 2019 21:56)

## 2019-11-27 NOTE — DISCHARGE NOTE PROVIDER - CARE PROVIDERS DIRECT ADDRESSES
[email or web address]

## 2019-11-27 NOTE — DISCHARGE NOTE PROVIDER - NSDCMRMEDTOKEN_GEN_ALL_CORE_FT
levothyroxine 50 mcg (0.05 mg) oral tablet: 1 tab(s) orally once a day  liothyronine 5 mcg oral tablet: 1 tab(s) orally once a day  LORazepam 1 mg oral tablet: 1 dose(s) orally , As Needed  metFORMIN 500 mg oral tablet: 1 tab(s) orally 2 times a day amiodarone 400 mg oral tablet: 1 tab(s) orally once a day   aspirin 81 mg oral delayed release tablet: 1 tab(s) orally once a day MDD:1 tab  atorvastatin 80 mg oral tablet: 1 tab(s) orally once a day (at bedtime) MDD:1 tab  donepezil 5 mg oral tablet: 1 tab(s) orally once a day (at bedtime) MDD:1 tab  Eliquis 2.5 mg oral tablet: 1 tab(s) orally 2 times a day   LORazepam 1 mg oral tablet: 1 dose(s) orally , As Needed  metFORMIN 500 mg oral tablet: 1 tab(s) orally 2 times a day  metoprolol tartrate 50 mg oral tablet: 1 tab(s) orally every 8 hours  rolling walker: Apply topically to affected area once a day MDD:1    one rolling walker for gait instability   (ICD10 R26.9)

## 2019-11-27 NOTE — DISCHARGE NOTE PROVIDER - CARE PROVIDER_API CALL
Winnie Rosado)  Cardiovascular Disease; Internal Medicine  [address] [id number]  Phone: [phone]  Fax: [phone]  Follow Up Time: Winnie Rosado)  Cardiovascular Disease; Internal Medicine  [address] [id number]  Phone: [phone]  Fax: [phone]  Follow Up Time: 2 weeks    Shan Herrera)  Cardiac Electrophysiology; Cardiology; Cardiovascular Disease  [address], [address] [place] [address]  Phone: [phone]  Fax: [phone]  Follow Up Time: 1 month

## 2019-11-27 NOTE — DISCHARGE NOTE PROVIDER - HOSPITAL COURSE
Pt is an 87F with a history of AVR (2009), hernia repair, ovarian cyst rupture who presented to [facility] for palpitations on 11/24/19. She described discomfort in her chest and abdomen with the palpitations, while attending a party. In the ED, she was tachycardic to ~200. Although EKG was nonischemic, she did have a trop T of 0.88 (which uptrended slightly then peaked). She was in SVT, for which she received adenosine 6mg followed by 12mg without improvement. She was started on an amiodarone drip. She converted to AF RVR with HR in the 110s-130s. She was started on lopressor PO. Course was complicated by flash pulmonary edema from rapid AF requiring diuresis with lasix as well as a nitro drip. She intermittently had recurrent episodes of symptomatic SVT requiring adenosine (unsuccessful) and additional IVP amiodarone. Subsequent AF RVR required one-time digoxin, however pt was finally controlled with amiodarone and lopressor. ECHO revealed EF 50%, mild global systolic dysfunction and no significant valvular dz. She underwent diagnostic cardiac cath which showed nonobstructive CAD.  EP took her for a FABY/CV ***. Endocrine was consulted for abnormal TFTs (TSH 0.1, fT4 WNL, total T3 slightly low) which was attributed to likely euthyroid sick syndrome. She was started on a heparin drip for AF which was switched to  *** at discharge. She was treated for a UTI with 3 days of rocephin. Pt is an 87F with a history of AVR (2009), hernia repair, ovarian cyst rupture who presented to [facility] for palpitations on 11/24/19. She described discomfort in her chest and abdomen with the palpitations and was found to be  in SVT, for which she received adenosine without improvement. She was started on an amiodarone drip. She converted to AF RVR with HR in the 110s-130s. She was started on lopressor PO. Course was complicated by flash pulmonary edema from rapid AF requiring diuresis with lasix as well as a nitro drip. She intermittently had recurrent episodes of symptomatic SVT requiring adenosine (unsuccessful) and additional IVP amiodarone. Subsequent AF RVR required one-time digoxin, however pt was finally controlled with amiodarone and lopressor. ECHO revealed EF 50%, mild global systolic dysfunction and no significant valvular dz. She underwent diagnostic cardiac cath which showed nonobstructive CAD.  EP took her for a FABY/CV ***. Endocrine was consulted for abnormal TFTs (TSH 0.1, fT4 WNL, total T3 slightly low) which was attributed to likely euthyroid sick syndrome. She was started on a heparin drip for AF which was switched to  *** at discharge. She was treated for a UTI with 3 days of rocephin. Pt is an 87F with a history of AVR (2009), hernia repair, ovarian cyst rupture who presented to [facility] for palpitations on 11/24/19. She described discomfort in her chest and abdomen with the palpitations and was found to be  in SVT, for which she received adenosine without improvement. She was started on an amiodarone drip. She converted to AF RVR with HR in the 110s-130s. She was started on lopressor PO. Course was complicated by flash pulmonary edema from rapid AF requiring diuresis with lasix as well as a nitro drip. She intermittently had recurrent episodes of symptomatic SVT requiring adenosine (unsuccessful) and additional IVP amiodarone. Subsequent AF RVR required one-time digoxin, however pt was finally controlled with amiodarone and lopressor. ECHO revealed EF 50%, mild global systolic dysfunction and no significant valvular dz. She underwent diagnostic cardiac cath which showed nonobstructive CAD.  EP took her for a FABY/CV and no cardioversion occurred. as patient was in NSR Endocrine was consulted for abnormal TFTs (TSH 0.1, fT4 WNL, total T3 slightly low) which was attributed to likely euthyroid sick syndrome. She was started on a heparin drip for AF which was switched to Eliquis at discharge. She was treated for a UTI with 3 days of rocephin.

## 2019-11-27 NOTE — DISCHARGE NOTE PROVIDER - NSDCFUADDAPPT_GEN_ALL_CORE_FT
Please follow-up with Dr. Rosado (cardiology) in the next two weeks for medication refills and optimization. We tried to call his office to schedule an appointment, but they are closed until Monday. Please call [phone] to be seen by Dr. Rosado in the next two weeks.     Please follow-up with Dr. Herrera (Cardiac Electrophysiology) on January 8th at 9:15 AM.

## 2019-11-27 NOTE — DISCHARGE NOTE PROVIDER - PROVIDER TOKENS
PROVIDER:[TOKEN:[[id number]]] PROVIDER:[TOKEN:[[id number]],FOLLOWUP:[2 weeks]],PROVIDER:[TOKEN:[[id number]],FOLLOWUP:[1 month]]

## 2019-11-27 NOTE — DIETITIAN INITIAL EVALUATION ADULT. - PROBLEM SELECTOR PLAN 3
Patient with elevated troponin I at [facility] while having chest pain, no ischemic changes seen on EKG. Likely in setting of arrhythmia, patient is now chest pain free with resolution of arrhythmia.   - Repeat troponin T, ensure downtrending. Repeated EKG on arrival with no ischemic changes.   - Bedside echo in AM to assess for regional wall motion abnormalities.

## 2019-11-27 NOTE — DISCHARGE NOTE PROVIDER - NSDCCPCAREPLAN_GEN_ALL_CORE_FT
PRINCIPAL DISCHARGE DIAGNOSIS  Diagnosis: SVT (supraventricular tachycardia)  Assessment and Plan of Treatment: continue taking your antiarrythmic medications as directed. please follow up with cardiology outpatient. Seek medical attention if you develop any new symptoms including palpitations, chest pain, shortness of breath or lightheadedness.      SECONDARY DISCHARGE DIAGNOSES  Diagnosis: Atrial fibrillation with rapid ventricular response  Assessment and Plan of Treatment: continue taking your antiarrythmic medications as directed. please follow up with cardiology outpatient. Seek medical attention if you develop any new symptoms including palpitations, chest pain, shortness of breath or lightheadedness.    Diagnosis: H/O aortic valve replacement  Assessment and Plan of Treatment: Follow up with your cardiologist.

## 2019-11-27 NOTE — DIETITIAN INITIAL EVALUATION ADULT. - OTHER INFO
Pt with past medical history significant for DM2 and HTN.  Pt presents for evaluation of palpitations; found in SVT to 200s.  Now in NSR on amiodarone drip.  Pt reports following a Regular diet with good appetite/intake at baseline.  Patient has been taking HCTZ-triamterene 37.5/25 as well as levothyroxine 50mcg and liothyronine 5mcg to lose weight.  Pt reports she was recommended these medications by her endocrinologist.  Question accuracy.  Per team, likely etiology of current arrhythmias; advised to discontinue use.  Pt has been relatively weight-stable as of late.  Pt is currently NPO after midnight for FABY and cardioversion today.  Pt with no complaints of n/v/d/c or pain.  Skin: intact.

## 2019-11-29 LAB
CULTURE RESULTS: SIGNIFICANT CHANGE UP
GLUCOSE BLDC GLUCOMTR-MCNC: 123 MG/DL — HIGH (ref 70–99)
SPECIMEN SOURCE: SIGNIFICANT CHANGE UP

## 2019-12-02 DIAGNOSIS — Z95.2 PRESENCE OF PROSTHETIC HEART VALVE: ICD-10-CM

## 2019-12-02 DIAGNOSIS — N39.0 URINARY TRACT INFECTION, SITE NOT SPECIFIED: ICD-10-CM

## 2019-12-02 DIAGNOSIS — I10 ESSENTIAL (PRIMARY) HYPERTENSION: ICD-10-CM

## 2019-12-02 DIAGNOSIS — Z88.8 ALLERGY STATUS TO OTHER DRUGS, MEDICAMENTS AND BIOLOGICAL SUBSTANCES STATUS: ICD-10-CM

## 2019-12-02 DIAGNOSIS — I48.19 OTHER PERSISTENT ATRIAL FIBRILLATION: ICD-10-CM

## 2019-12-02 DIAGNOSIS — E86.0 DEHYDRATION: ICD-10-CM

## 2019-12-02 DIAGNOSIS — I35.0 NONRHEUMATIC AORTIC (VALVE) STENOSIS: ICD-10-CM

## 2019-12-02 DIAGNOSIS — I16.0 HYPERTENSIVE URGENCY: ICD-10-CM

## 2019-12-02 DIAGNOSIS — T50.2X5A ADVERSE EFFECT OF CARBONIC-ANHYDRASE INHIBITORS, BENZOTHIADIAZIDES AND OTHER DIURETICS, INITIAL ENCOUNTER: ICD-10-CM

## 2019-12-02 DIAGNOSIS — I47.1 SUPRAVENTRICULAR TACHYCARDIA: ICD-10-CM
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
## 2019-12-02 DIAGNOSIS — N17.9 ACUTE KIDNEY FAILURE, UNSPECIFIED: ICD-10-CM

## 2019-12-02 DIAGNOSIS — J81.0 ACUTE PULMONARY EDEMA: ICD-10-CM

## 2019-12-02 DIAGNOSIS — E03.9 HYPOTHYROIDISM, UNSPECIFIED: ICD-10-CM

## 2019-12-02 DIAGNOSIS — J96.01 ACUTE RESPIRATORY FAILURE WITH HYPOXIA: ICD-10-CM

## 2019-12-02 DIAGNOSIS — Z88.5 ALLERGY STATUS TO NARCOTIC AGENT: ICD-10-CM

## 2020-01-08 ENCOUNTER — APPOINTMENT (OUTPATIENT)
Dept: HEART AND VASCULAR | Facility: CLINIC | Age: 85
End: 2020-01-08

## 2020-01-08 PROCEDURE — 96375 TX/PRO/DX INJ NEW DRUG ADDON: CPT

## 2020-01-08 PROCEDURE — 83880 ASSAY OF NATRIURETIC PEPTIDE: CPT

## 2020-01-08 PROCEDURE — 83605 ASSAY OF LACTIC ACID: CPT

## 2020-01-08 PROCEDURE — 86850 RBC ANTIBODY SCREEN: CPT

## 2020-01-08 PROCEDURE — C1887: CPT

## 2020-01-08 PROCEDURE — 83935 ASSAY OF URINE OSMOLALITY: CPT

## 2020-01-08 PROCEDURE — 86901 BLOOD TYPING SEROLOGIC RH(D): CPT

## 2020-01-08 PROCEDURE — 93306 TTE W/DOPPLER COMPLETE: CPT

## 2020-01-08 PROCEDURE — 36415 COLL VENOUS BLD VENIPUNCTURE: CPT

## 2020-01-08 PROCEDURE — 85670 THROMBIN TIME PLASMA: CPT

## 2020-01-08 PROCEDURE — 85730 THROMBOPLASTIN TIME PARTIAL: CPT

## 2020-01-08 PROCEDURE — 99291 CRITICAL CARE FIRST HOUR: CPT | Mod: 25

## 2020-01-08 PROCEDURE — 84300 ASSAY OF URINE SODIUM: CPT

## 2020-01-08 PROCEDURE — 80048 BASIC METABOLIC PNL TOTAL CA: CPT

## 2020-01-08 PROCEDURE — 84439 ASSAY OF FREE THYROXINE: CPT

## 2020-01-08 PROCEDURE — 93005 ELECTROCARDIOGRAM TRACING: CPT

## 2020-01-08 PROCEDURE — 82553 CREATINE MB FRACTION: CPT

## 2020-01-08 PROCEDURE — 84436 ASSAY OF TOTAL THYROXINE: CPT

## 2020-01-08 PROCEDURE — 82330 ASSAY OF CALCIUM: CPT

## 2020-01-08 PROCEDURE — 94660 CPAP INITIATION&MGMT: CPT

## 2020-01-08 PROCEDURE — 86376 MICROSOMAL ANTIBODY EACH: CPT

## 2020-01-08 PROCEDURE — 82962 GLUCOSE BLOOD TEST: CPT

## 2020-01-08 PROCEDURE — 83036 HEMOGLOBIN GLYCOSYLATED A1C: CPT

## 2020-01-08 PROCEDURE — 81001 URINALYSIS AUTO W/SCOPE: CPT

## 2020-01-08 PROCEDURE — 82803 BLOOD GASES ANY COMBINATION: CPT

## 2020-01-08 PROCEDURE — 86900 BLOOD TYPING SEROLOGIC ABO: CPT

## 2020-01-08 PROCEDURE — 84295 ASSAY OF SERUM SODIUM: CPT

## 2020-01-08 PROCEDURE — C1769: CPT

## 2020-01-08 PROCEDURE — 87086 URINE CULTURE/COLONY COUNT: CPT

## 2020-01-08 PROCEDURE — 99292 CRITICAL CARE ADDL 30 MIN: CPT | Mod: 25

## 2020-01-08 PROCEDURE — C1760: CPT

## 2020-01-08 PROCEDURE — 85025 COMPLETE CBC W/AUTO DIFF WBC: CPT

## 2020-01-08 PROCEDURE — C1894: CPT

## 2020-01-08 PROCEDURE — 82570 ASSAY OF URINE CREATININE: CPT

## 2020-01-08 PROCEDURE — 97161 PT EVAL LOW COMPLEX 20 MIN: CPT

## 2020-01-08 PROCEDURE — 84540 ASSAY OF URINE/UREA-N: CPT

## 2020-01-08 PROCEDURE — C8925: CPT

## 2020-01-08 PROCEDURE — 84484 ASSAY OF TROPONIN QUANT: CPT

## 2020-01-08 PROCEDURE — 94640 AIRWAY INHALATION TREATMENT: CPT

## 2020-01-08 PROCEDURE — 83735 ASSAY OF MAGNESIUM: CPT

## 2020-01-08 PROCEDURE — 71045 X-RAY EXAM CHEST 1 VIEW: CPT

## 2020-01-08 PROCEDURE — 82550 ASSAY OF CK (CPK): CPT

## 2020-01-08 PROCEDURE — 96374 THER/PROPH/DIAG INJ IV PUSH: CPT

## 2020-01-08 PROCEDURE — 84100 ASSAY OF PHOSPHORUS: CPT

## 2020-01-08 PROCEDURE — 80053 COMPREHEN METABOLIC PANEL: CPT

## 2020-01-08 PROCEDURE — 80076 HEPATIC FUNCTION PANEL: CPT

## 2020-01-08 PROCEDURE — 84443 ASSAY THYROID STIM HORMONE: CPT

## 2020-01-08 PROCEDURE — 85027 COMPLETE CBC AUTOMATED: CPT

## 2020-01-08 PROCEDURE — 84132 ASSAY OF SERUM POTASSIUM: CPT

## 2020-01-08 PROCEDURE — 84480 ASSAY TRIIODOTHYRONINE (T3): CPT

## 2020-01-08 PROCEDURE — 96376 TX/PRO/DX INJ SAME DRUG ADON: CPT

## 2020-01-08 PROCEDURE — 87040 BLOOD CULTURE FOR BACTERIA: CPT

## 2020-01-08 PROCEDURE — 85610 PROTHROMBIN TIME: CPT

## 2020-04-08 ENCOUNTER — EMERGENCY (EMERGENCY)
Facility: HOSPITAL | Age: 85
LOS: 1 days | Discharge: HOME | End: 2020-04-08
Attending: EMERGENCY MEDICINE | Admitting: EMERGENCY MEDICINE
Payer: MEDICARE

## 2020-04-08 VITALS
TEMPERATURE: 98 F | SYSTOLIC BLOOD PRESSURE: 130 MMHG | OXYGEN SATURATION: 99 % | DIASTOLIC BLOOD PRESSURE: 70 MMHG | RESPIRATION RATE: 18 BRPM | HEART RATE: 85 BPM | WEIGHT: 130.07 LBS

## 2020-04-08 DIAGNOSIS — Z96.7 PRESENCE OF OTHER BONE AND TENDON IMPLANTS: Chronic | ICD-10-CM

## 2020-04-08 DIAGNOSIS — Z98.89 OTHER SPECIFIED POSTPROCEDURAL STATES: Chronic | ICD-10-CM

## 2020-04-08 DIAGNOSIS — Z95.2 PRESENCE OF PROSTHETIC HEART VALVE: Chronic | ICD-10-CM

## 2020-04-08 DIAGNOSIS — Z98.49 CATARACT EXTRACTION STATUS, UNSPECIFIED EYE: Chronic | ICD-10-CM

## 2020-04-08 PROCEDURE — 99282 EMERGENCY DEPT VISIT SF MDM: CPT

## 2020-04-08 NOTE — ED PROVIDER NOTE - PHYSICAL EXAMINATION
VITAL SIGNS: I have reviewed nursing notes and confirm.  CONSTITUTIONAL: Well-developed; well-nourished; in no acute distress.  SKIN: Skin exam is warm and dry, no acute rash.  HEAD: Normocephalic; atraumatic.  EYES: PERRL, EOM intact; conjunctiva and sclera clear.  ENT: No nasal discharge; airway clear.  NECK: Supple; non tender.  CARD: S1, S2 normal; no murmurs, gallops, or rubs. irregularly irregular  RESP: Unlabored. No wheezes, rales or rhonchi.  ABD: soft; non-distended; non-tender  EXT: Normal ROM. + 3x3cm light ecchymoses L anterior thigh with induration mild TTP no violation of skin, otherwise Non-ttp all ext, distal pulses intact  LYMPH: No acute cervical adenopathy.  NEURO: Alert, oriented. Grossly unremarkable.  PSYCH: Cooperative, appropriate.

## 2020-04-08 NOTE — ED ADULT NURSE NOTE - NSIMPLEMENTINTERV_GEN_ALL_ED
Implemented All Fall with Harm Risk Interventions:  [place] to call system. Call bell, personal items and telephone within reach. Instruct patient to call for assistance. Room bathroom lighting operational. Non-slip footwear when patient is off stretcher. Physically safe environment: no spills, clutter or unnecessary equipment. Stretcher in lowest position, wheels locked, appropriate side rails in place. Provide visual cue, wrist band, yellow gown, etc. Monitor gait and stability. Monitor for mental status changes and reorient to person, place, and time. Review medications for side effects contributing to fall risk. Reinforce activity limits and safety measures with patient and family. Provide visual clues: red socks.

## 2020-04-08 NOTE — ED ADULT NURSE NOTE - CHIEF COMPLAINT QUOTE
elderly patient on eliquis wants bruise on her left thigh evaluated insists anticoagulant therapy is not cause of it, denies bumping into furniture or falling. Called her Cardiologist who told her to stay home and finally wore him down and was told to present to urgent care "but this was closer" Hx Afib, "leg fx years ago"

## 2020-04-08 NOTE — ED PROVIDER NOTE - PATIENT PORTAL LINK FT
You can access the FollowMyHealth Patient Portal offered by [facility] by registering at the following website: [email or web address]. By joining [facility]’s FollowMyHealth portal, you will also be able to view your health information using other applications (apps) compatible with our system.

## 2020-04-08 NOTE — ED PROVIDER NOTE - NSFOLLOWUPINSTRUCTIONS_ED_ALL_ED_FT
Log Out.    [employer]® CareNotes®     :  [facility]             CONTUSION IN ADULTS - Ambulatory Care     Contusion in Adults    AMBULATORY CARE:    A contusion is a bruise that appears on your skin after an injury. A bruise happens when small blood vessels tear but skin does not. Blood leaks into nearby tissue, such as soft tissue or muscle.    Other signs and symptoms you may have with a contusion:     Pain that increases when you touch the bruise, walk, or use the area around the bruise      Swelling or a lump at the site of the bruise or near it      Red, blue, or black skin that may change to green or yellow after a few days      Stiffness or problems moving the bruised area of your body    Seek care immediately if:     You have new trouble moving the injured area.      You have tingling or numbness in or near the injured area.      Your hand or foot below the bruise gets cold or turns pale.     Call your doctor if:     You find a new lump in the injured area.      Your symptoms do not improve with treatment after 4 to 5 days.      You have questions or concerns about your condition or care.    Treatment may not be needed. The following may be needed if you have a serious injury:    NSAIDs, such as ibuprofen, help decrease swelling, pain, and fever. This medicine is available with or without a doctor's order. NSAIDs can cause stomach bleeding or kidney problems in certain people. If you take blood thinner medicine, always ask your healthcare provider if NSAIDs are safe for you. Always read the medicine label and follow directions.      Prescription pain medicine may be given. Ask your healthcare provider how to take this medicine safely. Some prescription pain medicines contain acetaminophen. Do not take other medicines that contain acetaminophen without talking to your healthcare provider. Too much acetaminophen may cause liver damage. Prescription pain medicine may cause constipation. Ask your healthcare provider how to prevent or treat constipation.       Aspiration is a procedure to drain pooled blood in your muscle. This may help prevent increased pressure in the muscle.      Surgery may be done to repair a tear in the muscle or relieve pressure in the muscle caused by swelling.    Help a contusion heal:     Rest the injured area or use it less than usual. If you bruised your leg or foot, you may need crutches or a cane to help you walk. This will help you keep weight off your injured body part.      Apply ice to decrease swelling and pain. Ice may also help prevent tissue damage. Use an ice pack, or put crushed ice in a plastic bag. Cover it with a towel and place it on your bruise for 15 to 20 minutes every hour or as directed.      Use compression to support the area and decrease swelling. Wrap an elastic bandage around the area over the bruised muscle. Make sure the bandage is not too tight. You should be able to fit 1 finger between the bandage and your skin.      Elevate (raise) your injured body part above the level of your heart to help decrease pain and swelling. Use pillows, blankets, or rolled towels to elevate the area as often as you can.      Do not drink alcohol as directed. Alcohol may slow healing.      Do not stretch injured muscles right after your injury. Ask your healthcare provider when and how you may safely stretch after your injury. Gentle stretches can help increase your flexibility.      Do not massage the area or put heating pads on the bruise right after your injury. Heat and massage may slow healing. Your healthcare provider may tell you to apply heat after several days. At that time, heat will start to help the injury heal.    Follow up with your doctor as directed: Write down your questions so you remember to ask them during your visits.

## 2020-04-08 NOTE — ED ADULT TRIAGE NOTE - CHIEF COMPLAINT QUOTE
elderly patient on eliquis wants bruise on her thigh evaluated insists anticoagulant therapy is not cause of it, denies bumping into furniture or falling. Called her Cardiologist who told her to stay home and finally wore him down and was told to present to urgent care "but this was closer" Hx Afib

## 2020-04-08 NOTE — ED ADULT TRIAGE NOTE - ARRIVAL FROM
Cayden Thompson  1948  71 y.o.  [phone]      Date: 07/18/2019    PCP: Aidan Borja MD    Chief Complaint   Patient presents with   • Hypertension       Problem List:  1. Exertional chest pain:  a. [facility] 2012: demonstrating minor luminal irregularities  b. [facility], 04/01/2019, Breeding:  Near normal CORS with EF >75%, mod-severe LVH with mid LV cavity obliteration.  Concern for microvascular disease.    2. Diastolic heart failure:  a. Echo 4/1/19:  No significant valvular abnormalities appreciated.  Elevated left ventricular filling pressures at rest consistent with diastolic heart failure.    3. Hypertension  4. Hyperlipidemia  5. Anxiety  6. GERD  7. Surgeries:  a. S/P lumbar fusion            Allergies   Allergen Reactions   • Morphine    • Sulfa Antibiotics        Current Medications:      Current Outpatient Medications:   •  aspirin 81 MG EC tablet, Take 1 tablet by mouth daily., Disp: , Rfl:   •  calcium-vitamin D (OSCAL-500) 500-200 MG-UNIT per tablet, Take 1 tablet by mouth daily., Disp: , Rfl:   •  clonazePAM (KlonoPIN) 0.5 MG tablet, TAKE 0.5 TABLET BY MOUTH EVERY EVENING AS NEEDED FOR ANXIOUSNESS, Disp: , Rfl:   •  Estradiol (IMVEXXY STARTER PACK) 10 MCG insert, Insert 10 mcg into the vagina 2 (Two) Times a Week., Disp: 18 each, Rfl: 3  •  glucose blood test strip, Check Blood sugar before meals and at bedtime., Disp: 100 each, Rfl: 1  •  metoprolol succinate XL (TOPROL-XL) 50 MG 24 hr tablet, Take 1 tablet by mouth Daily., Disp: 30 tablet, Rfl: 11  •  nitroglycerin (NITROSTAT) 0.4 MG SL tablet, Place 0.4 mg under the tongue Every 5 (Five) Minutes As Needed for Chest Pain. Take no more than 3 doses in 15 minutes., Disp: , Rfl:   •  pantoprazole (PROTONIX) 40 MG EC tablet, Take 1 tablet by mouth daily, Disp: , Rfl:   •  rosuvastatin (CRESTOR) 20 MG tablet, 1/2 tab qhs, Disp: , Rfl:   •  sertraline (ZOLOFT) 100 MG tablet, 1/2 tab qhs, Disp: , Rfl:   •  verapamil SR (CALAN-SR) 120 MG CR tablet, Take 1  "tablet by mouth Every Night. (Patient taking differently: Take 120 mg by mouth Every Night. 1/2 tablet daily), Disp: 30 tablet, Rfl: 11  •  ACCU-CHEK FASTCLIX LANCETS misc, Check blood sugar before meals and at bedtime, Disp: 100 each, Rfl: 1    HPI    Cayden Thompson is a 71 y.o. female who presents today for 2 month follow up of chest pain, diastolic heart failure, hypertension, and hyperlipidemia. Since last visit, she visited the ER with low blood pressure and dehydration. Since then, she has been cutting her verapamil 120 mg in half due to low blood pressure. She brings a BP log today, with readings typically between 115-130 mmHg systolic. She has not been taking her Lasix as she has not been experiencing any fluid retention. Patient denies chest pain, palpitations, shortness of breath, edema, dizziness, and syncope. Patient owns a treadmill but admits she has not been exercising routinely.    The following portions of the patient's history were reviewed and updated as appropriate: allergies, current medications and problem list.    Pertinent positives as listed in the HPI.  All other systems reviewed are negative.    Vitals:    07/18/19 1107   BP: 124/70   BP Location: Left arm   Patient Position: Sitting   Pulse: 65   Resp: 18   Weight: 66.8 kg (147 lb 3.2 oz)   Height: 167.6 cm (66\")       Physical Exam:    General: Alert and oriented.  Neck: Jugular venous pressure is within normal limits. Carotids have normal upstrokes without bruits.   Cardiovascular: Heart has a nondisplaced focal PMI. Regular rate and rhythm without murmur, gallop or rub.  Lungs: Clear without rales or wheezes. Equal expansion is noted.   Extremities: Show no edema.  Skin: Warm and dry.  Neurologic: Nonfocal.    Diagnostic Data:  Lab Results   Component Value Date    GLUCOSE 109 (H) 04/03/2019    BUN 18 04/03/2019    CREATININE 1.20 04/03/2019    EGFRIFNONA 44 (L) 04/03/2019    BCR 15.0 04/03/2019     04/03/2019    K 4.5 " 04/03/2019     04/03/2019    CO2 28.0 04/03/2019    CALCIUM 9.7 04/03/2019     Lab Results   Component Value Date    CHOL 133 04/02/2019    TRIG 184 (H) 04/02/2019    HDL 47 04/02/2019    LDL 49 04/02/2019      Lab Results   Component Value Date    WBC 4.85 04/02/2019    HGB 11.7 (L) 04/02/2019    HCT 34.8 04/02/2019    MCV 86.6 04/02/2019     04/02/2019     Lab Results   Component Value Date    TSH 2.150 04/02/2019       Procedures    Assessment:      ICD-10-CM ICD-9-CM   1. Essential hypertension I10 401.9   2. Mixed hyperlipidemia E78.2 272.2   3. Chronic diastolic (congestive) heart failure (CMS/[facility]) I50.32 428.32     428.0   Near cavitary obliteration at rest     Lab results found above were reviewed with the patient.    Plan:      1. Begin regular aerobic exercise, at least 30 minutes, 4-5 days per week.   2. Increase water intake. DO NOT BECOME DEHYDRATED  3. Continue metoprolol 25 and verapamil 120 for hypertension and hyperdynamic LV.  4. Continue rosuvastatin 20 mg for hyperlipidemia.  5. Continue all other current medications.  6. F/up in 6 months or sooner if needed.      Scribed for Mireille Fitzgerald MD by Constance Higginbotham. 7/18/2019  11:25 AM                Doctor's office

## 2020-04-08 NOTE — ED PROVIDER NOTE - OBJECTIVE STATEMENT
88 y/o F w/a.fib on eliquis p/w L anterior thigh pain x1 day w/swelling and bruising. No difficulty ambulating. No fever/chills. Doesn't recall any trauma. Took tylenol yesterday w/good relief. Not requiring pain medication today. Thinks that swelling is a bit better today. Cardiologist sent pt to ED for eval.

## 2020-04-11 DIAGNOSIS — Z88.6 ALLERGY STATUS TO ANALGESIC AGENT: ICD-10-CM

## 2020-04-11 DIAGNOSIS — Z88.8 ALLERGY STATUS TO OTHER DRUGS, MEDICAMENTS AND BIOLOGICAL SUBSTANCES STATUS: ICD-10-CM

## 2020-04-11 DIAGNOSIS — M79.81 NONTRAUMATIC HEMATOMA OF SOFT TISSUE: ICD-10-CM

## 2020-04-11 DIAGNOSIS — Z88.5 ALLERGY STATUS TO NARCOTIC AGENT: ICD-10-CM

## 2020-04-11 DIAGNOSIS — M79.652 PAIN IN LEFT THIGH: ICD-10-CM

## 2020-05-17 NOTE — ED ADULT NURSE NOTE - PAIN RATING/NUMBER SCALE (0-10): ACTIVITY
Date of Progress Note:  05/16/2020



Subjective:  Mr. Barber has been followed for acute on chronic congestive heart failure that is syst
olic.  He has paroxysmal atrial fibrillation, hypertension, diabetes, anemia and acute kidney injury.
  He is also bradycardic and slightly hypotensive.  Today, he was seen by Nephrology.  His losartan w
as decreased.  His Coreg was decreased.  His Neurontin was decreased.  His hydralazine was decreased 
as well as his Flomax.  He is feeling better and improving and we will see what his blood pressure an
d heart rate does on the new medical changes that were done by Nephrology.  We will continue to follo
w him.  He is in sinus rhythm now.  He is also on Pradaxa.





NB/MODL

DD:  05/17/2020 05:55:53Voice ID:  [id number]

DT:  05/17/2020 09:38:07Report ID:  [id number]

## 2020-05-27 ENCOUNTER — EMERGENCY (EMERGENCY)
Facility: HOSPITAL | Age: 85
LOS: 1 days | Discharge: HOME | End: 2020-05-27
Attending: EMERGENCY MEDICINE | Admitting: EMERGENCY MEDICINE
Payer: MEDICARE

## 2020-05-27 VITALS
TEMPERATURE: 99 F | DIASTOLIC BLOOD PRESSURE: 57 MMHG | HEART RATE: 77 BPM | WEIGHT: 115.08 LBS | RESPIRATION RATE: 18 BRPM | SYSTOLIC BLOOD PRESSURE: 133 MMHG | HEIGHT: 59 IN | OXYGEN SATURATION: 95 %

## 2020-05-27 VITALS
OXYGEN SATURATION: 96 % | SYSTOLIC BLOOD PRESSURE: 158 MMHG | HEART RATE: 68 BPM | RESPIRATION RATE: 16 BRPM | TEMPERATURE: 98 F | DIASTOLIC BLOOD PRESSURE: 67 MMHG

## 2020-05-27 DIAGNOSIS — Z96.7 PRESENCE OF OTHER BONE AND TENDON IMPLANTS: Chronic | ICD-10-CM

## 2020-05-27 DIAGNOSIS — Z98.89 OTHER SPECIFIED POSTPROCEDURAL STATES: Chronic | ICD-10-CM

## 2020-05-27 DIAGNOSIS — L03.113 CELLULITIS OF RIGHT UPPER LIMB: ICD-10-CM

## 2020-05-27 DIAGNOSIS — Z95.2 PRESENCE OF PROSTHETIC HEART VALVE: Chronic | ICD-10-CM

## 2020-05-27 DIAGNOSIS — Z98.49 CATARACT EXTRACTION STATUS, UNSPECIFIED EYE: Chronic | ICD-10-CM

## 2020-05-27 LAB
ALBUMIN SERPL ELPH-MCNC: 3.7 G/DL — SIGNIFICANT CHANGE UP (ref 3.4–5)
ALP SERPL-CCNC: 68 U/L — SIGNIFICANT CHANGE UP (ref 40–120)
ALT FLD-CCNC: 27 U/L — SIGNIFICANT CHANGE UP (ref 12–42)
ANION GAP SERPL CALC-SCNC: 6 MMOL/L — LOW (ref 9–16)
AST SERPL-CCNC: 24 U/L — SIGNIFICANT CHANGE UP (ref 15–37)
BASOPHILS # BLD AUTO: 0.04 K/UL — SIGNIFICANT CHANGE UP (ref 0–0.2)
BASOPHILS NFR BLD AUTO: 0.5 % — SIGNIFICANT CHANGE UP (ref 0–2)
BILIRUB SERPL-MCNC: 0.6 MG/DL — SIGNIFICANT CHANGE UP (ref 0.2–1.2)
BUN SERPL-MCNC: 18 MG/DL — SIGNIFICANT CHANGE UP (ref 7–23)
CALCIUM SERPL-MCNC: 8.8 MG/DL — SIGNIFICANT CHANGE UP (ref 8.5–10.5)
CHLORIDE SERPL-SCNC: 101 MMOL/L — SIGNIFICANT CHANGE UP (ref 96–108)
CO2 SERPL-SCNC: 30 MMOL/L — SIGNIFICANT CHANGE UP (ref 22–31)
CREAT SERPL-MCNC: 1.02 MG/DL — SIGNIFICANT CHANGE UP (ref 0.5–1.3)
CRP SERPL-MCNC: 2.1 MG/DL — HIGH (ref 0–0.9)
EOSINOPHIL # BLD AUTO: 0.09 K/UL — SIGNIFICANT CHANGE UP (ref 0–0.5)
EOSINOPHIL NFR BLD AUTO: 1.2 % — SIGNIFICANT CHANGE UP (ref 0–6)
GLUCOSE SERPL-MCNC: 117 MG/DL — HIGH (ref 70–99)
HCT VFR BLD CALC: 36.8 % — SIGNIFICANT CHANGE UP (ref 34.5–45)
HGB BLD-MCNC: 12.1 G/DL — SIGNIFICANT CHANGE UP (ref 11.5–15.5)
IMM GRANULOCYTES NFR BLD AUTO: 0.3 % — SIGNIFICANT CHANGE UP (ref 0–1.5)
LYMPHOCYTES # BLD AUTO: 1.2 K/UL — SIGNIFICANT CHANGE UP (ref 1–3.3)
LYMPHOCYTES # BLD AUTO: 16.5 % — SIGNIFICANT CHANGE UP (ref 13–44)
MCHC RBC-ENTMCNC: 32.1 PG — SIGNIFICANT CHANGE UP (ref 27–34)
MCHC RBC-ENTMCNC: 32.9 GM/DL — SIGNIFICANT CHANGE UP (ref 32–36)
MCV RBC AUTO: 97.6 FL — SIGNIFICANT CHANGE UP (ref 80–100)
MONOCYTES # BLD AUTO: 0.65 K/UL — SIGNIFICANT CHANGE UP (ref 0–0.9)
MONOCYTES NFR BLD AUTO: 8.9 % — SIGNIFICANT CHANGE UP (ref 2–14)
NEUTROPHILS # BLD AUTO: 5.29 K/UL — SIGNIFICANT CHANGE UP (ref 1.8–7.4)
NEUTROPHILS NFR BLD AUTO: 72.6 % — SIGNIFICANT CHANGE UP (ref 43–77)
NRBC # BLD: 0 /100 WBCS — SIGNIFICANT CHANGE UP (ref 0–0)
PLATELET # BLD AUTO: 198 K/UL — SIGNIFICANT CHANGE UP (ref 150–400)
POTASSIUM SERPL-MCNC: 4.1 MMOL/L — SIGNIFICANT CHANGE UP (ref 3.5–5.3)
POTASSIUM SERPL-SCNC: 4.1 MMOL/L — SIGNIFICANT CHANGE UP (ref 3.5–5.3)
PROT SERPL-MCNC: 7.7 G/DL — SIGNIFICANT CHANGE UP (ref 6.4–8.2)
RBC # BLD: 3.77 M/UL — LOW (ref 3.8–5.2)
RBC # FLD: 15.2 % — HIGH (ref 10.3–14.5)
SODIUM SERPL-SCNC: 137 MMOL/L — SIGNIFICANT CHANGE UP (ref 132–145)
WBC # BLD: 7.29 K/UL — SIGNIFICANT CHANGE UP (ref 3.8–10.5)
WBC # FLD AUTO: 7.29 K/UL — SIGNIFICANT CHANGE UP (ref 3.8–10.5)

## 2020-05-27 PROCEDURE — 99220: CPT | Mod: 25

## 2020-05-27 PROCEDURE — 73120 X-RAY EXAM OF HAND: CPT | Mod: 26,RT

## 2020-05-27 PROCEDURE — 73110 X-RAY EXAM OF WRIST: CPT | Mod: 26,RT

## 2020-05-27 RX ORDER — ACETAMINOPHEN 500 MG
650 TABLET ORAL ONCE
Refills: 0 | Status: COMPLETED | OUTPATIENT
Start: 2020-05-27 | End: 2020-05-27

## 2020-05-27 RX ADMIN — Medication 650 MILLIGRAM(S): at 12:19

## 2020-05-27 RX ADMIN — Medication 600 MILLIGRAM(S): at 18:40

## 2020-05-27 RX ADMIN — Medication 100 MILLIGRAM(S): at 12:19

## 2020-05-27 RX ADMIN — Medication 600 MILLIGRAM(S): at 18:34

## 2020-05-27 RX ADMIN — Medication 100 MILLIGRAM(S): at 18:39

## 2020-05-27 NOTE — ED ADULT NURSE NOTE - CHIEF COMPLAINT QUOTE
c/o worsening redness and swelling to right wrist since Tuesday. as per pt, woke up with her wrist/hand red and swollen. went to [facility], started on 2 PO abx, since then has gotten worse and was told she needed IV abx. denies fevers, injury.

## 2020-05-27 NOTE — ED PROVIDER NOTE - NS ED ROS FT
CONST: no fevers, no chills, no trauma  EYES: no pain, no visual disturbances  ENT: no epistaxis, no rhinorrhea, no hearing changes  CV: no chest pain, no palpitations, no orthopnea, + extremity pain or swelling  RESP: no shortness of breath, no cough, no sputum, no pleurisy, no wheezing  ABD: no abdominal pain, no nausea, no vomiting, no diarrhea, no black or bloody stool  : no dysuria, no hematuria, no frequency, no urgency  MSK: no back pain, no neck pain, + extremity pain  NEURO: no headache, no focal weakness  HEME: no easy bleeding or bruising  SKIN: no diaphoresis, + rash

## 2020-05-27 NOTE — ED PROVIDER NOTE - OBJECTIVE STATEMENT
86 yo F hx HTN, DM2, osteoporosis, presenting with 2 days of R hand/wrist swelling, warmth, erythema and pain. Patient notes that she woke up with small area of swelling and redness over dorsal aspect of hand on Tuesday. Went to Urgent Care yesterday who prescribed her Bactrim and Keflex. She has since taken two doses. This morning she noted that the are of swelling increased so was urged to come to the ED. Denies fevers/chills though she has been taking Tylenol for pain. No known breaks in skin. No known insect bites. History of cellulitis from cat scratches in past, but denies any recent cat scratches.

## 2020-05-27 NOTE — ED PROVIDER NOTE - CLINICAL SUMMARY MEDICAL DECISION MAKING FREE TEXT BOX
86 yo F presenting with cellulitis, failed PO abx, concern for lymphangitis. Will obtain xr r/o osteo, basic labs including CRP, tx IV clindamycin, plan to obs for IV abx tx and q8 checks of marked area.

## 2020-05-27 NOTE — ED PROVIDER NOTE - FAMILY HISTORY
Family history of diabetes mellitus     Mother  Still living? Unknown  Family history of hypertension, Age at diagnosis: Age Unknown  Family history of stroke, Age at diagnosis: Age Unknown     Father  Still living? Unknown  Family history of renal cell carcinoma, Age at diagnosis: Age Unknown

## 2020-05-27 NOTE — ED CDU PROVIDER INITIAL DAY NOTE - PHYSICAL EXAMINATION
VITALS: reviewed  GEN: NAD, A & O x 4  HEAD/EYES: NCAT, EOMI, anicteric sclerae  ENT: mucus membranes moist, oropharynx WNL, trachea midline  RESP: unlabored breathing  CV: distal pulses intact and symmetric bilaterally  ABDOMEN: normoactive bowel sounds, soft, nondistended, nontender, no palpable masses  : no CVAT  MSK: extremities atraumatic. FROM b/l wrists, R wrist tender with ROM.   SKIN: warm, dry, erythema to dorsal aspect of R hand extending to volar surface of wrist without increase in area of demarcation since arrival to ED, hot and tender to touch, associated stranding up forearm, no bruising, no cyanosis. color appropriate for ethnicity.   NEURO: alert, mentating appropriately, no facial asymmetry. gross sensation, motor, coordination are intact. decreased  strength R hand 2/2 pain.

## 2020-05-27 NOTE — ED CDU PROVIDER DISPOSITION NOTE - NSFOLLOWUPINSTRUCTIONS_ED_ALL_ED_FT
PLEASE RETURN HERE FRIDAY MORNING FOR RE-EVALUATION.      PLEASE CONTINUE BOTH OF YOUR ANTIBIOTICS AS DIRECTED.  YOU CAN TAKE YOUR NEXT DOSE TONIGHT BEFORE BED.     PLEASE RETURN TO THE ER IMMEDIATELY OR CALL 911 FOR ANY HIGH FEVER, TROUBLE BREATHING, VOMITING, SEVERE PAIN, OR ANY OTHER CONCERNS.

## 2020-05-27 NOTE — ED PROVIDER NOTE - ATTENDING CONTRIBUTION TO CARE
Pt is an 88yo F with a h/o HTN, DM, OP and p/w 2 days of R hand redness and swelling.  Pt was dx at  with cellulitis and started on Keflex and Bactrim.  Has taken two doses so far.  Redness and swelling worsening and now streaking up RUE.  Denies fevers/chills though she has been taking Tylenol.  All rings removed from R hand.  ROS - as above  PE - agree with Resident exam as above. NVID.   A/P - R UE cellulitis.  Will check labs and start IV abx.  Redness outlined here with surgical pen.     Labs WNL and VS stable.  Will place pt on observation for IV abx and monitoring of cellulitis. If worsens, will admit.

## 2020-05-27 NOTE — ED CDU PROVIDER INITIAL DAY NOTE - PROGRESS NOTE DETAILS
Pt has received 2 rounds of IV antibiotics.  No worsening of cellulitis.  Pt requesting d/c.  VS have remains stable and WNL.  Will have her return in 24-48 hours for a "wound check."

## 2020-05-27 NOTE — ED PROVIDER NOTE - PHYSICAL EXAMINATION
VITALS: reviewed  GEN: NAD, A & O x 4  HEAD/EYES: NCAT, EOMI, anicteric sclerae  ENT: mucus membranes moist, oropharynx WNL, trachea midline  RESP: unlabored breathing  CV: distal pulses intact and symmetric bilaterally  ABDOMEN: normoactive bowel sounds, soft, nondistended, nontender, no palpable masses  : no CVAT  MSK: extremities atraumatic. FROM b/l wrists, R wrist tender with ROM.   SKIN: warm, dry, erythema to dorsal aspect of R hand extending to volar surface of wrist, hot and tender to touch, associated stranding up forearm, no bruising, no cyanosis. color appropriate for ethnicity  NEURO: alert, mentating appropriately, no facial asymmetry. gross sensation, motor, coordination are intact. decreased  strength R hand 2/2 pain.  PSYCH: Affect appropriate

## 2020-05-27 NOTE — ED CDU PROVIDER INITIAL DAY NOTE - OBJECTIVE STATEMENT
· HPI Objective Statement: 88 yo F hx HTN, DM2, osteoporosis, presenting with 2 days of R hand/wrist swelling, warmth, erythema and pain. Patient notes that she woke up with small area of swelling and redness over dorsal aspect of hand on Tuesday. Went to Urgent Care yesterday who prescribed her Bactrim and Keflex. She has since taken two doses. This morning she noted that the are of swelling increased so was urged to come to the ED. Denies fevers/chills though she has been taking Tylenol for pain. No known breaks in skin. No known insect bites. History of cellulitis from cat scratches in past, but denies any recent cat scratches.
[facility]:  Ambulatory Surgery [facility]...

## 2020-05-27 NOTE — ED CDU PROVIDER INITIAL DAY NOTE - MEDICAL DECISION MAKING DETAILS
86 yo F presenting with cellulitis, failed PO abx, concern for lymphangitis. Will obtain xr r/o osteo, basic labs including CRP, tx IV clindamycin, plan to obs for IV abx tx and q8 checks of demarcated area.

## 2020-05-27 NOTE — ED CDU PROVIDER DISPOSITION NOTE - PATIENT PORTAL LINK FT
You can access the FollowMyHealth Patient Portal offered by [facility] by registering at the following website: [email or web address]. By joining [email or web address] FollowMyHealth portal, you will also be able to view your health information using other applications (apps) compatible with our system.

## 2020-05-27 NOTE — ED PROVIDER NOTE - PROGRESS NOTE DETAILS
Zoltan PGY3: cellulitis spread from marked area between 11 am and 3:30 pm, no change at 6 pm. Zoltan PGY3: cellulitis spread from marked area between 11 am and 3:30 pm, no increase in size at 6 pm, patient reports improvement in pain and swelling with mild improvement of wrist ROM.

## 2020-05-29 ENCOUNTER — EMERGENCY (EMERGENCY)
Facility: HOSPITAL | Age: 85
LOS: 1 days | Discharge: HOME | End: 2020-05-29
Attending: EMERGENCY MEDICINE | Admitting: EMERGENCY MEDICINE
Payer: MEDICARE

## 2020-05-29 VITALS
SYSTOLIC BLOOD PRESSURE: 133 MMHG | HEIGHT: 59 IN | DIASTOLIC BLOOD PRESSURE: 77 MMHG | OXYGEN SATURATION: 98 % | TEMPERATURE: 98 F | RESPIRATION RATE: 18 BRPM | WEIGHT: 104.94 LBS | HEART RATE: 84 BPM

## 2020-05-29 DIAGNOSIS — Z95.2 PRESENCE OF PROSTHETIC HEART VALVE: Chronic | ICD-10-CM

## 2020-05-29 DIAGNOSIS — Z98.89 OTHER SPECIFIED POSTPROCEDURAL STATES: Chronic | ICD-10-CM

## 2020-05-29 DIAGNOSIS — Z98.49 CATARACT EXTRACTION STATUS, UNSPECIFIED EYE: Chronic | ICD-10-CM

## 2020-05-29 DIAGNOSIS — Z96.7 PRESENCE OF OTHER BONE AND TENDON IMPLANTS: Chronic | ICD-10-CM

## 2020-05-29 PROCEDURE — 99282 EMERGENCY DEPT VISIT SF MDM: CPT

## 2020-05-29 NOTE — ED ADULT TRIAGE NOTE - CHIEF COMPLAINT QUOTE
Patient to ED stating, "I was told by Lb Blair to come back before 10 am today, so ask him why I am here:.  Patient in no distress

## 2020-05-29 NOTE — ED PROVIDER NOTE - OBJECTIVE STATEMENT
87 y o female with PMHX of HTN, DM, osteoporosis, seen here 5/27 for right hand cellulitis refractory to outpatient Keflex and Bactrim. Pt returns today for reevolution as instructed. She denies fever, chills, drainage, numbness, tingling, or weakness. States sx have remarkably improved and endorses no complaints at this time. Pt is currently on Bactrim and Keflex. Review of EHR shows she was given IV clindamycin here.

## 2020-05-29 NOTE — ED PROVIDER NOTE - SKIN, MLM
Right hand: no erythema, no warmth of dorsal aspect of hand. No epitrochlear adenopathy, full ROM with flexion and extension of digits, wrist, and elbow. No lymphangitis.

## 2020-05-29 NOTE — ED ADULT NURSE NOTE - NSIMPLEMENTINTERV_GEN_ALL_ED
Implemented All Universal Safety Interventions:  [place] to call system. Call bell, personal items and telephone within reach. Instruct patient to call for assistance. Room bathroom lighting operational. Non-slip footwear when patient is off stretcher. Physically safe environment: no spills, clutter or unnecessary equipment. Stretcher in lowest position, wheels locked, appropriate side rails in place.

## 2020-05-29 NOTE — ED PROVIDER NOTE - CLINICAL SUMMARY MEDICAL DECISION MAKING FREE TEXT BOX
Pt presents with resolving cellulitis. Will continue outpatient abx as prescribed. Discussed with pt to return if sx worsen. No IV abx warranted at this time.

## 2020-06-02 DIAGNOSIS — Z79.82 LONG TERM (CURRENT) USE OF ASPIRIN: ICD-10-CM

## 2020-06-02 DIAGNOSIS — Z88.5 ALLERGY STATUS TO NARCOTIC AGENT: ICD-10-CM

## 2020-06-02 DIAGNOSIS — L03.113 CELLULITIS OF RIGHT UPPER LIMB: ICD-10-CM

## 2020-06-02 DIAGNOSIS — I10 ESSENTIAL (PRIMARY) HYPERTENSION: ICD-10-CM

## 2020-06-02 DIAGNOSIS — Z79.84 LONG TERM (CURRENT) USE OF ORAL HYPOGLYCEMIC DRUGS: ICD-10-CM

## 2020-06-02 DIAGNOSIS — E11.9 TYPE 2 DIABETES MELLITUS WITHOUT COMPLICATIONS: ICD-10-CM

## 2020-06-02 DIAGNOSIS — Z88.8 ALLERGY STATUS TO OTHER DRUGS, MEDICAMENTS AND BIOLOGICAL SUBSTANCES STATUS: ICD-10-CM

## 2020-06-02 DIAGNOSIS — Z79.899 OTHER LONG TERM (CURRENT) DRUG THERAPY: ICD-10-CM

## 2020-06-02 DIAGNOSIS — E03.9 HYPOTHYROIDISM, UNSPECIFIED: ICD-10-CM

## 2020-11-18 ENCOUNTER — EMERGENCY (EMERGENCY)
Facility: HOSPITAL | Age: 85
LOS: 1 days | Discharge: HOME | End: 2020-11-18
Attending: EMERGENCY MEDICINE | Admitting: EMERGENCY MEDICINE
Payer: MEDICARE

## 2020-11-18 VITALS
HEIGHT: 59 IN | TEMPERATURE: 98 F | DIASTOLIC BLOOD PRESSURE: 50 MMHG | OXYGEN SATURATION: 95 % | SYSTOLIC BLOOD PRESSURE: 90 MMHG | RESPIRATION RATE: 16 BRPM | HEART RATE: 72 BPM | WEIGHT: 113.98 LBS

## 2020-11-18 DIAGNOSIS — M25.551 PAIN IN RIGHT HIP: ICD-10-CM

## 2020-11-18 DIAGNOSIS — M25.552 PAIN IN LEFT HIP: ICD-10-CM

## 2020-11-18 DIAGNOSIS — Z98.49 CATARACT EXTRACTION STATUS, UNSPECIFIED EYE: Chronic | ICD-10-CM

## 2020-11-18 DIAGNOSIS — Z95.2 PRESENCE OF PROSTHETIC HEART VALVE: Chronic | ICD-10-CM

## 2020-11-18 DIAGNOSIS — Z96.7 PRESENCE OF OTHER BONE AND TENDON IMPLANTS: Chronic | ICD-10-CM

## 2020-11-18 DIAGNOSIS — Z98.89 OTHER SPECIFIED POSTPROCEDURAL STATES: Chronic | ICD-10-CM

## 2020-11-18 LAB
ANION GAP SERPL CALC-SCNC: 5 MMOL/L — LOW (ref 9–16)
APPEARANCE UR: CLEAR — SIGNIFICANT CHANGE UP
BASOPHILS # BLD AUTO: 0.05 K/UL — SIGNIFICANT CHANGE UP (ref 0–0.2)
BASOPHILS NFR BLD AUTO: 0.7 % — SIGNIFICANT CHANGE UP (ref 0–2)
BILIRUB UR-MCNC: NEGATIVE — SIGNIFICANT CHANGE UP
BUN SERPL-MCNC: 26 MG/DL — HIGH (ref 7–23)
CALCIUM SERPL-MCNC: 8.8 MG/DL — SIGNIFICANT CHANGE UP (ref 8.5–10.5)
CHLORIDE SERPL-SCNC: 104 MMOL/L — SIGNIFICANT CHANGE UP (ref 96–108)
CO2 SERPL-SCNC: 30 MMOL/L — SIGNIFICANT CHANGE UP (ref 22–31)
COLOR SPEC: YELLOW — SIGNIFICANT CHANGE UP
CREAT SERPL-MCNC: 1.02 MG/DL — SIGNIFICANT CHANGE UP (ref 0.5–1.3)
DIFF PNL FLD: NEGATIVE — SIGNIFICANT CHANGE UP
EOSINOPHIL # BLD AUTO: 0.29 K/UL — SIGNIFICANT CHANGE UP (ref 0–0.5)
EOSINOPHIL NFR BLD AUTO: 3.8 % — SIGNIFICANT CHANGE UP (ref 0–6)
GLUCOSE SERPL-MCNC: 89 MG/DL — SIGNIFICANT CHANGE UP (ref 70–99)
GLUCOSE UR QL: NEGATIVE — SIGNIFICANT CHANGE UP
HCT VFR BLD CALC: 36 % — SIGNIFICANT CHANGE UP (ref 34.5–45)
HGB BLD-MCNC: 11.7 G/DL — SIGNIFICANT CHANGE UP (ref 11.5–15.5)
IMM GRANULOCYTES NFR BLD AUTO: 0.4 % — SIGNIFICANT CHANGE UP (ref 0–1.5)
KETONES UR-MCNC: NEGATIVE — SIGNIFICANT CHANGE UP
LEUKOCYTE ESTERASE UR-ACNC: NEGATIVE — SIGNIFICANT CHANGE UP
LYMPHOCYTES # BLD AUTO: 1.28 K/UL — SIGNIFICANT CHANGE UP (ref 1–3.3)
LYMPHOCYTES # BLD AUTO: 16.8 % — SIGNIFICANT CHANGE UP (ref 13–44)
MCHC RBC-ENTMCNC: 32.1 PG — SIGNIFICANT CHANGE UP (ref 27–34)
MCHC RBC-ENTMCNC: 32.5 GM/DL — SIGNIFICANT CHANGE UP (ref 32–36)
MCV RBC AUTO: 98.6 FL — SIGNIFICANT CHANGE UP (ref 80–100)
MONOCYTES # BLD AUTO: 0.57 K/UL — SIGNIFICANT CHANGE UP (ref 0–0.9)
MONOCYTES NFR BLD AUTO: 7.5 % — SIGNIFICANT CHANGE UP (ref 2–14)
NEUTROPHILS # BLD AUTO: 5.41 K/UL — SIGNIFICANT CHANGE UP (ref 1.8–7.4)
NEUTROPHILS NFR BLD AUTO: 70.8 % — SIGNIFICANT CHANGE UP (ref 43–77)
NITRITE UR-MCNC: NEGATIVE — SIGNIFICANT CHANGE UP
NRBC # BLD: 0 /100 WBCS — SIGNIFICANT CHANGE UP (ref 0–0)
PH UR: 5 — SIGNIFICANT CHANGE UP (ref 5–8)
PLATELET # BLD AUTO: 218 K/UL — SIGNIFICANT CHANGE UP (ref 150–400)
POTASSIUM SERPL-MCNC: 4.4 MMOL/L — SIGNIFICANT CHANGE UP (ref 3.5–5.3)
POTASSIUM SERPL-SCNC: 4.4 MMOL/L — SIGNIFICANT CHANGE UP (ref 3.5–5.3)
PROT UR-MCNC: NEGATIVE MG/DL — SIGNIFICANT CHANGE UP
RBC # BLD: 3.65 M/UL — LOW (ref 3.8–5.2)
RBC # FLD: 14 % — SIGNIFICANT CHANGE UP (ref 10.3–14.5)
SODIUM SERPL-SCNC: 139 MMOL/L — SIGNIFICANT CHANGE UP (ref 132–145)
SP GR SPEC: <=1.005 — SIGNIFICANT CHANGE UP (ref 1–1.03)
UROBILINOGEN FLD QL: 0.2 E.U./DL — SIGNIFICANT CHANGE UP
WBC # BLD: 7.63 K/UL — SIGNIFICANT CHANGE UP (ref 3.8–10.5)
WBC # FLD AUTO: 7.63 K/UL — SIGNIFICANT CHANGE UP (ref 3.8–10.5)

## 2020-11-18 PROCEDURE — 99284 EMERGENCY DEPT VISIT MOD MDM: CPT

## 2020-11-18 PROCEDURE — 74177 CT ABD & PELVIS W/CONTRAST: CPT | Mod: 26

## 2020-11-18 RX ORDER — SODIUM CHLORIDE 9 MG/ML
1000 INJECTION INTRAMUSCULAR; INTRAVENOUS; SUBCUTANEOUS ONCE
Refills: 0 | Status: COMPLETED | OUTPATIENT
Start: 2020-11-18 | End: 2020-11-18

## 2020-11-18 RX ADMIN — SODIUM CHLORIDE 1000 MILLILITER(S): 9 INJECTION INTRAMUSCULAR; INTRAVENOUS; SUBCUTANEOUS at 13:31

## 2020-11-18 NOTE — ED PROVIDER NOTE - CHPI ED SYMPTOMS NEG
no loss of consciousness/no head trauma, no fever, no chills, no chest pain, no SOB, no weakness, no numbness, no saddle anesthesia, no urinary/bowel incontinence

## 2020-11-18 NOTE — ED ADULT NURSE NOTE - NSIMPLEMENTINTERV_GEN_ALL_ED
Implemented All Fall with Harm Risk Interventions:  [place] to call system. Call bell, personal items and telephone within reach. Instruct patient to call for assistance. Room bathroom lighting operational. Non-slip footwear when patient is off stretcher. Physically safe environment: no spills, clutter or unnecessary equipment. Stretcher in lowest position, wheels locked, appropriate side rails in place. Provide visual cue, wrist band, yellow gown, etc. Monitor gait and stability. Monitor for mental status changes and reorient to person, place, and time. Review medications for side effects contributing to fall risk. Reinforce activity limits and safety measures with patient and family. Provide visual clues: red socks.

## 2020-11-18 NOTE — ED PROVIDER NOTE - PATIENT PORTAL LINK FT
You can access the FollowMyHealth Patient Portal offered by [facility] by registering at the following website: [email or web address]. By joining [email or web address]’s FollowMyHealth portal, you will also be able to view your health information using other applications (apps) compatible with our system.

## 2020-11-18 NOTE — ED PROVIDER NOTE - OBJECTIVE STATEMENT
89 y/o female with PMHx of aortic stenosis, HTN, hypothyroid, osteoporosis, scoliosis, SVT, with +pacemaker, and on Eliquis BID presents to the ED s/p mechanical trip and fall 2 days ago. Patient states initially she had no pain, but is now c/o bilateral buttock pain with radiation to lower abdominal region. Pain is exacerbated with bending, walking, and standing. Able to ambulate without assistance. Denies LOC or head trauma. Denies fever, chills, chest pain, SOB, numbness, weakness, saddle anesthesia, urinary/bowel incontinence.

## 2020-11-18 NOTE — ED PROVIDER NOTE - CLINICAL SUMMARY MEDICAL DECISION MAKING FREE TEXT BOX
Patient presenting s/p mechanical trip and fall 2 days ago now c/o bilateral buttock pain. Will obtain basic labs and CT abdomen pelvis. No need for CT head at this time as Patient did not hit her head.

## 2020-11-18 NOTE — ED PROVIDER NOTE - MUSCULOSKELETAL, MLM
Spine appears normal, range of motion is not limited, no muscle or joint tenderness. No bruising, no step offs, no deformities.

## 2020-11-18 NOTE — ED ADULT TRIAGE NOTE - RESPIRATORY RATE (BREATHS/MIN)
Assessment: Good healing of the Guzmán's fracture that we performed open reduction internal fixation on on the left thumb. Today we brought her back for cast removal and repeat x-rays on her right wrist where she had the distal radius fracture. She also has an underlying scapholunate ligament disruption that I do not know whether is new or old radiographically    Treatment Plan: She really has minimal pain at this point to palpation of the wrist in particular no pain on palpation of the scapholunate interval.  We are going to allow her to do strengthening exercises with her left thumb and we will also start her in range of motion therapy for her right wrist    Return in about 4 weeks (around 9/30/2020) for X-ray next visit. History of Present Illness  Tia Gallagher is a 71 y.o. female. Location:  Left thumb, right wrist  Severity: minimal complaints of pain Duration: 07/21/20  Modifying factors: cast, surgery Associated symptoms: none    Review of Systems  Pertinent items are noted in HPI  Denies fever chills, confusion and bowel and bladder active change  Complete Review of Systems reviewed from patient history form dated 07/27/20 and available in the patients chart under the media tab. Vital Signs  There were no vitals filed for this visit. There is no height or weight on file to calculate BMI. Contributory History  None    Medical History  Past Medical History:   Diagnosis Date    Anxiety     Hyperlipidemia      Current Outpatient Medications on File Prior to Visit   Medication Sig Dispense Refill    cephALEXin (KEFLEX) 500 MG capsule Take 1 capsule by mouth 4 times daily 28 capsule 0    acetaminophen (TYLENOL) 500 MG tablet Take 500 mg by mouth every 6 hours as needed for Pain 2 tablets      HYDROcodone-acetaminophen (NORCO) 5-325 MG per tablet Take 1 tablet by mouth every 6 hours as needed for Pain.       pravastatin (PRAVACHOL) 40 MG tablet TAKE ONE TABLET BY MOUTH DAILY 90 tablet 3    citalopram (CELEXA) 10 MG tablet TAKE ONE TABLET BY MOUTH DAILY 90 tablet 3     No current facility-administered medications on file prior to visit. Allergies   Allergen Reactions    Pneumococcal Vaccines Other (See Comments)     Large local reaction    Prednisone Palpitations     dizziness     Social History     Socioeconomic History    Marital status: Single     Spouse name: Not on file    Number of children: Not on file    Years of education: Not on file    Highest education level: Not on file   Occupational History    Not on file   Social Needs    Financial resource strain: Not on file    Food insecurity     Worry: Not on file     Inability: Not on file    Transportation needs     Medical: Not on file     Non-medical: Not on file   Tobacco Use    Smoking status: Former Smoker     Packs/day: 1.00     Years: 10.00     Pack years: 10.00     Last attempt to quit: 1983     Years since quittin.7    Smokeless tobacco: Never Used   Substance and Sexual Activity    Alcohol use:  Yes     Alcohol/week: 0.0 standard drinks     Comment: 0-4 glasses of wine per month    Drug use: No    Sexual activity: Not Currently   Lifestyle    Physical activity     Days per week: Not on file     Minutes per session: Not on file    Stress: Not on file   Relationships    Social connections     Talks on phone: Not on file     Gets together: Not on file     Attends Bahai service: Not on file     Active member of club or organization: Not on file     Attends meetings of clubs or organizations: Not on file     Relationship status: Not on file    Intimate partner violence     Fear of current or ex partner: Not on file     Emotionally abused: Not on file     Physically abused: Not on file     Forced sexual activity: Not on file   Other Topics Concern    Not on file   Social History Narrative    Not on file     Family History   Problem Relation Age of Onset    High Cholesterol Mother    [employer] Other Father colon polyps    Colon Cancer Father     Dementia Father     Other Sister         gerd       Physical Exam  Constitutional: Normal nutritional status  Mental Status: Alert and oriented  Skin: No rashes or erythema  Lymphatic: No lymphadenopathy    Hand Examination: Examination of the left thumb shows good healing of her incision. Range of motion is listed in her last therapy note good good IP motion good CMC motion still could improved significantly at her MP joint. PA lateral oblique x-rays of the right wrist show the impacted distal radius fracture still has only about 5 degrees of dorsal tilt. There is been no increased widening of the scapholunate interval, she has had a significant DISI deformity of the lunate throughout all of her radiographs    Additional Comments:     Additional Examinations:  X-Ray Findings: PA lateral oblique x-rays of the right wrist show the impacted distal radius fracture still only has about 5 degrees of dorsal tilt. There is widening of the scapholunate interval but no increase. She has had a significant DC deformity of the lunate throughout all of her radiographs. PA lateral and oblique x-rays of the left hand show good position of the hardware from the open reduction internal fixation of her Guzmán's fracture  Additional Diagnostic Test Findings:    Office Procedures: Cast removed from the right wrist today. We placed her in a removable wrist support and will start her range of motion    Time Statement: This dictation was performed with a verbal recognition program. It is possible that there are still dictated errors within this office note. All efforts were made to ensure that this office note is accurate.     Orders Placed This Encounter   Procedures    XR WRIST RIGHT (MIN 3 VIEWS)     Standing Status:   Future     Number of Occurrences:   1     Standing Expiration Date:   9/1/2021    XR HAND LEFT (MIN 3 VIEWS)     Standing Status:   Future     Number of Occurrences:   1     Standing Expiration Date:   9/1/2021     Order Specific Question:   Reason for exam:     Answer:   **INCLUDE ANTOINE VIEW**    Roberta Licea Titan Wrist Short Brace     Patient was prescribed a Roberta Licea Titan Wrist Orthosis. The right wrist will require stabilization / immobilization from this semi-rigid / rigid orthosis to improve their function. The orthosis will assist in protecting the affected area, provide functional support and facilitate healing. The patient was educated and fit by a healthcare professional with expert knowledge and specialization in brace application while under the direct supervision of the treating physician. Verbal and written instructions for the use of and application of this item were provided. They were instructed to contact the office immediately should the brace result in increased pain, decreased sensation, increased swelling or worsening of the condition. Attestation: I have reviewed the chief complaint and history of present illness (including ROS and PFSH) and vital documentation by my staff and I agree with their documentation and have added where applicable. 16

## 2020-11-21 ENCOUNTER — EMERGENCY (EMERGENCY)
Facility: HOSPITAL | Age: 85
LOS: 1 days | Discharge: HOME | End: 2020-11-21
Attending: EMERGENCY MEDICINE | Admitting: EMERGENCY MEDICINE
Payer: MEDICARE

## 2020-11-21 VITALS
OXYGEN SATURATION: 96 % | RESPIRATION RATE: 17 BRPM | DIASTOLIC BLOOD PRESSURE: 53 MMHG | SYSTOLIC BLOOD PRESSURE: 90 MMHG | HEART RATE: 104 BPM | TEMPERATURE: 98 F

## 2020-11-21 VITALS
DIASTOLIC BLOOD PRESSURE: 46 MMHG | TEMPERATURE: 98 F | HEART RATE: 105 BPM | HEIGHT: 59 IN | WEIGHT: 89.95 LBS | OXYGEN SATURATION: 94 % | SYSTOLIC BLOOD PRESSURE: 119 MMHG | RESPIRATION RATE: 18 BRPM

## 2020-11-21 DIAGNOSIS — Z95.2 PRESENCE OF PROSTHETIC HEART VALVE: Chronic | ICD-10-CM

## 2020-11-21 DIAGNOSIS — Z98.49 CATARACT EXTRACTION STATUS, UNSPECIFIED EYE: Chronic | ICD-10-CM

## 2020-11-21 DIAGNOSIS — Z96.7 PRESENCE OF OTHER BONE AND TENDON IMPLANTS: Chronic | ICD-10-CM

## 2020-11-21 DIAGNOSIS — Z98.89 OTHER SPECIFIED POSTPROCEDURAL STATES: Chronic | ICD-10-CM

## 2020-11-21 LAB
-  AMIKACIN: SIGNIFICANT CHANGE UP
-  AMOXICILLIN/CLAVULANIC ACID: SIGNIFICANT CHANGE UP
-  AMPICILLIN/SULBACTAM: SIGNIFICANT CHANGE UP
-  AMPICILLIN: SIGNIFICANT CHANGE UP
-  AZTREONAM: SIGNIFICANT CHANGE UP
-  CEFAZOLIN: SIGNIFICANT CHANGE UP
-  CEFEPIME: SIGNIFICANT CHANGE UP
-  CEFOXITIN: SIGNIFICANT CHANGE UP
-  CEFTRIAXONE: SIGNIFICANT CHANGE UP
-  CIPROFLOXACIN: SIGNIFICANT CHANGE UP
-  ERTAPENEM: SIGNIFICANT CHANGE UP
-  GENTAMICIN: SIGNIFICANT CHANGE UP
-  IMIPENEM: SIGNIFICANT CHANGE UP
-  LEVOFLOXACIN: SIGNIFICANT CHANGE UP
-  MEROPENEM: SIGNIFICANT CHANGE UP
-  NITROFURANTOIN: SIGNIFICANT CHANGE UP
-  PIPERACILLIN/TAZOBACTAM: SIGNIFICANT CHANGE UP
-  TIGECYCLINE: SIGNIFICANT CHANGE UP
-  TOBRAMYCIN: SIGNIFICANT CHANGE UP
-  TRIMETHOPRIM/SULFAMETHOXAZOLE: SIGNIFICANT CHANGE UP
ALBUMIN SERPL ELPH-MCNC: 3.4 G/DL — SIGNIFICANT CHANGE UP (ref 3.4–5)
ALP SERPL-CCNC: 87 U/L — SIGNIFICANT CHANGE UP (ref 40–120)
ALT FLD-CCNC: 35 U/L — SIGNIFICANT CHANGE UP (ref 12–42)
ANION GAP SERPL CALC-SCNC: 10 MMOL/L — SIGNIFICANT CHANGE UP (ref 9–16)
APPEARANCE UR: CLEAR — SIGNIFICANT CHANGE UP
AST SERPL-CCNC: 36 U/L — SIGNIFICANT CHANGE UP (ref 15–37)
BASOPHILS # BLD AUTO: 0.05 K/UL — SIGNIFICANT CHANGE UP (ref 0–0.2)
BASOPHILS NFR BLD AUTO: 0.7 % — SIGNIFICANT CHANGE UP (ref 0–2)
BILIRUB SERPL-MCNC: 0.4 MG/DL — SIGNIFICANT CHANGE UP (ref 0.2–1.2)
BILIRUB UR-MCNC: NEGATIVE — SIGNIFICANT CHANGE UP
BUN SERPL-MCNC: 24 MG/DL — HIGH (ref 7–23)
CALCIUM SERPL-MCNC: 9.2 MG/DL — SIGNIFICANT CHANGE UP (ref 8.5–10.5)
CHLORIDE SERPL-SCNC: 101 MMOL/L — SIGNIFICANT CHANGE UP (ref 96–108)
CO2 SERPL-SCNC: 29 MMOL/L — SIGNIFICANT CHANGE UP (ref 22–31)
COLOR SPEC: YELLOW — SIGNIFICANT CHANGE UP
CREAT SERPL-MCNC: 1.06 MG/DL — SIGNIFICANT CHANGE UP (ref 0.5–1.3)
CULTURE RESULTS: SIGNIFICANT CHANGE UP
DIFF PNL FLD: NEGATIVE — SIGNIFICANT CHANGE UP
EOSINOPHIL # BLD AUTO: 0.33 K/UL — SIGNIFICANT CHANGE UP (ref 0–0.5)
EOSINOPHIL NFR BLD AUTO: 4.9 % — SIGNIFICANT CHANGE UP (ref 0–6)
GLUCOSE SERPL-MCNC: 114 MG/DL — HIGH (ref 70–99)
GLUCOSE UR QL: NEGATIVE — SIGNIFICANT CHANGE UP
HCT VFR BLD CALC: 36.7 % — SIGNIFICANT CHANGE UP (ref 34.5–45)
HGB BLD-MCNC: 12.1 G/DL — SIGNIFICANT CHANGE UP (ref 11.5–15.5)
IMM GRANULOCYTES NFR BLD AUTO: 0.4 % — SIGNIFICANT CHANGE UP (ref 0–1.5)
KETONES UR-MCNC: ABNORMAL MG/DL
LACTATE SERPL-SCNC: 1 MMOL/L — SIGNIFICANT CHANGE UP (ref 0.4–2)
LEUKOCYTE ESTERASE UR-ACNC: NEGATIVE — SIGNIFICANT CHANGE UP
LYMPHOCYTES # BLD AUTO: 1.22 K/UL — SIGNIFICANT CHANGE UP (ref 1–3.3)
LYMPHOCYTES # BLD AUTO: 18.1 % — SIGNIFICANT CHANGE UP (ref 13–44)
MCHC RBC-ENTMCNC: 32.5 PG — SIGNIFICANT CHANGE UP (ref 27–34)
MCHC RBC-ENTMCNC: 33 GM/DL — SIGNIFICANT CHANGE UP (ref 32–36)
MCV RBC AUTO: 98.7 FL — SIGNIFICANT CHANGE UP (ref 80–100)
METHOD TYPE: SIGNIFICANT CHANGE UP
MONOCYTES # BLD AUTO: 0.45 K/UL — SIGNIFICANT CHANGE UP (ref 0–0.9)
MONOCYTES NFR BLD AUTO: 6.7 % — SIGNIFICANT CHANGE UP (ref 2–14)
NEUTROPHILS # BLD AUTO: 4.66 K/UL — SIGNIFICANT CHANGE UP (ref 1.8–7.4)
NEUTROPHILS NFR BLD AUTO: 69.2 % — SIGNIFICANT CHANGE UP (ref 43–77)
NITRITE UR-MCNC: NEGATIVE — SIGNIFICANT CHANGE UP
NRBC # BLD: 0 /100 WBCS — SIGNIFICANT CHANGE UP (ref 0–0)
ORGANISM # SPEC MICROSCOPIC CNT: SIGNIFICANT CHANGE UP
ORGANISM # SPEC MICROSCOPIC CNT: SIGNIFICANT CHANGE UP
PH UR: 5 — SIGNIFICANT CHANGE UP (ref 5–8)
PLATELET # BLD AUTO: 249 K/UL — SIGNIFICANT CHANGE UP (ref 150–400)
POTASSIUM SERPL-MCNC: 4.4 MMOL/L — SIGNIFICANT CHANGE UP (ref 3.5–5.3)
POTASSIUM SERPL-SCNC: 4.4 MMOL/L — SIGNIFICANT CHANGE UP (ref 3.5–5.3)
PROT SERPL-MCNC: 8.1 G/DL — SIGNIFICANT CHANGE UP (ref 6.4–8.2)
PROT UR-MCNC: NEGATIVE MG/DL — SIGNIFICANT CHANGE UP
RBC # BLD: 3.72 M/UL — LOW (ref 3.8–5.2)
RBC # FLD: 13.9 % — SIGNIFICANT CHANGE UP (ref 10.3–14.5)
SODIUM SERPL-SCNC: 140 MMOL/L — SIGNIFICANT CHANGE UP (ref 132–145)
SP GR SPEC: 1.01 — SIGNIFICANT CHANGE UP (ref 1–1.03)
SPECIMEN SOURCE: SIGNIFICANT CHANGE UP
UROBILINOGEN FLD QL: 0.2 E.U./DL — SIGNIFICANT CHANGE UP
WBC # BLD: 6.74 K/UL — SIGNIFICANT CHANGE UP (ref 3.8–10.5)
WBC # FLD AUTO: 6.74 K/UL — SIGNIFICANT CHANGE UP (ref 3.8–10.5)

## 2020-11-21 PROCEDURE — 99285 EMERGENCY DEPT VISIT HI MDM: CPT

## 2020-11-21 PROCEDURE — 74177 CT ABD & PELVIS W/CONTRAST: CPT | Mod: 26

## 2020-11-21 RX ORDER — CEFPODOXIME PROXETIL 100 MG
1 TABLET ORAL
Qty: 20 | Refills: 0
Start: 2020-11-21 | End: 2020-11-30

## 2020-11-21 RX ORDER — DOCUSATE SODIUM 100 MG
1 CAPSULE ORAL
Qty: 14 | Refills: 0
Start: 2020-11-21 | End: 2020-12-04

## 2020-11-21 NOTE — ED PROVIDER NOTE - PATIENT PORTAL LINK FT
You can access the FollowMyHealth Patient Portal offered by [facility] by registering at the following website: [email or web address]. By joining [employer]’s FollowMyHealth portal, you will also be able to view your health information using other applications (apps) compatible with our system.

## 2020-11-21 NOTE — ED ADULT TRIAGE NOTE - CHIEF COMPLAINT QUOTE
reports lower back pain when she woke up this morning with an urge to move her bowels, denies urinary symptoms- was recently seen here for fall this past thursday with Negative XRAYs.

## 2020-11-21 NOTE — ED PROVIDER NOTE - NEUROLOGICAL, MLM
Alert and oriented, no focal deficits, no motor or sensory deficits. Normal ambulation. Good strength, steady gait. 5/5 LE strength bilaterally. 2+ brisk bilateral patellar DTRs. No saddle anesthesia.

## 2020-11-21 NOTE — ED PROVIDER NOTE - PROGRESS NOTE DETAILS
results noted.  looks well, nad, ? msk pain.  no ev of cord compression, walking very well, no saddle anesthesia and nl rectal tone.  ? related to constipation.  no fecal impaction. will dc with stool softeners and outpt fu pcp.

## 2020-11-21 NOTE — ED PROVIDER NOTE - GASTROINTESTINAL, MLM
Mild tenderness to left mid and lower quadrants. No rebound or guarding. Abdomen soft. Rectal exam with normal rectal tone. Soft stool in vault. No e/o impaction. No masses or rectal tenderness.

## 2020-11-21 NOTE — ED PROVIDER NOTE - CLINICAL SUMMARY MEDICAL DECISION MAKING FREE TEXT BOX
89 y/o female with 1 day of left sacral and lumbar back pain s/p fall on Monday, but symptoms then improved and now are different. Appreciated prior visit and workup including evidence of Klebsiella UTI. Patient is afebrile and looks very good for her age. Ambulating without evidence of cord compression. Given persistent symptoms and history of blood thinner use, will re-image for delayed injury or bleed to retroperitoneal region and sacrum. In addition, workup for UTI vs pyelo vs urosepsis.   Plan: Labs, cultures, UA, and reassess.

## 2020-11-21 NOTE — ED PROVIDER NOTE - NSFOLLOWUPINSTRUCTIONS_ED_ALL_ED_FT
Constipation    Constipation is when a person has fewer than three bowel movements a week, has difficulty having a bowel movement, or has stools that are dry, hard, or larger than normal. Other symptoms can include abdominal pain or bloating. As people grow older, constipation is more common. A low-fiber diet, not taking in enough fluids, and taking certain medicines, including opioid painkillers, may make constipation worse. Treatment varies but may include dietary modifications (more fiber-rich foods), lifestyle modifications, and possible medications.     SEEK IMMEDIATE MEDICAL CARE IF YOU HAVE ANY OF THE FOLLOWING SYMPTOMS: bright red blood in your stool, constipation for longer than 4 days, abdominal or rectal pain, unexplained weight loss, or inability to pass gas.    Follow up with your PCP in 1 week.

## 2020-11-21 NOTE — ED POST DISCHARGE NOTE - DETAILS
already on macrobid which is sensitive. denies fever, weakness, dysuria.  advised follow up with urologist.

## 2020-11-21 NOTE — ED PROVIDER NOTE - CHPI ED SYMPTOMS NEG
no abdominal pain, no N/V, no fever, no chills, no chest pain, no SOB, no weakness, no saddle anesthesia, no urinary/bowel incontinence/no numbness

## 2020-11-22 LAB
CULTURE RESULTS: SIGNIFICANT CHANGE UP
SPECIMEN SOURCE: SIGNIFICANT CHANGE UP

## 2020-11-22 RX ORDER — DOCUSATE SODIUM 100 MG
2 CAPSULE ORAL
Qty: 20 | Refills: 0
Start: 2020-11-22 | End: 2020-11-26

## 2020-11-26 LAB
CULTURE RESULTS: SIGNIFICANT CHANGE UP
CULTURE RESULTS: SIGNIFICANT CHANGE UP
SPECIMEN SOURCE: SIGNIFICANT CHANGE UP
SPECIMEN SOURCE: SIGNIFICANT CHANGE UP

## 2021-03-30 ENCOUNTER — EMERGENCY (EMERGENCY)
Facility: HOSPITAL | Age: 86
LOS: 1 days | Discharge: HOME | End: 2021-03-30
Admitting: EMERGENCY MEDICINE
Payer: MEDICARE

## 2021-03-30 VITALS
RESPIRATION RATE: 18 BRPM | DIASTOLIC BLOOD PRESSURE: 66 MMHG | TEMPERATURE: 99 F | OXYGEN SATURATION: 95 % | HEART RATE: 70 BPM | SYSTOLIC BLOOD PRESSURE: 121 MMHG

## 2021-03-30 VITALS
TEMPERATURE: 99 F | HEART RATE: 78 BPM | RESPIRATION RATE: 18 BRPM | OXYGEN SATURATION: 95 % | SYSTOLIC BLOOD PRESSURE: 146 MMHG | HEIGHT: 59 IN | DIASTOLIC BLOOD PRESSURE: 60 MMHG

## 2021-03-30 DIAGNOSIS — Z79.899 OTHER LONG TERM (CURRENT) DRUG THERAPY: ICD-10-CM

## 2021-03-30 DIAGNOSIS — I10 ESSENTIAL (PRIMARY) HYPERTENSION: ICD-10-CM

## 2021-03-30 DIAGNOSIS — Z79.82 LONG TERM (CURRENT) USE OF ASPIRIN: ICD-10-CM

## 2021-03-30 DIAGNOSIS — Z88.6 ALLERGY STATUS TO ANALGESIC AGENT: ICD-10-CM

## 2021-03-30 DIAGNOSIS — Z98.89 OTHER SPECIFIED POSTPROCEDURAL STATES: Chronic | ICD-10-CM

## 2021-03-30 DIAGNOSIS — E03.9 HYPOTHYROIDISM, UNSPECIFIED: ICD-10-CM

## 2021-03-30 DIAGNOSIS — S20.411A ABRASION OF RIGHT BACK WALL OF THORAX, INITIAL ENCOUNTER: ICD-10-CM
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
## 2021-03-30 DIAGNOSIS — Z96.7 PRESENCE OF OTHER BONE AND TENDON IMPLANTS: Chronic | ICD-10-CM

## 2021-03-30 DIAGNOSIS — Z79.84 LONG TERM (CURRENT) USE OF ORAL HYPOGLYCEMIC DRUGS: ICD-10-CM

## 2021-03-30 DIAGNOSIS — Z79.01 LONG TERM (CURRENT) USE OF ANTICOAGULANTS: ICD-10-CM

## 2021-03-30 DIAGNOSIS — Z98.49 CATARACT EXTRACTION STATUS, UNSPECIFIED EYE: Chronic | ICD-10-CM

## 2021-03-30 DIAGNOSIS — S22.41XA MULTIPLE FRACTURES OF RIBS, RIGHT SIDE, INITIAL ENCOUNTER FOR CLOSED FRACTURE: ICD-10-CM

## 2021-03-30 DIAGNOSIS — Z79.52 LONG TERM (CURRENT) USE OF SYSTEMIC STEROIDS: ICD-10-CM
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
## 2021-03-30 DIAGNOSIS — Z88.5 ALLERGY STATUS TO NARCOTIC AGENT: ICD-10-CM

## 2021-03-30 DIAGNOSIS — Z95.2 PRESENCE OF PROSTHETIC HEART VALVE: Chronic | ICD-10-CM

## 2021-03-30 PROCEDURE — 99284 EMERGENCY DEPT VISIT MOD MDM: CPT

## 2021-03-30 PROCEDURE — 71250 CT THORAX DX C-: CPT | Mod: 26

## 2021-03-30 RX ORDER — TRAMADOL HYDROCHLORIDE 50 MG/1
50 TABLET ORAL ONCE
Refills: 0 | Status: DISCONTINUED | OUTPATIENT
Start: 2021-03-30 | End: 2021-03-30

## 2021-03-30 RX ORDER — TRAMADOL HYDROCHLORIDE 50 MG/1
1 TABLET ORAL
Qty: 15 | Refills: 0
Start: 2021-03-30

## 2021-03-30 RX ADMIN — TRAMADOL HYDROCHLORIDE 50 MILLIGRAM(S): 50 TABLET ORAL at 08:47

## 2021-03-30 NOTE — ED PROVIDER NOTE - NSFOLLOWUPINSTRUCTIONS_ED_ALL_ED_FT
Rib Fracture(s)    A rib fracture is a break or crack in one of the bones of the ribs. The ribs are a group of long, curved bones that wrap around your chest and attach to your spine. A broken or cracked rib is often painful, but most do not cause other problems and heal on their own over time. Symptoms include pain when you breath or cough or pain when pressing over the area. Breathing exercises will help keep your lungs inflated and prevent lung collapse or infection.    Take pain medications as prescribed  Incentive spirometry as discussed   You have a lung nodule on your CT, please follow up with your doctor    SEEK IMMEDIATE MEDICAL CARE IF YOU HAVE ANY OF THE FOLLOWING SYMPTOMS: fever, shortness of breath, coughing up blood, abdominal pain, nausea or vomiting, pain not controlled with medications.

## 2021-03-30 NOTE — ED PROVIDER NOTE - PATIENT PORTAL LINK FT
You can access the FollowMyHealth Patient Portal offered by [facility] by registering at the following website: [email or web address]. By joining [employer]’s FollowMyHealth portal, you will also be able to view your health information using other applications (apps) compatible with our system.

## 2021-03-30 NOTE — ED ADULT NURSE NOTE - CHIEF COMPLAINT QUOTE
walk in pt with complaints of loss of balance and fall at home this am at approx 330a.  Reports she fell on top of a plastic filing bin and it broke under her. Patient reports she now has intense rib pain especially when taking a deep breath. Abrasion noted to area. Endorses she has a pacemaker and is on eliquis. Denies head strike. No pain meds PTA. Pt concerned for broken rib.

## 2021-03-30 NOTE — ED ADULT NURSE NOTE - OBJECTIVE STATEMENT
pt aox3 on arrival. Pt states slip and fall this am. landed on right ribs but denies head strike or loc. does take eliquis for afib, small abrasion to right mid ribs

## 2021-03-30 NOTE — ED PROVIDER NOTE - CLINICAL SUMMARY MEDICAL DECISION MAKING FREE TEXT BOX
s/p mechanical fall at home, lives alone and very independent, c/o right posterior rib cage pain, well appearing, NAD, no head trauma or LOC. found to have fractures of right seventh and eighth ribs. No pneumothorax or hemothorax. pain controlled, 02 sat normal, patient with many drug allergies, states she is able to take tramadol. patient offered admission for obs for rib fractures/pain control however patient states she wants to go home, will rx tramadol, incentive spirometry, return precautions discussed. has neighbors/friends who check up on her. incidental finding solid 6 mm nodule left upper lobe, discussed this with patient and she will follow up with her pmd, all results provided to patient, all questions answered, will dc.

## 2021-03-30 NOTE — ED PROVIDER NOTE - PHYSICAL EXAMINATION
CONSTITUTIONAL: disheveled, poor hygiene; in no apparent distress.   	HEAD: Normocephalic; atraumatic.   	EYES:  conjunctiva and sclera clear  	ENT: normal nose; no rhinorrhea; normal pharynx with no erythema or lesions.   	NECK: Supple; non-tender;   	CARDIOVASCULAR: Normal S1, S2; no murmurs, rubs, or gallops. Regular rate and rhythm.  superficial abrasion to right lower posterior chest with ttp. no flail chest.  	RESPIRATORY: Breathing easily; breath sounds clear and equal bilaterally; no wheezes, rhonchi, or rales.  	GI: Soft; non-distended; non-tender  	EXT: No cyanosis or edema; N/V intact  	SKIN: Normal for age and race; warm; dry; good turgor; no apparent lesions or rash.   	NEURO: A & O x 3; face symmetric; grossly unremarkable.   PSYCHOLOGICAL: The patient’s mood and manner are appropriate. CONSTITUTIONAL: in no apparent distress.   	HEAD: Normocephalic; atraumatic.   	EYES:  conjunctiva and sclera clear  	ENT: normal nose; no rhinorrhea; normal pharynx with no erythema or lesions.   	NECK: Supple; non-tender;   	CARDIOVASCULAR: Normal S1, S2; no murmurs, rubs, or gallops. Regular rate and rhythm.  superficial abrasion to right lower posterior chest with ttp. no flail chest.  	RESPIRATORY: Breathing easily; breath sounds clear and equal bilaterally; no wheezes, rhonchi, or rales.  	GI: Soft; non-distended; non-tender  	EXT: No cyanosis or edema; N/V intact  	SKIN: Normal for age and race; warm; dry; good turgor; no apparent lesions or rash.   	NEURO: A & O x 3; face symmetric; grossly unremarkable.   PSYCHOLOGICAL: The patient’s mood and manner are appropriate.

## 2021-05-04 NOTE — ED POST DISCHARGE NOTE - RESULT SUMMARY
patient requested for ED provider note to be changed after receiving her chart -- she requested "disheveled, poor hygiene" to be removed from the  physical exam of previous ED provider note as she states this is "defamation of character"

## 2021-05-16 ENCOUNTER — EMERGENCY (EMERGENCY)
Facility: HOSPITAL | Age: 86
LOS: 1 days | Discharge: HOME | End: 2021-05-16
Admitting: EMERGENCY MEDICINE
Payer: MEDICARE

## 2021-05-16 VITALS
SYSTOLIC BLOOD PRESSURE: 133 MMHG | RESPIRATION RATE: 18 BRPM | WEIGHT: 117.95 LBS | DIASTOLIC BLOOD PRESSURE: 49 MMHG | OXYGEN SATURATION: 99 % | HEART RATE: 86 BPM | TEMPERATURE: 98 F | HEIGHT: 59 IN

## 2021-05-16 DIAGNOSIS — Z95.2 PRESENCE OF PROSTHETIC HEART VALVE: Chronic | ICD-10-CM

## 2021-05-16 DIAGNOSIS — Z88.5 ALLERGY STATUS TO NARCOTIC AGENT: ICD-10-CM

## 2021-05-16 DIAGNOSIS — Z98.89 OTHER SPECIFIED POSTPROCEDURAL STATES: Chronic | ICD-10-CM

## 2021-05-16 DIAGNOSIS — Z96.7 PRESENCE OF OTHER BONE AND TENDON IMPLANTS: Chronic | ICD-10-CM

## 2021-05-16 DIAGNOSIS — E03.9 HYPOTHYROIDISM, UNSPECIFIED: ICD-10-CM

## 2021-05-16 DIAGNOSIS — M25.431 EFFUSION, RIGHT WRIST: ICD-10-CM

## 2021-05-16 DIAGNOSIS — E11.9 TYPE 2 DIABETES MELLITUS WITHOUT COMPLICATIONS: ICD-10-CM

## 2021-05-16 DIAGNOSIS — Z98.49 CATARACT EXTRACTION STATUS, UNSPECIFIED EYE: Chronic | ICD-10-CM

## 2021-05-16 DIAGNOSIS — I10 ESSENTIAL (PRIMARY) HYPERTENSION: ICD-10-CM

## 2021-05-16 DIAGNOSIS — Z88.6 ALLERGY STATUS TO ANALGESIC AGENT: ICD-10-CM

## 2021-05-16 DIAGNOSIS — M79.89 OTHER SPECIFIED SOFT TISSUE DISORDERS: ICD-10-CM

## 2021-05-16 LAB
ALBUMIN SERPL ELPH-MCNC: 3.3 G/DL — LOW (ref 3.4–5)
ALP SERPL-CCNC: 67 U/L — SIGNIFICANT CHANGE UP (ref 40–120)
ALT FLD-CCNC: 21 U/L — SIGNIFICANT CHANGE UP (ref 12–42)
ANION GAP SERPL CALC-SCNC: 5 MMOL/L — LOW (ref 9–16)
APTT BLD: 37.3 SEC — HIGH (ref 27.5–35.5)
AST SERPL-CCNC: 19 U/L — SIGNIFICANT CHANGE UP (ref 15–37)
BASOPHILS # BLD AUTO: 0.04 K/UL — SIGNIFICANT CHANGE UP (ref 0–0.2)
BASOPHILS NFR BLD AUTO: 0.6 % — SIGNIFICANT CHANGE UP (ref 0–2)
BILIRUB SERPL-MCNC: 0.3 MG/DL — SIGNIFICANT CHANGE UP (ref 0.2–1.2)
BUN SERPL-MCNC: 24 MG/DL — HIGH (ref 7–23)
CALCIUM SERPL-MCNC: 9.3 MG/DL — SIGNIFICANT CHANGE UP (ref 8.5–10.5)
CHLORIDE SERPL-SCNC: 104 MMOL/L — SIGNIFICANT CHANGE UP (ref 96–108)
CO2 SERPL-SCNC: 33 MMOL/L — HIGH (ref 22–31)
CREAT SERPL-MCNC: 1.07 MG/DL — SIGNIFICANT CHANGE UP (ref 0.5–1.3)
CRP SERPL-MCNC: 7 MG/L — SIGNIFICANT CHANGE UP (ref 0–9)
EOSINOPHIL # BLD AUTO: 0.17 K/UL — SIGNIFICANT CHANGE UP (ref 0–0.5)
EOSINOPHIL NFR BLD AUTO: 2.7 % — SIGNIFICANT CHANGE UP (ref 0–6)
ERYTHROCYTE [SEDIMENTATION RATE] IN BLOOD: 38 MM/HR — HIGH (ref 0–20)
GLUCOSE SERPL-MCNC: 102 MG/DL — HIGH (ref 70–99)
HCT VFR BLD CALC: 34.8 % — SIGNIFICANT CHANGE UP (ref 34.5–45)
HGB BLD-MCNC: 11.3 G/DL — LOW (ref 11.5–15.5)
IMM GRANULOCYTES NFR BLD AUTO: 0.5 % — SIGNIFICANT CHANGE UP (ref 0–1.5)
INR BLD: 1.12 — SIGNIFICANT CHANGE UP (ref 0.88–1.16)
LYMPHOCYTES # BLD AUTO: 1.49 K/UL — SIGNIFICANT CHANGE UP (ref 1–3.3)
LYMPHOCYTES # BLD AUTO: 23.9 % — SIGNIFICANT CHANGE UP (ref 13–44)
MCHC RBC-ENTMCNC: 30.7 PG — SIGNIFICANT CHANGE UP (ref 27–34)
MCHC RBC-ENTMCNC: 32.5 GM/DL — SIGNIFICANT CHANGE UP (ref 32–36)
MCV RBC AUTO: 94.6 FL — SIGNIFICANT CHANGE UP (ref 80–100)
MONOCYTES # BLD AUTO: 0.56 K/UL — SIGNIFICANT CHANGE UP (ref 0–0.9)
MONOCYTES NFR BLD AUTO: 9 % — SIGNIFICANT CHANGE UP (ref 2–14)
NEUTROPHILS # BLD AUTO: 3.94 K/UL — SIGNIFICANT CHANGE UP (ref 1.8–7.4)
NEUTROPHILS NFR BLD AUTO: 63.3 % — SIGNIFICANT CHANGE UP (ref 43–77)
NRBC # BLD: 0 /100 WBCS — SIGNIFICANT CHANGE UP (ref 0–0)
PLATELET # BLD AUTO: 246 K/UL — SIGNIFICANT CHANGE UP (ref 150–400)
POTASSIUM SERPL-MCNC: 4.1 MMOL/L — SIGNIFICANT CHANGE UP (ref 3.5–5.3)
POTASSIUM SERPL-SCNC: 4.1 MMOL/L — SIGNIFICANT CHANGE UP (ref 3.5–5.3)
PROT SERPL-MCNC: 7.4 G/DL — SIGNIFICANT CHANGE UP (ref 6.4–8.2)
PROTHROM AB SERPL-ACNC: 13.4 SEC — SIGNIFICANT CHANGE UP (ref 10.6–13.6)
RBC # BLD: 3.68 M/UL — LOW (ref 3.8–5.2)
RBC # FLD: 15 % — HIGH (ref 10.3–14.5)
SODIUM SERPL-SCNC: 142 MMOL/L — SIGNIFICANT CHANGE UP (ref 132–145)
WBC # BLD: 6.23 K/UL — SIGNIFICANT CHANGE UP (ref 3.8–10.5)
WBC # FLD AUTO: 6.23 K/UL — SIGNIFICANT CHANGE UP (ref 3.8–10.5)

## 2021-05-16 PROCEDURE — 99284 EMERGENCY DEPT VISIT MOD MDM: CPT | Mod: 25

## 2021-05-16 PROCEDURE — 73130 X-RAY EXAM OF HAND: CPT | Mod: 26,RT

## 2021-05-16 PROCEDURE — 73110 X-RAY EXAM OF WRIST: CPT | Mod: 26,RT

## 2021-05-16 RX ORDER — CEPHALEXIN 500 MG
1 CAPSULE ORAL
Qty: 28 | Refills: 0
Start: 2021-05-16 | End: 2021-05-22

## 2021-05-16 NOTE — ED PROVIDER NOTE - NSFOLLOWUPINSTRUCTIONS_ED_ALL_ED_FT
What can cause hand pain?  Different conditions can cause hand pain, including:    ?Osteoarthritis – Arthritis is a general term that means inflammation of the joints. There are different types of arthritis. The most common type, called osteoarthritis, often comes with age. It can cause pain, stiffness, and swelling in the finger joints.    ?Rheumatoid arthritis – Rheumatoid arthritis is another type of arthritis. It can also cause pain, stiffness, and swelling in the finger joints (picture 1). The stiffness is usually worst in the morning.    ?Trigger finger – This condition keeps a finger from straightening normally. When people try to straighten their trigger finger, the finger "locks" or "catches" in a bent position (picture 2). Trigger finger can also cause pain in the finger or palm. Trigger finger is caused by a problem with a tendon. Tendons are strong bands of tissue that connect muscles to bones.    ?Cysts – A cyst is an abnormal fluid-filled sac. If a cyst forms on or next to a tendon, it can cause a painful lump in the palm of the hand. Cysts can also form on joints (picture 3).    ?Dupuytren's contracture – This condition causes the tissue under the skin on the palm to get thick. Over time, this makes the fingers (usually the ring and little fingers) stiff and keeps them from straightening all the way.    ?Mallet finger – Mallet finger can happen when the finger joint nearest the fingernail gets hurt. People usually have pain and swelling on top of that joint. They can also have trouble straightening that joint all the way.    ?Carpal tunnel syndrome – This condition affects a nerve that passes through the wrist to the hand. It usually causes pain, numbness, and tingling in the thumb, index finger, middle finger, and side of the ring finger. People can also have pain in their arm.    Is there anything I can do on my own to feel better?  Yes. To ease your symptoms, you can:    ?Rest your hand – Don't  things too tightly or too often.    ?Put heat on the area to reduce pain and stiffness – Don't use heat for more than 20 minutes at a time. Don't use anything too hot that could burn your skin.    ?Do gentle exercises – Close your fingers to make a fist. Then straighten your fingers all the way.    ?Take medicine to reduce pain and swelling – To treat pain, you can take acetaminophen (sample brand name: Tylenol). To treat pain and swelling, you can take ibuprofen (sample brand names: Advil, Motrin).    To keep your hands from getting hurt:    ?Wear thick leather gloves when you do work that could hurt your hands    ?Do not let your hands get too cold for too long    Should I see a doctor or nurse?  You should see a doctor or nurse if:    ?Your symptoms do not get better or get worse after you treat them on your own for a few days.    ?Your hand is weak.    ?You can't make a fist or straighten your fingers all the way.    Will I need tests?  Maybe. Your doctor or nurse will ask about your symptoms and do an exam. He or she will examine your hand and fingers carefully and see how they move and work.    Your doctor or nurse might do 1 or more of the following tests:

## 2021-05-16 NOTE — ED PROVIDER NOTE - OBJECTIVE STATEMENT
PMHX AF on eliquis, AS with repair, DM presents with pain over R 3rd and 4th fingers when she woke today. also noted swelling over wrist that she states is nonpainful. denies fever, chills, trauma. states that she feels tightness over hand when she attempts to make a fist. admits to chronic paresthesias over all extremities in which she is under the care of a neurologist. currently on 3 day coarse of macrobid for UTI. denies dysuria at this time. records show that she had an allergy to indocin but does not recall having a diagnosis of gout. PMHX AF on eliquis, AS with repair, DM presents with pain over R 3rd and 4th fingers when she woke today. also noted swelling over wrist that she states is nonpainful. denies fever, chills, trauma. states that she feels tightness over hand when she attempts to make a fist. admits to chronic paresthesias over all extremities in which she is under the care of a neurologist. currently on 3 day coarse of macrobid for UTI. denies dysuria at this time. records show that she had an allergy to indocin but states that she does not have a diagnosis of gout or arthritis. PCP Dr Whalen

## 2021-05-16 NOTE — ED PROVIDER NOTE - PROGRESS NOTE DETAILS
xray with DJD, no fx, labs WNL. ESR and CRP sent. advised elevation, ice, and follow up with Dr Whalen in one day. area likely inflammatory v hemamtoma does not appear to be infected at this time, rx sent for keflex. patient instructed to take keflex if swelling should worsened.

## 2021-05-16 NOTE — ED PROVIDER NOTE - WR ORDER ID 1
Chief Complaint   Patient presents with   • Follow-Up     lab review       HISTORY OF THE PRESENT ILLNESS: This is a 26 y.o. male established patient who presents today for follow up on lab results and to discuss his medical problems.    Elevated hemoglobin A1c  Prediabetes  Chronic. Patient's A1C on last labs was 6.6, fasting glucose was 99. Patient is not currently on any medication for this. He does not report any active associated symptoms regarding this condition. He has strong family history of diabetes.     Essential hypertension  Chronic. At last visit, patient stated that he was put on lisinopril in the past for a period of time but stopped taking it. He was restarted on lisinopril which he has been taking. Except for a mild tolerable cough, he denies any other medication side effects or associated symptoms regarding hypertension. No reports of active chest pain, palpitations, shortness of breath, dizziness, headaches, or vision changes.     Mixed hyperlipidemia  Discussed last lab results which showed AST of 23, ALT of 52. Total cholesterol was 228. LDL was 151. HDL was 48. No reports of any active associated symptoms regarding hyperlipidemia. Patient is not currently taking any medication for this.    Past Medical History:   Diagnosis Date   • Hypertension        History reviewed. No pertinent surgical history.    Family Status   Relation Status   • Mo Alive   • Fa Alive   • Bro (Not Specified)     Family History   Problem Relation Age of Onset   • No Known Problems Mother    • Diabetes Father    • Diabetes Brother        Social History   Substance Use Topics   • Smoking status: Never Smoker   • Smokeless tobacco: Never Used   • Alcohol use Yes      Comment: occ        Allergies: Patient has no known allergies.    Current Outpatient Prescriptions Ordered in [facility]   Medication Sig Dispense Refill   • metFORMIN ER (GLUCOPHAGE XR) 500 MG TABLET SR 24 HR Take 1 Tab by mouth every day. 90 Tab 0   • lisinopril  "(PRINIVIL) 20 MG Tab Take 1 Tab by mouth every day. 30 Tab 0     No current Epic-ordered facility-administered medications on file.        Review of Systems   Constitutional: Negative for fever, chills, weight loss and malaise/fatigue.     Eyes: Negative for blurred vision.   Respiratory: Negative for cough, sputum production, shortness of breath and wheezing.    Cardiovascular: Negative for chest pain, palpitations, orthopnea and leg swelling.   Genitourinary: Negative for dysuria, urgency and frequency.   Neurological: Negative for dizziness, tingling, tremors, sensory change, focal weakness and headaches.   All other systems reviewed and are negative except as in HPI.    Exam: /68   Pulse 83   Temp 36.9 °C (98.4 °F)   Resp 16   Ht 1.88 m (6' 2\")   Wt 118.8 kg (262 lb)   SpO2 98%   General:  Normal appearing. No distress.  Pulmonary:  Clear to ausculation.  Normal effort. No rales, ronchi, or wheezing.  Cardiovascular:  Regular rate and rhythm without murmur. Carotid and radial pulses are intact and equal bilaterally.  Neurologic:  Grossly nonfocal  Skin:  Warm and dry.  No obvious lesions.  Musculoskeletal:  Normal gait. No extremity cyanosis, clubbing, or edema.  Psych:  Normal mood and affect. Alert and oriented x3. Judgment and insight is normal.    Labs:  Hospital Outpatient Visit on 05/13/2019   Component Date Value Ref Range Status   • WBC 05/13/2019 6.7  4.8 - 10.8 K/uL Final   • RBC 05/13/2019 5.55  4.70 - 6.10 M/uL Final   • Hemoglobin 05/13/2019 16.6  14.0 - 18.0 g/dL Final   • Hematocrit 05/13/2019 49.0  42.0 - 52.0 % Final   • MCV 05/13/2019 88.3  81.4 - 97.8 fL Final   • MCH 05/13/2019 29.9  27.0 - 33.0 pg Final   • MCHC 05/13/2019 33.9  33.7 - 35.3 g/dL Final   • RDW 05/13/2019 37.3  35.9 - 50.0 fL Final   • Platelet Count 05/13/2019 263  164 - 446 K/uL Final   • MPV 05/13/2019 11.5  9.0 - 12.9 fL Final   • Neutrophils-Polys 05/13/2019 55.10  44.00 - 72.00 % Final   • Lymphocytes " 05/13/2019 34.40  22.00 - 41.00 % Final   • Monocytes 05/13/2019 7.20  0.00 - 13.40 % Final   • Eosinophils 05/13/2019 1.70  0.00 - 6.90 % Final   • Basophils 05/13/2019 1.10  0.00 - 1.80 % Final   • Immature Granulocytes 05/13/2019 0.50  0.00 - 0.90 % Final   • Nucleated RBC 05/13/2019 0.00  /100 WBC Final   • Neutrophils (Absolute) 05/13/2019 3.68  1.82 - 7.42 K/uL Final    Includes immature neutrophils, if present.   • Lymphs (Absolute) 05/13/2019 2.29  1.00 - 4.80 K/uL Final   • Monos (Absolute) 05/13/2019 0.48  0.00 - 0.85 K/uL Final   • Eos (Absolute) 05/13/2019 0.11  0.00 - 0.51 K/uL Final   • Baso (Absolute) 05/13/2019 0.07  0.00 - 0.12 K/uL Final   • Immature Granulocytes (abs) 05/13/2019 0.03  0.00 - 0.11 K/uL Final   • NRBC (Absolute) 05/13/2019 0.00  K/uL Final   • Sodium 05/13/2019 140  135 - 145 mmol/L Final   • Potassium 05/13/2019 3.9  3.6 - 5.5 mmol/L Final   • Chloride 05/13/2019 104  96 - 112 mmol/L Final   • Co2 05/13/2019 26  20 - 33 mmol/L Final   • Anion Gap 05/13/2019 10.0  0.0 - 11.9 Final   • Glucose 05/13/2019 99  65 - 99 mg/dL Final   • Bun 05/13/2019 11  8 - 22 mg/dL Final   • Creatinine 05/13/2019 0.74  0.50 - 1.40 mg/dL Final   • Calcium 05/13/2019 9.6  8.5 - 10.5 mg/dL Final   • AST(SGOT) 05/13/2019 23  12 - 45 U/L Final   • ALT(SGPT) 05/13/2019 52* 2 - 50 U/L Final   • Alkaline Phosphatase 05/13/2019 54  30 - 99 U/L Final   • Total Bilirubin 05/13/2019 0.6  0.1 - 1.5 mg/dL Final   • Albumin 05/13/2019 4.3  3.2 - 4.9 g/dL Final   • Total Protein 05/13/2019 7.7  6.0 - 8.2 g/dL Final   • Globulin 05/13/2019 3.4  1.9 - 3.5 g/dL Final   • A-G Ratio 05/13/2019 1.3  g/dL Final   • Cholesterol,Tot 05/13/2019 228* 100 - 199 mg/dL Final   • Triglycerides 05/13/2019 146  0 - 149 mg/dL Final   • HDL 05/13/2019 48  >=40 mg/dL Final   • LDL 05/13/2019 151* <100 mg/dL Final   • Glycohemoglobin 05/13/2019 6.6* 0.0 - 5.6 % Final    Comment: Increased risk for diabetes:  5.7 -6.4%  Diabetes:   >6.4%  Glycemic control for adults with diabetes:  <7.0%  The above interpretations are per ADA guidelines.  Diagnosis  of diabetes mellitus on the basis of elevated Hemoglobin A1c  should be confirmed by repeating the Hb A1c test.     • Est Avg Glucose 05/13/2019 143  mg/dL Final    Comment: The eAG calculation is based on the A1c-Derived Daily Glucose  (ADAG) study.  See the ADA's website for additional information.     • TSH 05/13/2019 2.030  0.380 - 5.330 uIU/mL Final    Comment: Please note new reference ranges effective 12/14/2017 10:00 AM  Pregnant Females, 1st Trimester  0.050-3.700  Pregnant Females, 2nd Trimester  0.310-4.350  Pregnant Females, 3rd Trimester  0.410-5.180     • Fasting Status 05/13/2019 Fasting   Final   • GFR If  05/13/2019 >60  >60 mL/min/1.73 m 2 Final   • GFR If Non  05/13/2019 >60  >60 mL/min/1.73 m 2 Final          PLAN:    1. Elevated hemoglobin A1c  - Same as #3 below.  - POCT  A1C    2. Essential hypertension  - Well-controlled and stable on lisinopril. Continue current plan of care and medications.   - Counseled patient on diet and exercise. Advised low sodium diet.     3. Prediabetes  - Last labs showed A1C of 6.6. A1C in clinic today was 6.2. Informed patient this does not indicate diabetes, but it does indicate increased risk for diabetes. Discussed plan of care options. Given his family history of diabetes and A1C of 6.2, we can start medication before he reaches the diabetic marimar. Also gave the option to wait and have patient improve his diet and exercise and then recheck A1C at next visit. He would like to start medication. Prescribing Metformin 500 mg once daily. Reviewed the risks and benefits as well as potential side effects of metformin with patient including upset stomach. Informed him he can start off by taking 250 mg for the first few days and then take the full 500 mg when he feels comfortable doing so. Patient will follow up in 3  months to recheck A1C.  - Counseled patient on diet and exercise. Advised decreasing intake of carbohydrates and sugary foods and increasing intake of vegetables.   - metFORMIN ER (GLUCOPHAGE XR) 500 MG TABLET SR 24 HR; Take 1 Tab by mouth every day.  Dispense: 90 Tab; Refill: 0     4. Mixed hyperlipidemia  - Discussed last labs with patient which showed total cholesterol mildly elevated at 228. LDL was mildly elevated at 151. HDL is normal at 48. AST was 23. ALT slightly elevated at 52.  - Patient will manage this with healthy diet and exercise. Advised to eat low fat, low carbohydrate and high fiber diet as well as do physical exercise regularly.      Patient will follow up in 3 months. He is encouraged to be seen in the emergency room for chest pain, palpitations, shortness of breath, dizziness, severe abdominal pain or other concerning symptoms.     Please note that this dictation was created using voice recognition software. I have made every reasonable attempt to correct obvious errors, but I expect that there are errors of grammar and possibly content that I did not discover before finalizing the note.      Assessment/Plan  1. Elevated hemoglobin A1c  POCT  A1C   2. Essential hypertension     3. Prediabetes  metFORMIN ER (GLUCOPHAGE XR) 500 MG TABLET SR 24 HR   4. Mixed hyperlipidemia           I have placed the below orders and discussed them with an approved delegating provider. The MA is performing the below orders under the direction of Dr. Dalton.       Josiah VASQUEZ (Scribe), am scribing for, and in the presence of, TRUONG Leslie    Electronically signed by: Josiah Billings (Mehrdad), 5/15/2019    Bharat VASQUEZ APRN personally performed the services described in this documentation, as scribed by Josiah Billings in my presence, and it is both accurate and complete.     [id number]

## 2021-05-16 NOTE — ED ADULT NURSE NOTE - NSIMPLEMENTINTERV_GEN_ALL_ED
Implemented All Universal Safety Interventions:  [place] to call system. Call bell, personal items and telephone within reach. Instruct patient to call for assistance. Room bathroom lighting operational. Non-slip footwear when patient is off stretcher. Physically safe environment: no spills, clutter or unnecessary equipment. Stretcher in lowest position, wheels locked, appropriate side rails in place.

## 2021-05-16 NOTE — ED PROVIDER NOTE - CLINICAL SUMMARY MEDICAL DECISION MAKING FREE TEXT BOX
PMHX HTN, DM, AF, AV repair on eliquis presents with atraumatic swelling over R wrist and pain and stiffness over R 3rd and 4th fingers. will check labs, do xray.

## 2021-05-16 NOTE — ED ADULT TRIAGE NOTE - CHIEF COMPLAINT QUOTE
pt c/o right wrist pain and swelling, denies injury, states she is on "a bloodthinner" cannot recall name. no discoloration

## 2021-05-16 NOTE — ED PROVIDER NOTE - PATIENT PORTAL LINK FT
You can access the FollowMyHealth Patient Portal offered by [facility] by registering at the following website: [email or web address]. By joining [employer]’s FollowMyHealth portal, you will also be able to view your health information using other applications (apps) compatible with our system.

## 2021-05-16 NOTE — ED PROVIDER NOTE - PHYSICAL EXAMINATION
RUE: 2.5x 2.5 area of edema over palmar aspect of wrist, TTP, no surrounding edema or erythema, no ecchymosis. no edema or tenderness over fingers. cap refill <2sec, FROM

## 2021-06-09 NOTE — ED PROVIDER NOTE - OBJECTIVE STATEMENT
89 y/o female with PMHx of aortic stenosis, HTN, hypothyroid, osteoporosis, scoliosis, SVT, with +pacemaker, and on Eliquis BID presents to the ED lower back pain after waking up this morning with the urge to move her bowels. Last BM was yesterday. Prior chart review shows that Patient was recently seen at [facility] on Nov 18 with similar complaint and had negative imaging. Patient's urine culture was found to have +Klebsiella for which Patient was already placed on Macrobid for. Endorses being compliant with antibiotic but forgot to take her dose this morning. Of note, Patient endorses being followed by a urologist and saw him on Nov 10. Denies fever, chills, chest pain, SOB, abdominal pain, N/V, numbness, weakness, saddle anesthesia, urinary/bowel incontinence, dysuria, hematuria. [Negative] : Genitourinary 89 y/o female with PMHx of aortic stenosis, HTN, hypothyroid, osteoporosis, scoliosis, SVT, with +pacemaker, and on Eliquis BID presents to the ED lower back pain after waking up this morning with the urge to move her bowels. Last BM was yesterday. Prior chart review shows that Patient was recently seen at [facility] on Nov 18 with complaint of fall to buttock and had negative imaging. Patient's urine culture was found to have +Klebsiella for which Patient was already placed on Macrobid for. Endorses being compliant with antibiotic but forgot to take her dose this morning. Of note, Patient endorses being followed by a urologist and saw him on Nov 10. Denies fever, chills, chest pain, SOB, abdominal pain, N/V, numbness, weakness, saddle anesthesia, urinary/bowel incontinence, dysuria, hematuria.

## 2021-07-06 ENCOUNTER — EMERGENCY (EMERGENCY)
Facility: HOSPITAL | Age: 86
LOS: 1 days | Discharge: HOME | End: 2021-07-06
Admitting: EMERGENCY MEDICINE
Payer: MEDICARE

## 2021-07-06 VITALS
TEMPERATURE: 98 F | SYSTOLIC BLOOD PRESSURE: 106 MMHG | RESPIRATION RATE: 16 BRPM | WEIGHT: 100.09 LBS | HEART RATE: 80 BPM | OXYGEN SATURATION: 93 % | HEIGHT: 59 IN | DIASTOLIC BLOOD PRESSURE: 56 MMHG

## 2021-07-06 VITALS
RESPIRATION RATE: 17 BRPM | DIASTOLIC BLOOD PRESSURE: 67 MMHG | SYSTOLIC BLOOD PRESSURE: 123 MMHG | OXYGEN SATURATION: 95 % | TEMPERATURE: 99 F | HEART RATE: 69 BPM

## 2021-07-06 DIAGNOSIS — Z79.84 LONG TERM (CURRENT) USE OF ORAL HYPOGLYCEMIC DRUGS: ICD-10-CM

## 2021-07-06 DIAGNOSIS — Z79.82 LONG TERM (CURRENT) USE OF ASPIRIN: ICD-10-CM

## 2021-07-06 DIAGNOSIS — M54.2 CERVICALGIA: ICD-10-CM

## 2021-07-06 DIAGNOSIS — Z96.7 PRESENCE OF OTHER BONE AND TENDON IMPLANTS: Chronic | ICD-10-CM

## 2021-07-06 DIAGNOSIS — Z96.7 PRESENCE OF OTHER BONE AND TENDON IMPLANTS: ICD-10-CM

## 2021-07-06 DIAGNOSIS — E03.9 HYPOTHYROIDISM, UNSPECIFIED: ICD-10-CM

## 2021-07-06 DIAGNOSIS — Z98.890 OTHER SPECIFIED POSTPROCEDURAL STATES: ICD-10-CM

## 2021-07-06 DIAGNOSIS — R51.9 HEADACHE, UNSPECIFIED: ICD-10-CM

## 2021-07-06 DIAGNOSIS — Z79.01 LONG TERM (CURRENT) USE OF ANTICOAGULANTS: ICD-10-CM

## 2021-07-06 DIAGNOSIS — Z88.5 ALLERGY STATUS TO NARCOTIC AGENT: ICD-10-CM

## 2021-07-06 DIAGNOSIS — Z98.49 CATARACT EXTRACTION STATUS, UNSPECIFIED EYE: Chronic | ICD-10-CM

## 2021-07-06 DIAGNOSIS — Z95.2 PRESENCE OF PROSTHETIC HEART VALVE: ICD-10-CM

## 2021-07-06 DIAGNOSIS — R11.0 NAUSEA: ICD-10-CM

## 2021-07-06 DIAGNOSIS — Z95.2 PRESENCE OF PROSTHETIC HEART VALVE: Chronic | ICD-10-CM

## 2021-07-06 DIAGNOSIS — Z98.89 OTHER SPECIFIED POSTPROCEDURAL STATES: Chronic | ICD-10-CM

## 2021-07-06 DIAGNOSIS — Z88.6 ALLERGY STATUS TO ANALGESIC AGENT: ICD-10-CM

## 2021-07-06 DIAGNOSIS — M81.0 AGE-RELATED OSTEOPOROSIS WITHOUT CURRENT PATHOLOGICAL FRACTURE: ICD-10-CM

## 2021-07-06 DIAGNOSIS — I10 ESSENTIAL (PRIMARY) HYPERTENSION: ICD-10-CM

## 2021-07-06 PROCEDURE — 99283 EMERGENCY DEPT VISIT LOW MDM: CPT

## 2021-07-06 RX ORDER — METHOCARBAMOL 500 MG/1
2 TABLET, FILM COATED ORAL
Qty: 20 | Refills: 0
Start: 2021-07-06 | End: 2021-07-10

## 2021-07-06 RX ORDER — ACETAMINOPHEN 500 MG
650 TABLET ORAL ONCE
Refills: 0 | Status: COMPLETED | OUTPATIENT
Start: 2021-07-06 | End: 2021-07-06

## 2021-07-06 RX ORDER — LIDOCAINE 4 G/100G
1 CREAM TOPICAL ONCE
Refills: 0 | Status: COMPLETED | OUTPATIENT
Start: 2021-07-06 | End: 2021-07-06

## 2021-07-06 RX ORDER — METHOCARBAMOL 500 MG/1
1000 TABLET, FILM COATED ORAL ONCE
Refills: 0 | Status: COMPLETED | OUTPATIENT
Start: 2021-07-06 | End: 2021-07-06

## 2021-07-06 RX ADMIN — Medication 650 MILLIGRAM(S): at 16:39

## 2021-07-06 RX ADMIN — METHOCARBAMOL 1000 MILLIGRAM(S): 500 TABLET, FILM COATED ORAL at 16:39

## 2021-07-06 RX ADMIN — LIDOCAINE 1 PATCH: 4 CREAM TOPICAL at 16:38

## 2021-07-06 NOTE — ED PROVIDER NOTE - NSFOLLOWUPINSTRUCTIONS_ED_ALL_ED_FT
Neck/Back Pain    Neck/Back pain is very common in adults. The cause of neck/back pain is rarely dangerous and the pain often gets better over time. The cause of your neck/back pain may not be known and may include strain of muscles or ligaments, degeneration of the spinal disks, or arthritis. Occasionally the pain may radiate down your leg(s). Over-the-counter medicines to reduce pain and inflammation are often the most helpful. Stretching and remaining active frequently helps the healing process.     SEEK IMMEDIATE MEDICAL CARE IF YOU HAVE ANY OF THE FOLLOWING SYMPTOMS: bowel or bladder control problems, unusual weakness or numbness in your arms or legs, nausea or vomiting, abdominal pain, fever, dizziness/lightheadedness.      1. TYLENOL 650 mg morning and night   2. ROBAXIN 750-1500 mg 1, 2 or 3 times a day for muscle spasms  3. Over the counter SALONPAS SPRAY or LIDOCAINE patches 4%  4. Get a massage (or two)  5. Try hot compresses, showers and baths  6. In 1-2 weeks, start back pain stretches from [employer]  7. In 1-2 months, start back pain strengthening exercises from [employer] Or see your regular doctor to get a referral for PHYSICAL THERAPY  8. If pain persists for 6 weeks, see your doctor for x-rays  9. IF  you get fevers or neurologic deficits - decreased sensation, weakness, tingling in your arms or legs OR  trouble urinating, weight loss or night sweats, then RETURN to the ER or see your doctor ASAP

## 2021-07-06 NOTE — ED PROVIDER NOTE - PROGRESS NOTE DETAILS
Pt improved with some lingering pain but is asking to be discharged. Pt also notes being hungry. Will give crackers. Will reassess and discharge. pt notes feeling much improved. requesting discharge. will d/c with rx for robaxin.

## 2021-07-06 NOTE — ED PROVIDER NOTE - NEUROLOGICAL, MLM
Alert and oriented, no focal deficits, no motor or sensory deficits. Alert and oriented, no focal deficits, no motor or sensory deficits. normal FTN, no pronator drift, 5/5  strength equal bilat, normal sensation to bilat upper extremities

## 2021-07-06 NOTE — ED PROVIDER NOTE - CLINICAL SUMMARY MEDICAL DECISION MAKING FREE TEXT BOX
88 year old F with PMHx of aortic stenosis, aortic valve replaced, essential hypertension, hypothyroid, osteoporosis, and tibial fracture presents to ED complaining of neck pain and headache, especially when turning his head. Will provide muscle relaxer, Tylenol, and Lidocaine patch. Will monitor and reassess. 88 year old F with PMHx of aortic stenosis, aortic valve replaced, essential hypertension, hypothyroid, osteoporosis, and tibial fracture presents to ED complaining of exacerbation of chronic neck pain, especially when turning her head. Will provide muscle relaxer, Tylenol, and Lidocaine patch. Will monitor and reassess.

## 2021-07-06 NOTE — ED PROVIDER NOTE - MUSCULOSKELETAL, MLM
tenderness of bilateral trapezius distribution; no midline boniness; no stepoff tenderness tenderness of bilateral trapezius distribution; no midline vertebral tenderness, no stepoff tenderness

## 2021-07-06 NOTE — ED PROVIDER NOTE - PATIENT PORTAL LINK FT
You can access the FollowMyHealth Patient Portal offered by [facility] by registering at the following website: [email or web address]. By joining [employer]s FollowMyHealth portal, you will also be able to view your health information using other applications (apps) compatible with our system.

## 2021-07-06 NOTE — ED ADULT TRIAGE NOTE - CHIEF COMPLAINT QUOTE
Patient c/o worsening chronic neck pain and shoulder. Denies any recent falls, chest pain or shortness of breath. Took Tramadol at 2am

## 2021-07-06 NOTE — ED PROVIDER NOTE - OBJECTIVE STATEMENT
88 year old F with PMHx of aortic stenosis, aortic valve replaced, essential hypertension, hypothyroid, osteoporosis, and tibial fracture presents to ED complaining of neck pain and headache, especially when turning his head. Pain is worse with movement, especially when lying down and getting back up. Pt has been having similar pains on and off over 10 years pain became severe on Wednesday (6/30). She was previously seen for similar pain but was told it was wear and tear nothing can be done. Pain was previously manageable with Tylenol. Pt took Tramadol 2 am today which made her feel nauseas so she took demerol. 88 year old F with PMHx of aortic stenosis, aortic valve replaced, essential hypertension, hypothyroid, osteoporosis, and tibial fracture presents to ED complaining of neck pain and headache, especially when turning his head. Pain is worse with movement, especially when lying down and getting back up. Pt has been having similar pains on and off over 10 years pain became severe on Wednesday (6/30). She was previously seen for similar pain but was told it was wear and tear nothing can be done. She did have a lidocaine patch at the time that helped her pain. Pt notes she was previously manageable with Tylenol but it has worsened over the years so now she takes tramadol. Pt took Tramadol 2 am today which made her feel nauseas so she took demerol.

## 2021-07-12 ENCOUNTER — OUTPATIENT (OUTPATIENT)
Dept: OUTPATIENT SERVICES | Facility: HOSPITAL | Age: 86
LOS: 1 days | End: 2021-07-12
Payer: MEDICARE

## 2021-07-12 ENCOUNTER — APPOINTMENT (OUTPATIENT)
Dept: RADIOLOGY | Facility: CLINIC | Age: 86
End: 2021-07-12

## 2021-07-12 DIAGNOSIS — Z98.89 OTHER SPECIFIED POSTPROCEDURAL STATES: Chronic | ICD-10-CM

## 2021-07-12 DIAGNOSIS — Z95.2 PRESENCE OF PROSTHETIC HEART VALVE: Chronic | ICD-10-CM

## 2021-07-12 DIAGNOSIS — Z98.49 CATARACT EXTRACTION STATUS, UNSPECIFIED EYE: Chronic | ICD-10-CM

## 2021-07-12 DIAGNOSIS — Z96.7 PRESENCE OF OTHER BONE AND TENDON IMPLANTS: Chronic | ICD-10-CM

## 2021-07-12 PROCEDURE — 72040 X-RAY EXAM NECK SPINE 2-3 VW: CPT | Mod: 26

## 2021-07-12 NOTE — PATIENT PROFILE ADULT - NSPRESCRUSEDDRG_GEN_A_NUR
Called patient to schedule follow up but she was at Dialysis  Message left with spouse to have patient call back to schedule.     Reception please assist with scheduling when patient calls back    No

## 2021-08-15 ENCOUNTER — EMERGENCY (EMERGENCY)
Facility: HOSPITAL | Age: 86
LOS: 1 days | Discharge: HOME | End: 2021-08-15
Admitting: EMERGENCY MEDICINE
Payer: MEDICARE

## 2021-08-15 VITALS
SYSTOLIC BLOOD PRESSURE: 139 MMHG | TEMPERATURE: 98 F | RESPIRATION RATE: 18 BRPM | HEART RATE: 88 BPM | DIASTOLIC BLOOD PRESSURE: 84 MMHG | OXYGEN SATURATION: 99 % | WEIGHT: 104.94 LBS | HEIGHT: 59 IN

## 2021-08-15 DIAGNOSIS — M19.90 UNSPECIFIED OSTEOARTHRITIS, UNSPECIFIED SITE: ICD-10-CM

## 2021-08-15 DIAGNOSIS — Z88.6 ALLERGY STATUS TO ANALGESIC AGENT: ICD-10-CM

## 2021-08-15 DIAGNOSIS — Z96.7 PRESENCE OF OTHER BONE AND TENDON IMPLANTS: Chronic | ICD-10-CM

## 2021-08-15 DIAGNOSIS — M79.642 PAIN IN LEFT HAND: ICD-10-CM

## 2021-08-15 DIAGNOSIS — Z98.49 CATARACT EXTRACTION STATUS, UNSPECIFIED EYE: Chronic | ICD-10-CM

## 2021-08-15 DIAGNOSIS — Z88.1 ALLERGY STATUS TO OTHER ANTIBIOTIC AGENTS STATUS: ICD-10-CM

## 2021-08-15 DIAGNOSIS — M81.0 AGE-RELATED OSTEOPOROSIS WITHOUT CURRENT PATHOLOGICAL FRACTURE: ICD-10-CM

## 2021-08-15 DIAGNOSIS — Z79.01 LONG TERM (CURRENT) USE OF ANTICOAGULANTS: ICD-10-CM

## 2021-08-15 DIAGNOSIS — Z88.5 ALLERGY STATUS TO NARCOTIC AGENT: ICD-10-CM

## 2021-08-15 DIAGNOSIS — Z98.890 OTHER SPECIFIED POSTPROCEDURAL STATES: ICD-10-CM

## 2021-08-15 DIAGNOSIS — Z98.89 OTHER SPECIFIED POSTPROCEDURAL STATES: Chronic | ICD-10-CM
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
## 2021-08-15 DIAGNOSIS — Z95.2 PRESENCE OF PROSTHETIC HEART VALVE: Chronic | ICD-10-CM

## 2021-08-15 DIAGNOSIS — Z95.2 PRESENCE OF PROSTHETIC HEART VALVE: ICD-10-CM

## 2021-08-15 DIAGNOSIS — Z96.7 PRESENCE OF OTHER BONE AND TENDON IMPLANTS: ICD-10-CM

## 2021-08-15 DIAGNOSIS — S51.032A PUNCTURE WOUND WITHOUT FOREIGN BODY OF LEFT ELBOW, INITIAL ENCOUNTER: ICD-10-CM

## 2021-08-15 DIAGNOSIS — I10 ESSENTIAL (PRIMARY) HYPERTENSION: ICD-10-CM
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
## 2021-08-15 DIAGNOSIS — E03.9 HYPOTHYROIDISM, UNSPECIFIED: ICD-10-CM

## 2021-08-15 PROCEDURE — 99284 EMERGENCY DEPT VISIT MOD MDM: CPT

## 2021-08-15 RX ORDER — COLCHICINE 0.6 MG
1 TABLET ORAL
Qty: 30 | Refills: 0
Start: 2021-08-15 | End: 2021-09-13

## 2021-08-15 NOTE — ED PROVIDER NOTE - NSFOLLOWUPINSTRUCTIONS_ED_ALL_ED_FT
CPPD DISEASE DEFINITION  Pyrophosphate is a normal chemical that everyone has in the body. It is important for the healthy function of connective tissues such as bones, cartilage, and joints. However, in some cases, excess pyrophosphate complexes with calcium to form microscopic calcium pyrophosphate (CPP) crystals, which preferentially deposit in joint tissues.    Crystals sometimes do not cause symptoms; this is called "asymptomatic" calcium pyrophosphate deposition (CPPD) disease. At other times, the crystals can cause acute or more chronic (long-term) inflammation or degeneration of involved tissues. Each of these outcomes of CPP crystal deposition is a part of the spectrum of CPPD disease.    TYPES OF CPPD DISEASE  Calcium pyrophosphate deposition (CPPD) disease can present in a number of different ways. The range of presentations includes:    ?Asymptomatic CPPD disease (in which the person has no symptoms, but crystals can be seen on imaging tests, such as X-rays)    ?Acute calcium pyrophosphate (CPP) crystal arthritis (formerly called "pseudogout")    ?Chronic CPP crystal inflammatory arthritis    ?Osteoarthritis with CPPD    ?Severe joint degeneration    ?Spinal involvement    Acute CPP crystal arthritis (pseudogout) — Acute CPP crystal arthritis was the first type of CPPD disease to be widely recognized. It refers to sudden-onset attacks of joint pain, swelling, warmth, and difficulty using the affected joint. An attack can last for days or even weeks. The knee is affected in more than half of people with acute CPP crystal arthritis; however, the disorder can also affect the ankles, feet, shoulders, elbows, wrists, or hands.    This condition has also been called "pseudogout" because the symptoms of acute CPP crystal arthritis are very similar to those of gout, an arthritis caused by urate (uric acid) crystals rather than CPP crystals (see "Patient education: Gout (Beyond the Basics)"). Although the two disorders can cause similar symptoms, patients with pseudogout are treated somewhat differently than patients with gout. (See 'CPPD disease treatment' below.)    Chronic CPP crystal inflammatory arthritis — Some people with CPPD disease have more than a single joint involved at once. They have stiffness, pain, and swelling in multiple joints, which can last weeks to months. The pattern of involved joints is similar to those involved in acute CPP crystal arthritis. (See 'Acute CPP crystal arthritis (pseudogout)' above.)    Osteoarthritis with CPPD — CPPD can also occur in a joint with osteoarthritis, in which the cartilage has worn down. This is particularly common in the hip and knee joints. (See "Patient education: Osteoarthritis symptoms and diagnosis (Beyond the Basics)".)    Severe joint degeneration and spinal involvement — CPPD disease can lead over time to serious problems such as degeneration (breakdown) of the cartilage. When crystals form in and around the spine, it can also lead to back pain and stiffness, and sometimes compression of the spinal cord or surrounding nerves, but this type of involvement is unusual.    CPPD DISEASE RISK FACTORS  Calcium pyrophosphate deposition (CPPD) disease symptoms develop in some (but not all) people in response to the deposition of calcium pyrophosphate (CPP) crystals in the joints. The crystals first develop in the joint cartilage and eventually move to the lining of the joint (also called the synovium) or into the joint fluid, where they can cause inflammation with associated pain, swelling, and disability in the affected joint. In most people with CPPD disease, it is not known exactly why CPP crystals form and deposit in the joints, although abrupt shedding of crystals from deposits in cartilage is believed to promote acute inflammation. (See "Pathogenesis and etiology of calcium pyrophosphate crystal deposition (CPPD) disease".)    Some people, particularly older adults, have CPP crystals in their joints (called "chondrocalcinosis") but never experience symptoms of acute arthritis. Chondrocalcinosis is present in up to 50 percent of people by age 90, often without or with only modest symptoms.    In addition to older age, there are several other factors that increase the risk of accumulating CPP crystals in the joints, including:    ?Joint trauma – People who have previously experienced a significant injury to or surgery on a joint have an increased risk of developing CPP crystal deposits.    ?Genetics – People can inherit a predisposition to CPP crystal deposition (called "familial chondrocalcinosis"); these people are more likely to develop acute CPP crystal arthritis or other features of CPPD disease earlier in life than patients with sporadic CPPD disease.    ?Excess iron – People with a genetic disorder called hemochromatosis, which causes the body to store excess iron, are at an increased risk of developing CPP crystal deposits. (See "Patient education: Hereditary hemochromatosis (Beyond the Basics)".)    ?Other related disorders – Several other diseases of metabolism or endocrine glands are associated with CPPD disease. These include hyperparathyroidism (overactive parathyroid glands), hypophosphatasia (an inherited metabolic bone disorder), hypomagnesemia (low levels of magnesium in the blood), Gitelman syndrome (an inherited kidney disorder), and possibly others.    CPPD DISEASE DIAGNOSIS  A health care clinician can confirm or rule out a diagnosis of calcium pyrophosphate deposition (CPPD) disease by performing an examination and tests. In many patients, a sample of joint fluid is obtained in order to determine whether calcium pyrophosphate dihydrate (CPP) crystals are present in the fluid on microscopic examination and to exclude arthritis due to other causes, such as gout or joint infection.    Synovial fluid analysis — Synovial (joint) fluid is obtained under sterile conditions through a needle inserted into the affected joint. The fluid is then analyzed to determine if crystals or infection are present. The presence of CPP crystals in a patient with joint pain and inflammation strongly supports a diagnosis of acute CPP crystal arthritis.    Imaging — A clinician may examine the painful joint(s) or other frequently involved joints by taking X-rays or doing other imaging tests. X-ray images can reveal calcium-containing crystal deposits in the cartilage, a condition known as chondrocalcinosis. Ultrasonography (ultrasound examination) of affected joints is another imaging method that is increasingly being used to establish or confirm the diagnosis of CPPD disease or to guide synovial fluid aspiration (a "joint tap") to obtain material for synovial fluid analysis.    CPPD DISEASE COMPLICATIONS  Calcium pyrophosphate crystal deposition (CPPD) disease can lead to rapidly progressing osteoarthritis, caused by wearing down of the joint cartilage, bone cysts or spurs, and even fractures. These changes may occur in joints not usually involved in osteoarthritis, such as the knuckles and wrists.    Although treatment of episodes of acute CPP crystal arthritis can shorten the attack, treatment may not decrease the risk of developing a more chronic arthritis that in most ways resembles osteoarthritis. Treatment of this type of chronic arthritis is similar to that used for osteoarthritis. (See "Patient education: Osteoarthritis treatment (Beyond the Basics)".)    CPPD DISEASE TREATMENT  There is no treatment that can completely remove or prevent the formation of calcium pyrophosphate dihydrate (CPP) crystals. However, the joint pain and swelling generally resolve with treatment, including the following:    ?Joint aspiration and/or injection – A clinician may insert a needle into the affected joint to remove the fluid and crystals that have accumulated. This can help to relieve pressure and pain. An injection of glucocorticoids (steroids) into the joint may relieve the associated joint inflammation.    ?Oral medications – Joint aspiration or injection is usually preferred when one or two joints are affected but may not be recommended if more than two joints are affected during an attack. In this case, an oral medication, such as a nonsteroidal antiinflammatory drug (NSAID), an oral glucocorticoid, or colchicine, may be preferred.    Taking an NSAID such as ibuprofen (sample brand names: Advil, Motrin) or naproxen (sample brand names: Aleve, Naprosyn) can help to relieve symptoms of pain and inflammation. Prescription-strength tablets (as opposed to over-the-counter tablets) may make it more convenient to take the relatively high doses of NSAIDs that are needed to control an attack. Treatment of the acute attack of CPP crystal arthritis with oral antiinflammatory medications is usually continued only until the attack resolves. (See "Patient education: Nonsteroidal antiinflammatory drugs (NSAIDs) (Beyond the Basics)".)    ?Joint immobilization – Patients may be advised to avoid weightbearing (walking or running if the legs or feet are involved), avoid excessive movement, and limit activity for a period of time to minimize pain and swelling. In some cases, a temporary splint will be recommended to limit joint movement.    ?Treatment of related conditions – If the CPP crystal deposits are caused by a separate disorder (see 'CPPD disease risk factors' above), that condition may require treatment, although this may not affect the course of CPP crystal-related joint disease.

## 2021-08-15 NOTE — ED PROVIDER NOTE - CARE PROVIDER_API CALL
Puja Karimi)  Rheumatology  [address] [place] [address], [address] [place] [address]  Phone: [phone]  Fax: [phone]  Follow Up Time:     Moriah Reich; TAMARA)  Internal Medicine; Rheumatology  [address], [place] [address]  Phone: [phone]  Fax: [phone]  Follow Up Time:     Dr Whalen,   1-2 days  Phone: (   )    -  Fax: (   )    -  Follow Up Time:

## 2021-08-15 NOTE — ED ADULT TRIAGE NOTE - CHIEF COMPLAINT QUOTE
Patient to ED with complaint of left hand swelling and redness X several days that she believes is getting worse

## 2021-08-15 NOTE — ED ADULT NURSE NOTE - NSIMPLEMENTINTERV_GEN_ALL_ED
Implemented All Fall Risk Interventions:  [place] to call system. Call bell, personal items and telephone within reach. Instruct patient to call for assistance. Room bathroom lighting operational. Non-slip footwear when patient is off stretcher. Physically safe environment: no spills, clutter or unnecessary equipment. Stretcher in lowest position, wheels locked, appropriate side rails in place. Provide visual cue, wrist band, yellow gown, etc. Monitor gait and stability. Monitor for mental status changes and reorient to person, place, and time. Review medications for side effects contributing to fall risk. Reinforce activity limits and safety measures with patient and family.

## 2021-08-15 NOTE — ED PROVIDER NOTE - PATIENT PORTAL LINK FT
You can access the FollowMyHealth Patient Portal offered by [facility] by registering at the following website: [email or web address]. By joining [employer]’s FollowMyHealth portal, you will also be able to view your health information using other applications (apps) compatible with our system.

## 2021-08-15 NOTE — ED PROVIDER NOTE - OBJECTIVE STATEMENT
Patient PMHX AVR repair, OA, HTN, hypothyroidism on eliquis presents with atraumatic L hand pain x 5 days. patient saw her PCP Dr Calloway who prescribed augmentin without improvement. patient is requesting IV treatment that she was previously given at this ED during previous bout of symptoms. denies fever, chills, nightsweats. patient was just discharged from [facility] ED after having joint tapped and rheumatology consult with dx of pseudogout.

## 2021-08-15 NOTE — ED PROVIDER NOTE - PROVIDER TOKENS
PROVIDER:[TOKEN:[[id number]]],PROVIDER:[TOKEN:[[id number]]],FREE:[LAST:[Dr Whalen],PHONE:[(   )    -],FAX:[(   )    -],ADDRESS:[1-2 days]]

## 2021-08-15 NOTE — ED ADULT NURSE NOTE - CHIEF COMPLAINT
Patient to ED with complaint of left hand swelling and redness X several days that she believes is getting worse.

## 2021-08-15 NOTE — ED PROVIDER NOTE - CARE PROVIDERS DIRECT ADDRESSES
,[email or web address],DirectAddress_Unknown,DirectAddress_Unknown

## 2021-08-15 NOTE — ED PROVIDER NOTE - CLINICAL SUMMARY MEDICAL DECISION MAKING FREE TEXT BOX
PMHX AVR, OA, HTN, hypothroidism with frequent visits to this ED for atraumatic joint pain. patient dc today from [facility] ED after having joint aspiration found to have flair up of psuedo gout. management and findings discussed with her PCP Dr. Whalen. patient to be started on colchicine and follow up with him this week.

## 2021-08-15 NOTE — ED PROVIDER NOTE - SKIN TEMP
20 erythema over L wrist and forearm with puncture wound over dorsum of distal radius from site of joint aspiration/warm

## 2021-08-18 ENCOUNTER — NON-APPOINTMENT (OUTPATIENT)
Age: 86
End: 2021-08-18

## 2021-08-18 ENCOUNTER — APPOINTMENT (OUTPATIENT)
Dept: RHEUMATOLOGY | Facility: CLINIC | Age: 86
End: 2021-08-18
Payer: MEDICARE

## 2021-08-18 VITALS
DIASTOLIC BLOOD PRESSURE: 58 MMHG | SYSTOLIC BLOOD PRESSURE: 138 MMHG | HEART RATE: 80 BPM | TEMPERATURE: 97.5 F | OXYGEN SATURATION: 94 % | BODY MASS INDEX: 20.56 KG/M2 | HEIGHT: 59 IN | WEIGHT: 102 LBS

## 2021-08-18 DIAGNOSIS — M25.40 EFFUSION, UNSPECIFIED JOINT: ICD-10-CM

## 2021-08-18 DIAGNOSIS — M79.89 OTHER SPECIFIED SOFT TISSUE DISORDERS: ICD-10-CM

## 2021-08-18 PROCEDURE — 99204 OFFICE O/P NEW MOD 45 MIN: CPT

## 2021-08-20 RX ORDER — APIXABAN 5 MG/1
5 TABLET, FILM COATED ORAL
Refills: 0 | Status: ACTIVE | COMMUNITY

## 2021-08-20 RX ORDER — METOPROLOL TARTRATE 25 MG/1
25 TABLET, FILM COATED ORAL
Refills: 0 | Status: ACTIVE | COMMUNITY

## 2021-08-20 RX ORDER — UBIDECARENONE/VIT E ACET 100MG-5
CAPSULE ORAL
Refills: 0 | Status: ACTIVE | COMMUNITY

## 2021-08-20 RX ORDER — TRAZODONE HYDROCHLORIDE 50 MG/1
50 TABLET ORAL
Refills: 0 | Status: ACTIVE | COMMUNITY

## 2021-08-20 RX ORDER — METFORMIN HYDROCHLORIDE 500 MG/1
500 TABLET, COATED ORAL
Refills: 0 | Status: ACTIVE | COMMUNITY

## 2021-08-20 RX ORDER — DENOSUMAB 60 MG/ML
60 INJECTION SUBCUTANEOUS
Refills: 0 | Status: ACTIVE | COMMUNITY

## 2021-08-23 PROBLEM — M25.40 SWOLLEN JOINT: Status: ACTIVE | Noted: 2021-08-18

## 2021-08-23 PROBLEM — M79.89 HAND SWELLING: Status: ACTIVE | Noted: 2021-08-18

## 2021-08-23 NOTE — PHYSICAL EXAM
[General Appearance - In No Acute Distress] : in no acute distress [General Appearance - Alert] : alert [Exaggerated Use Of Accessory Muscles For Inspiration] : no accessory muscle use [] : no rash [Impaired Insight] : insight and judgment were intact [Affect] : the affect was normal [FreeTextEntry1] : left wrist with edema, minimal hyperpigmentation, decreased ROM, with dorsum of the hand swelling as well.  Decreased handgrip secondary to pain.

## 2021-08-23 NOTE — HISTORY OF PRESENT ILLNESS
[FreeTextEntry1] : 89 year old woman here to establish care following recent hospitalization for left wrist pain\par \par Patient developed left wrist pain last week\par Left wrist pain associated with swelling of the hand and wrist\par Started on Tuesday last week\par By Thursday morning, pain was worse\par Went to [facility], referred to the emergency room.  Patient decided to go [facility], admitted Friday 8/13/2021\par In the hospital from Friday to Sunday\par On Sunday, tapped the wrist, very painful, WBC 30, 000 but without crystals noted.\par Was seen by rheumatology, diagnosed with pseudogout\par \par Patient had similar episode in past, occurred in other wrist.  At that time, was treated with a medication that helped the pain and swelling but did not recall the name.  Was seen at [facility] at that time.  \par \par At this time, patient is feeling better although still with some swelling and erythema of the wrist\par Reviewed hospital discharge records, initially treated with antibiotics, then started on colchicine, no crystals note d on aspiration, xrays with chondrocalcinosis.  Culture negative\par \par Xrays in our pacs system with chondrocalcinosis as well, reviewed with patient\par Labs reviewed with patient from hospital discharge papers. \par Also with history of osteoporosis, treated with prolia, sees private doctor on [facility], may consider receiving mediations at this location as lives in [place].

## 2021-08-23 NOTE — DATA REVIEWED
[FreeTextEntry1] : labs: 8/13/2021\par WBC 9.1\par Hgb 12.4\par Hct 38.8\par plt 317\par creatinine 0.78\par calcium 10.3\par ESR 82\par CRP 17.0\par \par XRAY left wrist: marked diffuse soft tissue swelling about the distal forearm and wrist.  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

## 2021-08-23 NOTE — ASSESSMENT
[FreeTextEntry1] : 89 year old woman with recent hospitalization at [facility] for acute left wrist swelling and erythema. Initially treated for possible septic arthritis, diagnosed with chondrocalcinoses, s/p imaging and aspiration. Cultures negative.  Patient was started on colchicine 0.6 mg qday, now symptoms improving and pseudogout resolving.  Will continue colchicine daily for total of two weeks, then try to decrease to every other day.  If symptoms recur, will return to daily dosing as well.  Patient also with history of osteoporosis, treated with Prolia, may consider changing treatment to this location as closer to home. Follow up in three weeks or sooner if symptoms change or worsen.

## 2021-09-10 ENCOUNTER — APPOINTMENT (OUTPATIENT)
Dept: RHEUMATOLOGY | Facility: CLINIC | Age: 86
End: 2021-09-10
Payer: MEDICARE

## 2021-09-10 VITALS
TEMPERATURE: 98.2 F | DIASTOLIC BLOOD PRESSURE: 43 MMHG | HEIGHT: 59 IN | WEIGHT: 99.6 LBS | BODY MASS INDEX: 20.08 KG/M2 | HEART RATE: 82 BPM | SYSTOLIC BLOOD PRESSURE: 125 MMHG | OXYGEN SATURATION: 93 %

## 2021-09-10 PROCEDURE — 36415 COLL VENOUS BLD VENIPUNCTURE: CPT

## 2021-09-10 PROCEDURE — 99213 OFFICE O/P EST LOW 20 MIN: CPT | Mod: 25

## 2021-09-13 NOTE — HISTORY OF PRESENT ILLNESS
[FreeTextEntry1] : 89 year old woman here to establish care following recent hospitalization for left wrist pain\par \par Patient developed left wrist pain last week\par Left wrist pain associated with swelling of the hand and wrist\par Started on Tuesday last week\par By Thursday morning, pain was worse\par Went to [facility], referred to the emergency room.  Patient decided to go [facility], admitted Friday 8/13/2021\par In the hospital from Friday to Sunday\par On Sunday, tapped the wrist, very painful, WBC 30, 000 but without crystals noted.\par Was seen by rheumatology, diagnosed with pseudogout\par \par Patient had similar episode in past, occurred in other wrist.  At that time, was treated with a medication that helped the pain and swelling but did not recall the name.  Was seen at [facility] at that time.  \par \par At this time, patient is feeling better although still with some swelling and erythema of the wrist\par Reviewed hospital discharge records, initially treated with antibiotics, then started on colchicine, no crystals note d on aspiration, xrays with chondrocalcinosis.  Culture negative\par \par Xrays in our pacs system with chondrocalcinosis as well, reviewed with patient\par Labs reviewed with patient from hospital discharge papers. \par Also with history of osteoporosis, treated with prolia, sees private doctor on [facility], may consider receiving mediations at this location as lives in [place].\par \par September 10, 2021\par Wrist much improved\par No pain at present\par continues colchicine now every other day\par No side effects noted\par Discussed osteoporosis, last prolia in July 2021, will schedule follow up appointment December for prolia injection in January 2022.

## 2021-09-13 NOTE — PHYSICAL EXAM
[General Appearance - Alert] : alert [General Appearance - In No Acute Distress] : in no acute distress [Sclera] : the sclera and conjunctiva were normal [] : no respiratory distress [Respiration, Rhythm And Depth] : normal respiratory rhythm and effort [Exaggerated Use Of Accessory Muscles For Inspiration] : no accessory muscle use [Oriented To Time, Place, And Person] : oriented to person, place, and time [FreeTextEntry1] : left wrist without erythema, no edema, no tenderness, resolved.

## 2021-09-13 NOTE — ASSESSMENT
[FreeTextEntry1] : 89 year old woman with recent hospitalization at [facility] for acute left wrist swelling and erythema. Initially treated for possible septic arthritis, diagnosed with chondrocalcinoses, s/p imaging and aspiration. Cultures negative.  Patient was started on colchicine 0.6 mg qday, now on every other day.  Will decrease to twice per week until medications completed.  Will restart daily doing if symptoms recur and call office for follow up.  Patient also with history of osteoporosis, treated with Prolia, last dose in July 2021, next dose in January 2022, will schedule office visit for December 2021.

## 2021-10-01 NOTE — ED CDU PROVIDER DISPOSITION NOTE - NS ED MD EM DAY 1
Please let the patient know that she should increase the dose of gabapentin from 100+100+100 to  100+200+200 for 1 week and then 100+200+300. This would help for control of tension type headaches.     Thank you.   -dr. Alberto Heath Observation Stay One Calendar Day

## 2021-11-22 NOTE — ED PROVIDER NOTE - OBJECTIVE STATEMENT
87 yo female with PMHx of aortic stenosis, HTN, hypothyroid, osteoporosis, scoliosis, SVT, with +pacemaker, and on Eliquis presents s/p mechanical fall ~4 hours ago. patient states she was placing things in a cup and lost her footing, slipped and fell, scraping right rib cage on plastic bin. denies head trauma or LOC. c/o right posterior lower ribcage pain with superficial abrasion to site. denies headache, neck pain, back pain, numbness, weakness, upper or lower extremity pain. ambulated to ED. PAST MEDICAL HISTORY:  Urinary tract infection

## 2021-12-10 ENCOUNTER — APPOINTMENT (OUTPATIENT)
Dept: RHEUMATOLOGY | Facility: CLINIC | Age: 86
End: 2021-12-10
Payer: MEDICARE

## 2021-12-10 VITALS
OXYGEN SATURATION: 94 % | HEART RATE: 71 BPM | SYSTOLIC BLOOD PRESSURE: 120 MMHG | TEMPERATURE: 98.2 F | WEIGHT: 99.5 LBS | BODY MASS INDEX: 20.06 KG/M2 | DIASTOLIC BLOOD PRESSURE: 77 MMHG | HEIGHT: 59 IN

## 2021-12-10 DIAGNOSIS — Z86.39 PERSONAL HISTORY OF OTHER ENDOCRINE, NUTRITIONAL AND METABOLIC DISEASE: ICD-10-CM

## 2021-12-10 DIAGNOSIS — Z86.79 PERSONAL HISTORY OF OTHER DISEASES OF THE CIRCULATORY SYSTEM: ICD-10-CM

## 2021-12-10 DIAGNOSIS — Z72.89 OTHER PROBLEMS RELATED TO LIFESTYLE: ICD-10-CM

## 2021-12-10 DIAGNOSIS — Z87.891 PERSONAL HISTORY OF NICOTINE DEPENDENCE: ICD-10-CM

## 2021-12-10 DIAGNOSIS — Z13.21 ENCOUNTER FOR SCREENING FOR NUTRITIONAL DISORDER: ICD-10-CM

## 2021-12-10 DIAGNOSIS — Z87.39 PERSONAL HISTORY OF OTHER DISEASES OF THE MUSCULOSKELETAL SYSTEM AND CONNECTIVE TISSUE: ICD-10-CM

## 2021-12-10 DIAGNOSIS — Z85.820 PERSONAL HISTORY OF MALIGNANT MELANOMA OF SKIN: ICD-10-CM

## 2021-12-10 PROCEDURE — 99213 OFFICE O/P EST LOW 20 MIN: CPT | Mod: 25

## 2021-12-10 PROCEDURE — 36415 COLL VENOUS BLD VENIPUNCTURE: CPT

## 2021-12-10 NOTE — DATA REVIEWED
[FreeTextEntry1] : labs: 8/13/2021\par WBC 9.1\par Hgb 12.4\par Hct 38.8\par plt 317\par creatinine 0.78\par calcium 10.3\par ESR 82\par CRP 17.0\par \par EXAM: CT PELVIS BONY ONLY \par PROCEDURE DATE: 08/27/2016 \par INTERPRETATION: CT of pelvis without intravenous contrast \par Indication: Right hip pain. \par Technique: CT of pelvis is performed without intravenous contrast. \par Images were reviewed. \par Prior studies: Radiograph performed earlier on the same day. \par Findings: There is acute comminuted and slightly displaced fracture of the \par right superior and inferior pubic rami. There is a nondisplaced right sacral \par harris fracture of indeterminate age, possibly acute. There is swelling within \par the musculature surrounding the right pubic rami including the obturator and \par adductor muscles likely due to traumatic injury and hematoma. There is \par evidence of bone harvesting along the left iliac bone. The patient is status \par post ORIF of right femur proximal femoral diaphyseal fracture which appears \par to be healing. Visualized portion of the metallic hardware is intact. No \par acute hip fracture is seen. Osteopenia is present. \par A vaginal pessary is in place. Bladder is mildly displaced to the left by \par swollen soft tissues around the pubic rami. No drainable fluid collection is \par seen. The patient is status post hysterectomy. 1 cm cystic lesion in the \par right ovary. Unremarkable left ovary. Oral contrast within the right colon \par from the prior study. Suggestion of right colonic diverticulum. No bowel \par obstruction or free air. Postoperative changes in the lower anterior \par abdominal wall. Calcified injection granulomas in the buttocks. \par IMPRESSION: Fracture of the right superior and inferior pubic rami as well \par as slight nondisplaced fracture of the right sacrum. Soft tissue swelling \par around the fracture in the right side of the pelvis and proximal right thigh \par as described above. \par "Thank you for the opportunity to participate in the care of this patient." \par PASTORA PINO M.D., RADIOLOGY RESIDENT \par This document has been electronically signed. \par EDUARDO DAVE M.D., ATTENDING RADIOLOGIST \par This document has been electronically signed. Aug 27 2016 10:43AM \par \par XRAY left wrist: marked diffuse soft tissue swelling about the distal forearm and wrist.  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

## 2021-12-10 NOTE — HISTORY OF PRESENT ILLNESS
[FreeTextEntry1] : 89 year old woman here to establish care following recent hospitalization for left wrist pain\par \par December 10, 2021\par Patient walks for exercise\par Taking calcium and vitamin d\par No new medications\par Last Prolia July 2021, would like to start prolia in January 2022 at our location\par Needs to replace wheel on cart\par No recent blood tests\par History of hip fracture\par History of pubic rami fracture in past\par Vitamin D once per week on Friday\par Calcium once bid\par Not very much dairy in diet\par No dental work planned, no no jaw pain \par No side effects from previous prolia injections\par \par August 18, 2021\par Patient developed left wrist pain last week\par Left wrist pain associated with swelling of the hand and wrist\par Started on Tuesday last week\par By Thursday morning, pain was worse\par Went to [facility], referred to the emergency room.  Patient decided to go [facility], admitted Friday 8/13/2021\par In the hospital from Friday to Sunday\par On Sunday, tapped the wrist, very painful, WBC 30, 000 but without crystals noted.\par Was seen by rheumatology, diagnosed with pseudogout\par \par Patient had similar episode in past, occurred in other wrist.  At that time, was treated with a medication that helped the pain and swelling but did not recall the name.  Was seen at [facility] at that time.  \par \par At this time, patient is feeling better although still with some swelling and erythema of the wrist\par Reviewed hospital discharge records, initially treated with antibiotics, then started on colchicine, no crystals noted on aspiration, xrays with chondrocalcinosis.  Culture negative\par \par Xrays in our pacs system with chondrocalcinosis as well, reviewed with patient\par Labs reviewed with patient from hospital discharge papers. \par Also with history of osteoporosis, treated with prolia, sees private doctor on [facility], may consider receiving mediations at this location as lives in [place].\par \par September 10, 2021\par Wrist much improved\par No pain at present\par continues colchicine now every other day\par No side effects noted\par Discussed osteoporosis, last prolia in July 2021, will schedule follow up appointment December for prolia injection in January 2022.

## 2021-12-10 NOTE — ASSESSMENT
[FreeTextEntry1] : 89 year old woman previously seen for chondrocalcinosis. At this time, patient returns for follow up of osteoporosis, patient previously treated with prolia, last dose July 2021, due for next dose January 2022. Discussed risks and benefits of Prolia with patient. Discussed calcium and vitamin d supplementation, increasing calcium enriched foods, increasing weight bearing exercises as tolerated, and fall precautions discussed. Will update labs today in office.

## 2021-12-10 NOTE — PHYSICAL EXAM
[General Appearance - Alert] : alert [General Appearance - In No Acute Distress] : in no acute distress [Sclera] : the sclera and conjunctiva were normal [Respiration, Rhythm And Depth] : normal respiratory rhythm and effort [Exaggerated Use Of Accessory Muscles For Inspiration] : no accessory muscle use [Edema] : there was no peripheral edema [Abnormal Walk] : normal gait [Nail Clubbing] : no clubbing  or cyanosis of the fingernails [Motor Tone] : muscle strength and tone were normal [] : no rash [Oriented To Time, Place, And Person] : oriented to person, place, and time [Impaired Insight] : insight and judgment were intact [Affect] : the affect was normal [FreeTextEntry1] : walk with shopping cart

## 2021-12-13 LAB
25(OH)D3 SERPL-MCNC: 75.6 NG/ML
ANION GAP SERPL CALC-SCNC: 10 MMOL/L
BUN SERPL-MCNC: 22 MG/DL
CALCIUM SERPL-MCNC: 9.6 MG/DL
CHLORIDE SERPL-SCNC: 104 MMOL/L
CO2 SERPL-SCNC: 29 MMOL/L
CREAT SERPL-MCNC: 1 MG/DL
GLUCOSE SERPL-MCNC: 99 MG/DL
POTASSIUM SERPL-SCNC: 4.7 MMOL/L
SODIUM SERPL-SCNC: 143 MMOL/L

## 2022-01-20 ENCOUNTER — APPOINTMENT (OUTPATIENT)
Dept: RHEUMATOLOGY | Facility: CLINIC | Age: 87
End: 2022-01-20
Payer: MEDICARE

## 2022-01-20 VITALS
OXYGEN SATURATION: 95 % | SYSTOLIC BLOOD PRESSURE: 107 MMHG | HEART RATE: 85 BPM | RESPIRATION RATE: 14 BRPM | TEMPERATURE: 98.2 F | DIASTOLIC BLOOD PRESSURE: 67 MMHG

## 2022-01-20 PROCEDURE — 96372 THER/PROPH/DIAG INJ SC/IM: CPT

## 2022-01-20 RX ORDER — DENOSUMAB 60 MG/ML
60 INJECTION SUBCUTANEOUS
Qty: 1 | Refills: 0 | Status: COMPLETED | OUTPATIENT
Start: 2022-01-20

## 2022-05-10 NOTE — ED PROVIDER NOTE - PSH
H/O aortic valve replacement    S/P arthroscopic knee surgery    S/P cataract surgery    S/P ORIF (open reduction internal fixation) fracture    
yes

## 2022-05-21 ENCOUNTER — EMERGENCY (EMERGENCY)
Facility: HOSPITAL | Age: 87
LOS: 1 days | Discharge: HOME | End: 2022-05-21
Attending: EMERGENCY MEDICINE | Admitting: EMERGENCY MEDICINE
Payer: MEDICARE

## 2022-05-21 VITALS
TEMPERATURE: 98 F | WEIGHT: 108.03 LBS | HEART RATE: 73 BPM | OXYGEN SATURATION: 94 % | DIASTOLIC BLOOD PRESSURE: 70 MMHG | RESPIRATION RATE: 16 BRPM | SYSTOLIC BLOOD PRESSURE: 152 MMHG | HEIGHT: 59 IN

## 2022-05-21 DIAGNOSIS — Z96.7 PRESENCE OF OTHER BONE AND TENDON IMPLANTS: Chronic | ICD-10-CM

## 2022-05-21 DIAGNOSIS — Z98.89 OTHER SPECIFIED POSTPROCEDURAL STATES: Chronic | ICD-10-CM

## 2022-05-21 DIAGNOSIS — Z95.2 PRESENCE OF PROSTHETIC HEART VALVE: Chronic | ICD-10-CM

## 2022-05-21 DIAGNOSIS — Z98.49 CATARACT EXTRACTION STATUS, UNSPECIFIED EYE: Chronic | ICD-10-CM

## 2022-05-21 PROCEDURE — 70450 CT HEAD/BRAIN W/O DYE: CPT | Mod: 26

## 2022-05-21 PROCEDURE — 99284 EMERGENCY DEPT VISIT MOD MDM: CPT

## 2022-05-21 NOTE — ED PROVIDER NOTE - OBJECTIVE STATEMENT
Triage VS: HR: 73, BP: 152/70, Resp.: 16, Temp.: 98.5 F, SpO2: 94.   Triage Note: instructed by insurance triage nurse to come to er s/p a 20oz can falling off shelf striking her in the head today, -pain -loc Triage VS: HR: 73, BP: 152/70, Resp.: 16, Temp.: 98.5 F, SpO2: 94.   Triage Note: instructed by insurance triage nurse to come to er s/p a 20oz can falling off shelf striking her in the head today, -pain -loc    89 y/o F with PMH of Aortic stenosis, Aortic valve replaced, hypertension, hypothyroid, osteoporosis, tibial fracture who was in the supermarket today when a 20 oz can of pineapple fell off the shelf and hit her on the top right part of her head. No LOC. Pt endorses pain to the area. She picked the can up put it back on the shelf and went home. At home she called [facility] and the nurse told her to come to the ER as she is on Aspirin. Patient has no other complaints. After the interview pt walked from chair D to the restroom and back to chair D without a problem.

## 2022-05-21 NOTE — ED PROVIDER NOTE - CLINICAL SUMMARY MEDICAL DECISION MAKING FREE TEXT BOX
88 y/o F presents for pain to the right top of her head after a can fell on her at the supermarket today. Patient is on Aspirin. On exam she has a 2 cm area of contusion with ecchymosis and some tenderness. Pt is comfortably walking around the ED. Will CAT scan head and discharge home pending results. 88 y/o F presents for pain to the right top of her head after a can fell on her at the supermarket today. Patient is on Aspirin. On exam she has a 2 cm area of contusion with ecchymosis and some tenderness. Pt is comfortably walking around the ED. Will CAT scan head and discharge home pending results.    ED evaluation and management discussed with the patient and family (if available) in detail.  Close PMD follow up encouraged.  Strict ED return instructions discussed in detail and patient given the opportunity to ask any questions about their discharge diagnosis and instructions. Patient verbalized understanding.

## 2022-05-21 NOTE — ED PROVIDER NOTE - PATIENT PORTAL LINK FT
HPI: Yamila Tamayo is a 44 year old female being seen at [facility] today for asthma f/u.    Yamila reports feeling much better today compared to last visit. Yamila reports her SOB has now resolved.  Reports using her Advair inhaler twice daily since her last visit. In addition she reports using her Albuterol inhaler as needed, in the last week she has not used her inhaler at all.     Yamila also reports right ear tenderness that started 3 days ago, she denies drainage or itchiness.     Yamila's problem list, allergies, medications and vital signs were reviewed.    ROS:  General: Denies fever.Denies chills.   CV: Denies chest pain, swelling of hands/feet.  HEENT: Denies recent cold/flu.  Lungs: Denies SOB.    Exam:  Visit Vitals  /74 (BP Location: [facility], Patient Position: Sitting, Cuff Size: Regular)   Pulse 72   Ht 4' 10.5\" (1.486 m)   Wt 76.2 kg   LMP 09/23/2017 (Exact Date)   SpO2 98%   BMI 34.51 kg/m²        General: cooperative, no acute distress.  Eyes: No ptosis, discharge, lesions, redness.  Conjunctivae clear, sclera white. EOMs intact, no nystagmus.  Corneal light reflex intact.  PERRL.  HENT: Normocephalic.  No sinus tenderness.  Pinna: no lesions. Mild tenderness upon palpation.  Canals clear and without drainage.  TM's pearly grey with light reflex noted and landmarks visualized bilaterally. Nares with no drainage. Turbinates pink/moist, septum midline. Pharynx pink and without exudate. Tonsils +2. Uvula rises midline without deviation.  Mucous membranes moist, oral cavity without lesions.   Neck: Supple with normal movement. No lymphadenopathy. Trachea midline. No bruits.  Cardiology: S1, S2. No S3, S4, murmurs.   Pulmonary: Normal effort. Lungs CTA all lobes.  No adventitious sounds auscultated.  GI: Normal bowel sounds in all quadrants. Tympanic to percussion. Soft, no masses palpated, no tenderness.  No hepatosplenomegaly.  Psychiatric:  Cooperative, Appropriate mood & affect.        Assessment/Plan  (J45.32) Mild persistent asthma with status asthmaticus  (primary encounter diagnosis)  Comment: Well controlled with current medications. Pt to apply for patient assistance program for Ventolin and Advair. Pt currently has enough of the Proair, sample of the Advair given.         Health Maintenance:  Encouraged a healthy diet with daily fruits/vegetables.  Encouraged patient to get cardiovascular exercise several times per week. Pt is currently on the waiting list for well/wise woman program.     Education was provided regarding the above findings and plan.  The patient expressed verbal understanding and agreement with the above plan of care.   30 minutes was spent with the patient, greater than 50% was time spent counseling the patient regarding the above findings and plan    Notes above were scribed for Clint Romero NP.   Raysa Hinson NP student   I have reviewed the notes scribed above, and agree with their content.     Clint Romero, SOPHIE-BC    You can access the FollowMyHealth Patient Portal offered by [facility] by registering at the following website: [email or web address]. By joining [facility]’s FollowMyHealth portal, you will also be able to view your health information using other applications (apps) compatible with our system.

## 2022-05-21 NOTE — ED ADULT TRIAGE NOTE - CHIEF COMPLAINT QUOTE
instructed by insurance triage nurse to come to er s/p a 20oz can falling off shelf striking her in the head today, -pain -loc

## 2022-05-21 NOTE — ED PROVIDER NOTE - NSFOLLOWUPINSTRUCTIONS_ED_ALL_ED_FT
stay well hydrated     tylenol or ibuprofen for pain    follow up with your doctor as necessary     return to ER for headache vomiting or any other concern

## 2022-05-21 NOTE — ED PROVIDER NOTE - NSICDXFAMILYHX_GEN_ALL_CORE_FT
FAMILY HISTORY:  Family history of diabetes mellitus    Father  Still living? Unknown  Family history of renal cell carcinoma, Age at diagnosis: Age Unknown    Mother  Still living? Unknown  Family history of hypertension, Age at diagnosis: Age Unknown  Family history of stroke, Age at diagnosis: Age Unknown

## 2022-05-21 NOTE — ED ADULT NURSE NOTE - OBJECTIVE STATEMENT
Pt was shopping at the [employer] today and a 20 oz can of [employer] pineapple fell onto the R side of her head. She has a hematoma to the area, TTP, but otherwise not painful. No change in vision, HA, dizziness, nausea, pain.    Takes 81 ASA daily, but not on any other anticoags/antiplatelets.
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
## 2022-05-23 DIAGNOSIS — S09.90XA UNSPECIFIED INJURY OF HEAD, INITIAL ENCOUNTER: ICD-10-CM

## 2022-05-23 DIAGNOSIS — Z79.01 LONG TERM (CURRENT) USE OF ANTICOAGULANTS: ICD-10-CM
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
## 2022-05-23 DIAGNOSIS — E03.9 HYPOTHYROIDISM, UNSPECIFIED: ICD-10-CM

## 2022-05-23 DIAGNOSIS — I35.2 NONRHEUMATIC AORTIC (VALVE) STENOSIS WITH INSUFFICIENCY: ICD-10-CM

## 2022-05-23 DIAGNOSIS — M81.0 AGE-RELATED OSTEOPOROSIS WITHOUT CURRENT PATHOLOGICAL FRACTURE: ICD-10-CM

## 2022-05-23 DIAGNOSIS — Z88.6 ALLERGY STATUS TO ANALGESIC AGENT: ICD-10-CM

## 2022-05-23 DIAGNOSIS — Z95.4 PRESENCE OF OTHER HEART-VALVE REPLACEMENT: ICD-10-CM

## 2022-05-23 DIAGNOSIS — I10 ESSENTIAL (PRIMARY) HYPERTENSION: ICD-10-CM

## 2022-05-23 DIAGNOSIS — Z88.8 ALLERGY STATUS TO OTHER DRUGS, MEDICAMENTS AND BIOLOGICAL SUBSTANCES STATUS: ICD-10-CM

## 2022-05-23 DIAGNOSIS — Z88.5 ALLERGY STATUS TO NARCOTIC AGENT: ICD-10-CM

## 2022-05-23 DIAGNOSIS — Z79.82 LONG TERM (CURRENT) USE OF ASPIRIN: ICD-10-CM

## 2022-06-09 ENCOUNTER — APPOINTMENT (OUTPATIENT)
Dept: RHEUMATOLOGY | Facility: CLINIC | Age: 87
End: 2022-06-09
Payer: MEDICARE

## 2022-06-09 ENCOUNTER — LABORATORY RESULT (OUTPATIENT)
Age: 87
End: 2022-06-09

## 2022-06-09 VITALS
SYSTOLIC BLOOD PRESSURE: 91 MMHG | TEMPERATURE: 97.8 F | DIASTOLIC BLOOD PRESSURE: 44 MMHG | BODY MASS INDEX: 20.76 KG/M2 | OXYGEN SATURATION: 94 % | HEIGHT: 59 IN | WEIGHT: 103 LBS | HEART RATE: 80 BPM

## 2022-06-09 DIAGNOSIS — R30.0 DYSURIA: ICD-10-CM

## 2022-06-09 PROCEDURE — 36415 COLL VENOUS BLD VENIPUNCTURE: CPT

## 2022-06-09 PROCEDURE — 99214 OFFICE O/P EST MOD 30 MIN: CPT | Mod: 25

## 2022-06-09 NOTE — HISTORY OF PRESENT ILLNESS
[FreeTextEntry1] : 89 year old woman here to establish care following recent hospitalization for left wrist pain\par \par December 10, 2021\par Patient walks for exercise\par Taking calcium and vitamin d\par No new medications\par Last Prolia July 2021, would like to start prolia in January 2022 at our location\par Needs to replace wheel on cart\par No recent blood tests\par History of hip fracture\par History of pubic rami fracture in past\par Vitamin D once per week on Friday\par Calcium once bid\par Not very much dairy in diet\par No dental work planned, no no jaw pain \par No side effects from previous prolia injections\par \par August 18, 2021\par Patient developed left wrist pain last week\par Left wrist pain associated with swelling of the hand and wrist\par Started on Tuesday last week\par By Thursday morning, pain was worse\par Went to [facility], referred to the emergency room.  Patient decided to go [facility], admitted Friday 8/13/2021\par In the hospital from Friday to Sunday\par On Sunday, tapped the wrist, very painful, WBC 30, 000 but without crystals noted.\par Was seen by rheumatology, diagnosed with pseudogout\par \par Patient had similar episode in past, occurred in other wrist.  At that time, was treated with a medication that helped the pain and swelling but did not recall the name.  Was seen at [facility] at that time.  \par \par At this time, patient is feeling better although still with some swelling and erythema of the wrist\par Reviewed hospital discharge records, initially treated with antibiotics, then started on colchicine, no crystals noted on aspiration, xrays with chondrocalcinosis.  Culture negative\par \par Xrays in our pacs system with chondrocalcinosis as well, reviewed with patient\par Labs reviewed with patient from hospital discharge papers. \par Also with history of osteoporosis, treated with prolia, sees private doctor on [facility], may consider receiving mediations at this location as lives in [place].\par \par September 10, 2021\par Wrist much improved\par No pain at present\par continues colchicine now every other day\par No side effects noted\par Discussed osteoporosis, last prolia in July 2021, will schedule follow up appointment December for prolia injection in January 2022.\par \par June 9, 2022\par Patient returns for follow up\par Prolia due for July 20, 2022, last dose \par Feels left wrist pain\par Restarted colchicine May 31 for pain in the left thumb\par Feels pain at the base of the left thumb\par Today starting to feel better\par No other joint pains at the present\par Taking calcium and vitamin D\par Walking for exercise\par No falls\par Have dentures\par No jaw pain\par History of chronic cystitis with worsening symptoms, awaiting GYN evaluation.  \par Was seen by PMD about two weeks ago, previously treated with antibiotics, also had urine test at that time told abnormal but unclear if improved since that time\par Patient requesting repeat urine test with culture today\par \par

## 2022-06-09 NOTE — REVIEW OF SYSTEMS
[Dysuria] : dysuria [Joint Pain] : joint pain [Negative] : Heme/Lymph [FreeTextEntry9] : left base of thumb

## 2022-06-09 NOTE — ASSESSMENT
[FreeTextEntry1] : 89 year old woman previously seen for chondrocalcinosis. At this time, patient returns for evaluation of left hand pain, patient with left thumb pain without associated effusion.  Patient restart colchicine about one week as well.  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 discussed trial of spica splint as well.  Will start to decrease colchicine to every 48 hours for this week and then reevaluate and possibly discontinue at that time. Patient reports worsening of dysuria and frequency, has a history of chronic cystitis but feel symptoms worsening, requesting repeat urine culture today, has follow up with GYN scheduled for next month. Patient with history of osteoporosis, treated with prolia, last dose January 2022, scheduled or next dose July 2022. Discussed risks and benefits of Prolia with patient. Discussed calcium and vitamin d supplementation, increasing calcium enriched foods, increasing weight bearing exercises as tolerated, and fall precautions discussed. Will update labs today in office.

## 2022-06-09 NOTE — PHYSICAL EXAM
[General Appearance - Alert] : alert [General Appearance - In No Acute Distress] : in no acute distress [General Appearance - Well-Appearing] : healthy appearing [Sclera] : the sclera and conjunctiva were normal [] : no respiratory distress [Respiration, Rhythm And Depth] : normal respiratory rhythm and effort [Exaggerated Use Of Accessory Muscles For Inspiration] : no accessory muscle use [Edema] : there was no peripheral edema [Musculoskeletal - Swelling] : no joint swelling seen [Oriented To Time, Place, And Person] : oriented to person, place, and time [Impaired Insight] : insight and judgment were intact [Affect] : the affect was normal [FreeTextEntry1] : pain with ROM of the left CMC, no effusion, no warmth.  No wrist tenderness or effusion.  No edema of the left hand

## 2022-06-10 ENCOUNTER — LABORATORY RESULT (OUTPATIENT)
Age: 87
End: 2022-06-10

## 2022-06-10 LAB
ANION GAP SERPL CALC-SCNC: 11 MMOL/L
APPEARANCE: CLEAR
BILIRUBIN URINE: NEGATIVE
BLOOD URINE: NEGATIVE
BUN SERPL-MCNC: 18 MG/DL
CALCIUM SERPL-MCNC: 9.4 MG/DL
CHLORIDE SERPL-SCNC: 103 MMOL/L
CO2 SERPL-SCNC: 27 MMOL/L
COLOR: YELLOW
CREAT SERPL-MCNC: 1.19 MG/DL
EGFR: 44 ML/MIN/1.73M2
GLUCOSE QUALITATIVE U: NEGATIVE
GLUCOSE SERPL-MCNC: 134 MG/DL
KETONES URINE: NEGATIVE
LEUKOCYTE ESTERASE URINE: ABNORMAL
NITRITE URINE: NEGATIVE
PH URINE: 6.5
POTASSIUM SERPL-SCNC: 4.6 MMOL/L
PROTEIN URINE: NORMAL
SODIUM SERPL-SCNC: 141 MMOL/L
SPECIFIC GRAVITY URINE: 1.02
UROBILINOGEN URINE: NORMAL

## 2022-07-09 NOTE — ED PROVIDER NOTE - DISPOSITION TYPE
[place] INPATIENT ENCOUNTER   SURGERY DAILY PROGRESS NOTE    ADMISSION DATE:  6/29/2022  DATE:  7/9/2022  CURRENT HOSPITAL DAY:  Hospital Day: 11  SURGEON:  Dr. 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 (CMS/[facility])    Diabetic ulcer of right midfoot associated with diabetes mellitus due to underlying condition, unspecified ulcer stage (CMS/[facility])      POSTOPERATIVE DAY:    -Guillotine amputation of right distal lower extremity on 6/29/22 with Dr Bardales  -Completion below the knee amputation on 7/6/22 with Dr Arango    INTERVAL HISTORY:    Atul Borrero is a 47 year old male patient admitted with ESRD on dialysis (CMS/[facility]) [N18.6, Z99.2]  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     MEDICATIONS:    The medication list was reviewed today.     HISTORIES:    I have reviewed the past medical history, family history, social history, medications and allergies listed in the medical record as obtained by my nursing staff and support staff and agree with their documentation    OBJECTIVE:    VITAL SIGNS:    Vital Last Value 24 Hour Range   Temperature 97.6 °F (36.4 °C) Temp  Min: 97.5 °F (36.4 °C)  Max: 97.8 °F (36.6 °C)   Pulse 80 Pulse  Min: 77  Max: 82   Respiratory 18 Resp  Min: 16  Max: 18   Blood Pressure 113/75 BP  Min: 113/75  Max: 164/81    Pulse Oximetry 100 % SpO2  Min: 100 %  Max: 100 %     INTAKE/OUTPUT:      Intake/Output Summary (Last 24 hours) at 7/9/2022 1057  Last data filed at 7/8/2022 1830  Gross per 24 hour   Intake 1074 ml   Output 4500 ml   Net -3426 ml         PHYSICAL EXAM:    Constitutional:  The patient is alert, oriented and cooperative.   Integument:  Warm. Dry. No erythema. No rash.    HENT:  Normocephalic. Atraumatic. Bilateral external ears normal.   Neck: Normal range of motion. No tenderness. Supple. No stridor.    Cardiovascular:  Normal heart rate. Normal rhythm. No murmurs. No rubs. No gallops.    Respiratory:  On room air with no increased work of breathing or accessory muscle use  Abdomen: Soft, Non-distended, Non-tender, 0  Extremity: Right lower extremity surgical dressing removed.  Surgical incision well approximated with intact staples and minimal serous drainage.  No advancing ischemia or necrosis.      7/9/22         LABORATORY DATA:    Recent Labs   Lab 07/09/22  0605 07/08/22  1146 07/08/22  0713 07/01/22  0538 06/30/22  0657   WBC 11.3*  --  11.9*   < > 14.1*   RBC 2.79*  --  2.33*   < > 2.71*   HGB 8.4* 8.9* 6.9*   < > 8.2*   HCT 26.1* 28.2* 22.3*   < > 24.5*   MCV 93.5  --  95.7   < > 90.4     --  218   < > 194   Absolute Neutrophils  --   --   --   --  11.9*   Absolute Lymphocytes  --   --   --   --  0.8*   Absolute Monocytes  --   --   --   --  1.0*   Absolute Eosinophils   --   --   --   --  0.0   Absolute Basophils  --   --   --   --  0.0    < > = values in this interval not displayed.        IMAGING STUDIES:    No results found.                             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  Staples to remain until at least 7/20/22.    -Recommend daily dressing changes with light ACE wrap to assist in edema control  -Recommend knee immobilizer while patient is in bed to assist with lower leg cramping.  -Encouraged brisk thigh message to assist with phantom pains.   -OK to discharge to rehab from surgical perspective.     Time spent in the care of this patient was greater than 30 minutes including evaluation of the patient, review of the chart, creation of plan, and coordination of care.    Patient care performed in collaboration with Dr Atul Guerin.    NICKIE Hutchinson      ATTESTATION STATEMENT:  This is a shared visit between myself and NICKIE Lovell  I have seen and evaluated Atul with her, and I agree with her history, I repeated specific examination findings and agree with the assessment and plan as documented above.      DISCHARGE

## 2022-07-20 ENCOUNTER — APPOINTMENT (OUTPATIENT)
Dept: RHEUMATOLOGY | Facility: CLINIC | Age: 87
End: 2022-07-20

## 2022-07-20 ENCOUNTER — MED ADMIN CHARGE (OUTPATIENT)
Age: 87
End: 2022-07-20

## 2022-07-20 VITALS
SYSTOLIC BLOOD PRESSURE: 118 MMHG | TEMPERATURE: 97.4 F | HEART RATE: 73 BPM | RESPIRATION RATE: 14 BRPM | DIASTOLIC BLOOD PRESSURE: 55 MMHG | OXYGEN SATURATION: 97 %

## 2022-07-20 PROCEDURE — 96372 THER/PROPH/DIAG INJ SC/IM: CPT

## 2022-07-20 RX ORDER — DENOSUMAB 60 MG/ML
60 INJECTION SUBCUTANEOUS
Qty: 1 | Refills: 0 | Status: COMPLETED | OUTPATIENT
Start: 2022-07-15

## 2022-09-01 NOTE — ED ADULT NURSE NOTE - NS ED NURSE DC INFO COMPLEXITY
01-Sep-2022 17:50 Simple: Patient demonstrates quick and easy understanding/Verbalized Understanding

## 2022-11-14 RX ORDER — COLCHICINE 0.6 MG/1
0.6 CAPSULE ORAL
Qty: 30 | Refills: 0 | Status: ACTIVE | COMMUNITY
Start: 2021-09-10 | End: 1900-01-01

## 2023-01-11 ENCOUNTER — APPOINTMENT (OUTPATIENT)
Dept: RHEUMATOLOGY | Facility: CLINIC | Age: 88
End: 2023-01-11
Payer: MEDICARE

## 2023-01-11 VITALS
HEART RATE: 79 BPM | TEMPERATURE: 97 F | HEIGHT: 59 IN | OXYGEN SATURATION: 95 % | SYSTOLIC BLOOD PRESSURE: 131 MMHG | BODY MASS INDEX: 20.16 KG/M2 | WEIGHT: 100 LBS | DIASTOLIC BLOOD PRESSURE: 69 MMHG

## 2023-01-11 PROCEDURE — 99213 OFFICE O/P EST LOW 20 MIN: CPT | Mod: 25

## 2023-01-11 PROCEDURE — 36415 COLL VENOUS BLD VENIPUNCTURE: CPT

## 2023-01-11 NOTE — ASSESSMENT
[FreeTextEntry1] : 89 year old woman previously seen for chondrocalcinosis, currently doing well.  Patient continues colchicine as needed for prophylaxis, currently asymptomatic.  At this time, patient with history of osteoporosis, treated with prolia, last dose July 2022, scheduled or next dose January 2023. No side effects noted, no jaw pain.  Discussed risks and benefits of Prolia with patient. Discussed calcium and vitamin d supplementation, increasing calcium enriched foods, increasing weight bearing exercises as tolerated, and fall precautions discussed. Will update labs today in office.  Follow up in six months or sooner as needed.

## 2023-01-11 NOTE — HISTORY OF PRESENT ILLNESS
[FreeTextEntry1] : 89 year old woman \par \par December 10, 2021\par Patient walks for exercise\par Taking calcium and vitamin d\par No new medications\par Last Prolia July 2021, would like to start prolia in January 2022 at our location\par Needs to replace wheel on cart\par No recent blood tests\par History of hip fracture\par History of pubic rami fracture in past\par Vitamin D once per week on Friday\par Calcium once bid\par Not very much dairy in diet\par No dental work planned, no no jaw pain \par No side effects from previous prolia injections\par \par August 18, 2021\par Patient developed left wrist pain last week\par Left wrist pain associated with swelling of the hand and wrist\par Started on Tuesday last week\par By Thursday morning, pain was worse\par Went to [facility], referred to the emergency room.  Patient decided to go [facility], admitted Friday 8/13/2021\par In the hospital from Friday to Sunday\par On Sunday, tapped the wrist, very painful, WBC 30, 000 but without crystals noted.\par Was seen by rheumatology, diagnosed with pseudogout\par \par Patient had similar episode in past, occurred in other wrist.  At that time, was treated with a medication that helped the pain and swelling but did not recall the name.  Was seen at [facility] at that time.  \par \par At this time, patient is feeling better although still with some swelling and erythema of the wrist\par Reviewed hospital discharge records, initially treated with antibiotics, then started on colchicine, no crystals noted on aspiration, xrays with chondrocalcinosis.  Culture negative\par \par Xrays in our pacs system with chondrocalcinosis as well, reviewed with patient\par Labs reviewed with patient from hospital discharge papers. \par Also with history of osteoporosis, treated with prolia, sees private doctor on [place], may consider receiving mediations at this location as lives in [place].\par \par September 10, 2021\par Wrist much improved\par No pain at present\par continues colchicine now every other day\par No side effects noted\par Discussed osteoporosis, last prolia in July 2021, will schedule follow up appointment December for prolia injection in January 2022.\par \par June 9, 2022\par Patient returns for follow up\par Prolia due for July 20, 2022, last dose \par Feels left wrist pain\par Restarted colchicine May 31 for pain in the left thumb\par Feels pain at the base of the left thumb\par Today starting to feel better\par No other joint pains at the present\par Taking calcium and vitamin D\par Walking for exercise\par No falls\par Have dentures\par No jaw pain\par History of chronic cystitis with worsening symptoms, awaiting GYN evaluation.  \par Was seen by PMD about two weeks ago, previously treated with antibiotics, also had urine test at that time told abnormal but unclear if improved since that time\par Patient requesting repeat urine test with culture today\par \par January 11, 2023\par Patient returns for follow up\par Patient due for prolia this month\par Patient feeling well\par Reports some increase hand pain intermittently, took couple of doses of colchicine since last visit which helped resolve the pain\par No jaw pain, patient has dentures\par Continues vitamin D\par Walks daily with shopping cart. \par Some intermittent back pain, reviewed previous MRI in 2021\par No side effects from previous prolia injections\par \par

## 2023-01-11 NOTE — PHYSICAL EXAM
[General Appearance - Alert] : alert [General Appearance - In No Acute Distress] : in no acute distress [General Appearance - Well-Appearing] : healthy appearing [Sclera] : the sclera and conjunctiva were normal [] : no respiratory distress [Respiration, Rhythm And Depth] : normal respiratory rhythm and effort [Exaggerated Use Of Accessory Muscles For Inspiration] : no accessory muscle use [No Spinal Tenderness] : no spinal tenderness [Oriented To Time, Place, And Person] : oriented to person, place, and time [Impaired Insight] : insight and judgment were intact [Affect] : the affect was normal [Mood] : the mood was normal [FreeTextEntry1] : ambulates with forward posture pushing cart.  cervical kyphosis.

## 2023-01-12 LAB
ANION GAP SERPL CALC-SCNC: 11 MMOL/L
BUN SERPL-MCNC: 27 MG/DL
CALCIUM SERPL-MCNC: 9.6 MG/DL
CHLORIDE SERPL-SCNC: 101 MMOL/L
CO2 SERPL-SCNC: 29 MMOL/L
CREAT SERPL-MCNC: 0.91 MG/DL
EGFR: 60 ML/MIN/1.73M2
GLUCOSE SERPL-MCNC: 118 MG/DL
POTASSIUM SERPL-SCNC: 4.8 MMOL/L
SODIUM SERPL-SCNC: 141 MMOL/L

## 2023-01-26 ENCOUNTER — APPOINTMENT (OUTPATIENT)
Dept: RHEUMATOLOGY | Facility: CLINIC | Age: 88
End: 2023-01-26
Payer: MEDICARE

## 2023-01-26 VITALS
DIASTOLIC BLOOD PRESSURE: 76 MMHG | SYSTOLIC BLOOD PRESSURE: 125 MMHG | HEART RATE: 70 BPM | TEMPERATURE: 97.9 F | OXYGEN SATURATION: 94 % | RESPIRATION RATE: 14 BRPM

## 2023-01-26 PROCEDURE — 96372 THER/PROPH/DIAG INJ SC/IM: CPT

## 2023-01-26 RX ORDER — DENOSUMAB 60 MG/ML
60 INJECTION SUBCUTANEOUS
Qty: 1 | Refills: 0 | Status: COMPLETED | OUTPATIENT
Start: 2023-01-17

## 2023-01-27 ENCOUNTER — APPOINTMENT (OUTPATIENT)
Dept: RADIOLOGY | Facility: CLINIC | Age: 88
End: 2023-01-27
Payer: MEDICARE

## 2023-01-27 ENCOUNTER — OUTPATIENT (OUTPATIENT)
Dept: OUTPATIENT SERVICES | Facility: HOSPITAL | Age: 88
LOS: 1 days | End: 2023-01-27

## 2023-01-27 DIAGNOSIS — Z96.7 PRESENCE OF OTHER BONE AND TENDON IMPLANTS: Chronic | ICD-10-CM

## 2023-01-27 DIAGNOSIS — Z98.49 CATARACT EXTRACTION STATUS, UNSPECIFIED EYE: Chronic | ICD-10-CM

## 2023-01-27 DIAGNOSIS — Z98.89 OTHER SPECIFIED POSTPROCEDURAL STATES: Chronic | ICD-10-CM

## 2023-01-27 DIAGNOSIS — Z95.2 PRESENCE OF PROSTHETIC HEART VALVE: Chronic | ICD-10-CM

## 2023-01-27 PROCEDURE — 72110 X-RAY EXAM L-2 SPINE 4/>VWS: CPT | Mod: 26

## 2023-04-10 NOTE — ED PROVIDER NOTE - DOMESTIC TRAVEL HIGH RISK QUESTION
SUBJECTIVE / OVERNIGHT EVENTS:pt seen and examined  04-10-23     MEDICATIONS  (STANDING):  albuterol/ipratropium for Nebulization 3 milliLiter(s) Nebulizer every 6 hours  budesonide 160 MICROgram(s)/formoterol 4.5 MICROgram(s) Inhaler 2 Puff(s) Inhalation two times a day  dextrose 5%. 1000 milliLiter(s) (50 mL/Hr) IV Continuous <Continuous>  dextrose 5%. 1000 milliLiter(s) (100 mL/Hr) IV Continuous <Continuous>  dextrose 50% Injectable 25 Gram(s) IV Push once  dextrose 50% Injectable 12.5 Gram(s) IV Push once  dextrose 50% Injectable 25 Gram(s) IV Push once  glucagon  Injectable 1 milliGRAM(s) IntraMuscular once  insulin glargine Injectable (LANTUS) 6 Unit(s) SubCutaneous at bedtime  insulin lispro (ADMELOG) corrective regimen sliding scale   SubCutaneous three times a day before meals  insulin lispro (ADMELOG) corrective regimen sliding scale   SubCutaneous at bedtime  levothyroxine 50 MICROGram(s) Oral daily  pantoprazole    Tablet 40 milliGRAM(s) Oral two times a day  piperacillin/tazobactam IVPB.. 3.375 Gram(s) IV Intermittent every 8 hours  simvastatin 20 milliGRAM(s) Oral at bedtime  tiotropium 2.5 MICROgram(s) Inhaler 2 Puff(s) Inhalation daily    MEDICATIONS  (PRN):  acetaminophen     Tablet .. 650 milliGRAM(s) Oral every 6 hours PRN Temp greater or equal to 38C (100.4F), Mild Pain (1 - 3)  albuterol    90 MICROgram(s) HFA Inhaler 2 Puff(s) Inhalation every 3 hours PRN Shortness of Breath and/or Wheezing  dextrose Oral Gel 15 Gram(s) Oral once PRN Blood Glucose LESS THAN 70 milliGRAM(s)/deciliter  melatonin 3 milliGRAM(s) Oral at bedtime PRN Insomnia    T(C): 37.1 (04-10-23 @ 18:30), Max: 37.1 (04-10-23 @ 18:30)  HR: 78 (04-10-23 @ 22:47) (73 - 88)  BP: 102/56 (04-10-23 @ 18:30) (102/56 - 139/61)  RR: 20 (04-10-23 @ 18:30) (20 - 22)  SpO2: 100% (04-10-23 @ 22:47) (97% - 100%)    CAPILLARY BLOOD GLUCOSE      POCT Blood Glucose.: 194 mg/dL (10 Apr 2023 21:54)  POCT Blood Glucose.: 194 mg/dL (10 Apr 2023 18:54)  POCT Blood Glucose.: 194 mg/dL (10 Apr 2023 17:56)  POCT Blood Glucose.: 256 mg/dL (10 Apr 2023 12:46)  POCT Blood Glucose.: 175 mg/dL (10 Apr 2023 09:09)  POCT Blood Glucose.: 151 mg/dL (09 Apr 2023 23:52)    I&O's Summary  Vital Signs Last 24 Hrs  T(C): 37.1 (04-10-23 @ 18:30), Max: 37.1 (04-10-23 @ 18:30)  T(F): 98.7 (04-10-23 @ 18:30), Max: 98.7 (04-10-23 @ 18:30)  HR: 78 (04-10-23 @ 22:47) (73 - 88)  BP: 102/56 (04-10-23 @ 18:30) (102/56 - 139/61)  BP(mean): --  RR: 20 (04-10-23 @ 18:30) (20 - 22)  SpO2: 100% (04-10-23 @ 22:47) (97% - 100%)    Orthostatic VS  04-10-23 @ 10:15  Lying BP: 113/56 HR: 79  Sitting BP: 122/61 HR: 81  Standing BP: 127/49 HR: 82  Site: upper left arm  Mode: electronic      PHYSICAL EXAM:  GENERAL: NAD  EYES: EOMI, PERRLA  NECK: Supple, No JVD  CHEST/LUNG: dec breath sounds at bases  HEART:  S1 , S2 +  ABDOMEN: soft , bs+  EXTREMITIES:    NEUROLOGY:alert awake      LABS:                        9.3    10.17 )-----------( 236      ( 10 Apr 2023 04:37 )             33.4     04-10    138  |  98  |  24<H>  ----------------------------<  151<H>  4.7   |  28  |  0.99    Ca    9.2      10 Apr 2023 04:37  Phos  3.9     04-10  Mg     1.90     04-10    TPro  6.5  /  Alb  4.0  /  TBili  0.8  /  DBili  x   /  AST  44<H>  /  ALT  97<H>  /  AlkPhos  151<H>  04-10    PT/INR - ( 09 Apr 2023 09:14 )   PT: 13.4 sec;   INR: 1.15 ratio         PTT - ( 09 Apr 2023 09:14 )  PTT:24.6 sec      Urinalysis Basic - ( 09 Apr 2023 14:38 )    Color: Light Yellow / Appearance: Clear / SG: >1.050 / pH: x  Gluc: x / Ketone: Negative  / Bili: Negative / Urobili: <2 mg/dL   Blood: x / Protein: 30 mg/dL / Nitrite: Negative   Leuk Esterase: Negative / RBC: 0 /HPF / WBC 2 /HPF   Sq Epi: x / Non Sq Epi: x / Bacteria: Negative        RADIOLOGY & ADDITIONAL TESTS:    Imaging Personally Reviewed:yes    Consultant(s) Notes Reviewed:  yes    Care Discussed with Consultants/Other Providers:yes   No

## 2023-05-03 ENCOUNTER — OUTPATIENT (OUTPATIENT)
Dept: OUTPATIENT SERVICES | Facility: HOSPITAL | Age: 88
LOS: 1 days | End: 2023-05-03

## 2023-05-03 ENCOUNTER — APPOINTMENT (OUTPATIENT)
Dept: CT IMAGING | Facility: CLINIC | Age: 88
End: 2023-05-03
Payer: MEDICARE

## 2023-05-03 DIAGNOSIS — Z98.49 CATARACT EXTRACTION STATUS, UNSPECIFIED EYE: Chronic | ICD-10-CM

## 2023-05-03 DIAGNOSIS — Z98.89 OTHER SPECIFIED POSTPROCEDURAL STATES: Chronic | ICD-10-CM

## 2023-05-03 DIAGNOSIS — Z95.2 PRESENCE OF PROSTHETIC HEART VALVE: Chronic | ICD-10-CM

## 2023-05-03 DIAGNOSIS — Z96.7 PRESENCE OF OTHER BONE AND TENDON IMPLANTS: Chronic | ICD-10-CM

## 2023-05-03 PROCEDURE — 71250 CT THORAX DX C-: CPT | Mod: 26

## 2023-05-18 NOTE — PHYSICAL THERAPY INITIAL EVALUATION ADULT - GROSSLY INTACT, SENSORY
Patient states she would like to come in as soon as possible.  Patient stated she would like to get the ball rolling so that she can take a summer to heal.  Patient goes back to work in August.  Please advise.    ALICIA Lion  
Left LE/Left UE/Grossly Intact/Right UE/Right LE

## 2023-06-12 ENCOUNTER — OUTPATIENT (OUTPATIENT)
Dept: OUTPATIENT SERVICES | Facility: HOSPITAL | Age: 88
LOS: 1 days | End: 2023-06-12
Payer: MEDICARE

## 2023-06-12 ENCOUNTER — APPOINTMENT (OUTPATIENT)
Dept: RADIOLOGY | Facility: CLINIC | Age: 88
End: 2023-06-12

## 2023-06-12 DIAGNOSIS — Z98.49 CATARACT EXTRACTION STATUS, UNSPECIFIED EYE: Chronic | ICD-10-CM

## 2023-06-12 DIAGNOSIS — Z98.89 OTHER SPECIFIED POSTPROCEDURAL STATES: Chronic | ICD-10-CM

## 2023-06-12 DIAGNOSIS — Z95.2 PRESENCE OF PROSTHETIC HEART VALVE: Chronic | ICD-10-CM

## 2023-06-12 PROCEDURE — 72100 X-RAY EXAM L-S SPINE 2/3 VWS: CPT | Mod: 26

## 2023-06-14 NOTE — ED PROVIDER NOTE - CONSTITUTIONAL NEGATIVE STATEMENT, MLM
[FreeTextEntry1] : I, Mirta Xie, solely acted as scribe for Dr. Anthony Shaikh on 05/26/2023.\par  no fever and no chills.

## 2023-07-26 ENCOUNTER — APPOINTMENT (OUTPATIENT)
Dept: RHEUMATOLOGY | Facility: CLINIC | Age: 88
End: 2023-07-26
Payer: MEDICARE

## 2023-07-26 PROCEDURE — 36415 COLL VENOUS BLD VENIPUNCTURE: CPT

## 2023-07-27 LAB
ANION GAP SERPL CALC-SCNC: 11 MMOL/L
BUN SERPL-MCNC: 24 MG/DL
CALCIUM SERPL-MCNC: 9.5 MG/DL
CHLORIDE SERPL-SCNC: 105 MMOL/L
CO2 SERPL-SCNC: 29 MMOL/L
CREAT SERPL-MCNC: 1.09 MG/DL
EGFR: 48 ML/MIN/1.73M2
GLUCOSE SERPL-MCNC: 102 MG/DL
POTASSIUM SERPL-SCNC: 4.9 MMOL/L
SODIUM SERPL-SCNC: 144 MMOL/L

## 2023-07-28 ENCOUNTER — APPOINTMENT (OUTPATIENT)
Dept: RHEUMATOLOGY | Facility: CLINIC | Age: 88
End: 2023-07-28
Payer: MEDICARE

## 2023-07-28 PROCEDURE — 96372 THER/PROPH/DIAG INJ SC/IM: CPT

## 2023-07-28 RX ORDER — DENOSUMAB 60 MG/ML
60 INJECTION SUBCUTANEOUS
Qty: 1 | Refills: 0 | Status: COMPLETED | OUTPATIENT
Start: 2023-07-20

## 2023-10-30 RX ORDER — COLCHICINE 0.6 MG/1
0.6 TABLET ORAL
Qty: 14 | Refills: 0 | Status: ACTIVE | COMMUNITY
Start: 1900-01-01 | End: 1900-01-01

## 2024-01-17 ENCOUNTER — APPOINTMENT (OUTPATIENT)
Dept: RHEUMATOLOGY | Facility: CLINIC | Age: 89
End: 2024-01-17
Payer: MEDICARE

## 2024-01-17 VITALS
HEIGHT: 58 IN | SYSTOLIC BLOOD PRESSURE: 119 MMHG | WEIGHT: 99 LBS | HEART RATE: 71 BPM | BODY MASS INDEX: 20.78 KG/M2 | OXYGEN SATURATION: 94 % | TEMPERATURE: 96.2 F | DIASTOLIC BLOOD PRESSURE: 50 MMHG

## 2024-01-17 DIAGNOSIS — M11.20 OTHER CHONDROCALCINOSIS, UNSPECIFIED SITE: ICD-10-CM

## 2024-01-17 DIAGNOSIS — G89.29 DORSALGIA, UNSPECIFIED: ICD-10-CM

## 2024-01-17 DIAGNOSIS — M54.9 DORSALGIA, UNSPECIFIED: ICD-10-CM

## 2024-01-17 PROCEDURE — 36415 COLL VENOUS BLD VENIPUNCTURE: CPT

## 2024-01-17 PROCEDURE — 99213 OFFICE O/P EST LOW 20 MIN: CPT | Mod: 25

## 2024-01-18 PROBLEM — M11.20 CHONDROCALCINOSIS: Status: ACTIVE | Noted: 2021-08-23

## 2024-01-18 PROBLEM — M54.9 CHRONIC BACK PAIN: Status: ACTIVE | Noted: 2024-01-18

## 2024-01-18 LAB
25(OH)D3 SERPL-MCNC: 64.2 NG/ML
ANION GAP SERPL CALC-SCNC: 10 MMOL/L
BUN SERPL-MCNC: 28 MG/DL
CALCIUM SERPL-MCNC: 9.2 MG/DL
CHLORIDE SERPL-SCNC: 103 MMOL/L
CO2 SERPL-SCNC: 28 MMOL/L
CREAT SERPL-MCNC: 0.83 MG/DL
EGFR: 67 ML/MIN/1.73M2
GLUCOSE SERPL-MCNC: 89 MG/DL
POTASSIUM SERPL-SCNC: 5.3 MMOL/L
SODIUM SERPL-SCNC: 141 MMOL/L

## 2024-01-18 NOTE — ASSESSMENT
[FreeTextEntry1] : 89 year old woman previously seen for chondrocalcinosis, currently doing well. Patient continues colchicine as needed for prophylaxis wrist pain and swelling, currently asymptomatic, takes once every few months. At this time, patient with history of osteoporosis, treated with prolia, last dose July 2023, next dose February 2, 2024. No side effects noted, no jaw pain. Discussed risks and benefits of Prolia with patient. Discussed calcium and vitamin d supplementation, increasing calcium enriched foods, increasing weight bearing exercises as tolerated, and fall precautions discussed. Will update labs today in office, including BMP and vitamin D. Follow up in six months or sooner as needed.  .

## 2024-01-18 NOTE — PHYSICAL EXAM
[General Appearance - Alert] : alert [General Appearance - In No Acute Distress] : in no acute distress [General Appearance - Well Nourished] : well nourished [General Appearance - Well Developed] : well developed [General Appearance - Well-Appearing] : healthy appearing [Sclera] : the sclera and conjunctiva were normal [Respiration, Rhythm And Depth] : normal respiratory rhythm and effort [Exaggerated Use Of Accessory Muscles For Inspiration] : no accessory muscle use [Edema] : there was no peripheral edema [] : no rash [Oriented To Time, Place, And Person] : oriented to person, place, and time [Impaired Insight] : insight and judgment were intact [Affect] : the affect was normal [Mood] : the mood was normal [No Spinal Tenderness] : no spinal tenderness [Musculoskeletal - Swelling] : no joint swelling seen [FreeTextEntry1] : ambulates with forward posture pushing cart.  cervical kyphosis.

## 2024-01-18 NOTE — END OF VISIT
[FreeTextEntry3] : All medical record entries made by the Scribe were at my, Dr. Puja Karimi MD, direction and personally dictated by me on 01/17/2024. I have reviewed the chart and agree that the record accurately reflects my personal performance of the history, physical exam, assessment and plan. I have also personally directed, reviewed, and agreed with the chart. [Time Spent: ___ minutes] : I have spent [unfilled] minutes of time on the encounter.

## 2024-01-18 NOTE — HISTORY OF PRESENT ILLNESS
[FreeTextEntry1] : Returns for follow up of chondrocalcinosis  December 10, 2021 Patient walks for exercise Taking calcium and vitamin d No new medications Last Prolia July 2021, would like to start prolia in January 2022 at our location Needs to replace wheel on cart No recent blood tests History of hip fracture History of pubic rami fracture in past Vitamin D once per week on Friday Calcium once bid Not very much dairy in diet No dental work planned, no no jaw pain  No side effects from previous prolia injections  August 18, 2021 Patient developed left wrist pain last week Left wrist pain associated with swelling of the hand and wrist Started on Tuesday last week By Thursday morning, pain was worse Went to [facility], referred to the emergency room.  Patient decided to go [facility], admitted Friday 8/13/2021 In the hospital from Friday to Sunday On Sunday, tapped the wrist, very painful, WBC 30, 000 but without crystals noted. Was seen by rheumatology, diagnosed with pseudogout  Patient had similar episode in past, occurred in other wrist.  At that time, was treated with a medication that helped the pain and swelling but did not recall the name.  Was seen at [facility] at that time.    At this time, patient is feeling better although still with some swelling and erythema of the wrist Reviewed hospital discharge records, initially treated with antibiotics, then started on colchicine, no crystals noted on aspiration, xrays with chondrocalcinosis.  Culture negative  Xrays in our pacs system with chondrocalcinosis as well, reviewed with patient Labs reviewed with patient from hospital discharge papers.  Also with history of osteoporosis, treated with prolia, sees private doctor on [facility], may consider receiving mediations at this location as lives in [place].  September 10, 2021 Wrist much improved No pain at present continues colchicine now every other day No side effects noted Discussed osteoporosis, last prolia in July 2021, will schedule follow up appointment December for prolia injection in January 2022.  June 9, 2022 Patient returns for follow up Prolia due for July 20, 2022, last dose  Feels left wrist pain Restarted colchicine May 31 for pain in the left thumb Feels pain at the base of the left thumb Today starting to feel better No other joint pains at the present Taking calcium and vitamin D Walking for exercise No falls Have dentures No jaw pain History of chronic cystitis with worsening symptoms, awaiting GYN evaluation.   Was seen by PMD about two weeks ago, previously treated with antibiotics, also had urine test at that time told abnormal but unclear if improved since that time Patient requesting repeat urine test with culture today  January 11, 2023 Patient returns for follow up Patient due for prolia this month Patient feeling well Reports some increase hand pain intermittently, took couple of doses of colchicine since last visit which helped resolve the pain No jaw pain, patient has dentures Continues vitamin D Walks daily with shopping cart.  Some intermittent back pain, reviewed previous MRI in 2021 No side effects from previous prolia injections  January 17, 2024 Patient returns for follow up for osteoporosis Patient with history of chondrocalcinosis, asymptomatic at this time Reports chronic pain in lower back and right shoulder Walks with cart as support when outdoors No jaw pain  No recent falls Next Prolia on February 2nd Takes colchicine as needed for pain in left wrist, every few months with improvement Takes Tylenol as needed for back pain, does not feel much relief Patient is currently taking metformin, calcium, vitamin D No planned dental work Recent blood work with endocrinology  Reviewed previous x-rays of spine, reports more recent xrays as well Patient walks when the weather allows

## 2024-01-18 NOTE — ADDENDUM
[FreeTextEntry1] : I, Chris Lindquist, documented this note as a scribe on behalf of Dr. Puja Karimi MD on 01/17/2024.

## 2024-02-02 ENCOUNTER — APPOINTMENT (OUTPATIENT)
Dept: RHEUMATOLOGY | Facility: CLINIC | Age: 89
End: 2024-02-02
Payer: MEDICARE

## 2024-02-02 DIAGNOSIS — M81.0 AGE-RELATED OSTEOPOROSIS W/OUT CURRENT PATHOLOGICAL FRACTURE: ICD-10-CM

## 2024-02-02 PROCEDURE — 96372 THER/PROPH/DIAG INJ SC/IM: CPT

## 2024-02-02 RX ORDER — DENOSUMAB 60 MG/ML
60 INJECTION SUBCUTANEOUS
Qty: 1 | Refills: 0 | Status: COMPLETED | OUTPATIENT
Start: 2024-01-25

## 2024-02-28 NOTE — PATIENT PROFILE ADULT - ..
Medicare Wellness Visit  Plan for Preventive Care    A good way for you to stay healthy is to use preventive care.  Medicare covers many services that can help you stay healthy.* The goal of these services is to find any health problems as quickly as possible. Finding problems early can help make them easier to treat.  Your personal plan below lists the services you may need and when they are due.      Health Maintenance Summary     COVID-19 Vaccine (5 - 2023-24 season)  Overdue since 9/1/2023    Medicare Advantage- Medicare Wellness Visit (Yearly - January to December)  Due since 1/1/2024    Depression Screening (Yearly)  Next due on 2/28/2025    DTaP/Tdap/Td Vaccine (2 - Td or Tdap)  Next due on 2/3/2030    Pneumococcal Vaccine 65+   Completed    Shingles Vaccine   Completed    Influenza Vaccine   Completed    Meningococcal Vaccine   Aged Out    Hepatitis B Vaccine (For Physician/APC Discussion)   Aged Out    HPV Vaccine   Aged Out           Preventive Care for Women and Men    Heart Screenings (Cardiovascular):  Blood tests are used to check your cholesterol, lipid and triglyceride levels. High levels can increase your risk for heart disease and stroke. High levels can be treated with medications, diet and exercise. Lowering your levels can help keep your heart and blood vessels healthy.  Your provider will order these tests if they are needed.    An ultrasound is done to see if you have an abdominal aortic aneurysm (AAA).  This is an enlargement of one of the main blood vessels that delivers blood to the body.   In the [place], 9,000 deaths are caused by AAA.  You may not even know you have this problem and as many as 1 in 3 people will have a serious problem if it is not treated.  Early diagnosis allows for more effective treatment and cure.  If you have a family history of AAA or are a male age 65-75 who has smoked, you are at higher risk of an AAA.  Your provider can order this test, if  needed.    Colorectal Screening:  There are many tests that are used to check for cancer of your colon and rectum. You and your provider should discuss what test is best for you and when to have it done.  Options include:  Screening Colonoscopy: exam of the entire colon, seen through a flexible lighted tube.  Flexible Sigmoidoscopy: exam of the last third (sigmoid portion) of the colon and rectum, seen through a flexible lighted tube.  Cologuard DNA stool test: a sample of your stool is used to screen for cancer and unseen blood in your stool.  Fecal Occult Blood Test: a sample of your stool is studied to find any unseen blood    Flu Shot:  An immunization that helps to prevent influenza (the flu). You should get this every year. The best time to get the shot is in the fall.    Pneumococcal Shot:  Vaccines help prevent pneumococcal disease, which is any type of illness caused by Streptococcus pneumoniae bacteria. There are two kinds of pneumococcal vaccines available in the [place]:   Pneumococcal conjugate vaccines (PCV20 or Efhvylp45®)  Pneumococcal polysaccharide vaccine (PPSV23 or [id number]®)  For those who have never received any pneumococcal conjugate vaccine, CDC recommends PVC20 for adults 65 years or older and adults 19 through 64 years old with certain medical conditions or risk factors.   For those who have previously received PCV13, this should be followed by a dose of PPSV23.     Hepatitis B Shot:  An immunization that helps to protect people from getting Hepatitis B. Hepatitis B is a virus that spreads through contact with infected blood or body fluids. Many people with the virus do not have symptoms.  The virus can lead to serious problems, such as liver disease. Some people are at higher risk than others. Your doctor will tell you if you need this shot.     Diabetes Screening:  A test to measure sugar (glucose) in your blood is called a fasting blood sugar. Fasting means you cannot have food  or drink for at least 8 hours before the test. This test can detect diabetes long before you may notice symptoms.    Glaucoma Screening:  Glaucoma screening is performed by your eye doctor. The test measures the fluid pressure inside your eyes to determine if you have glaucoma.     Hepatitis C Screening:  A blood test to see if you have the hepatitis C virus.  Hepatitis C attacks the liver and is a major cause of chronic liver disease.  Medicare will cover a single screening for all adults born between 1945 & 1965, or high risk patients (people who have injected illegal drugs or people who have had blood transfusions).  High risk patients who continue to inject illegal drugs can be screened for Hepatitis C every year.    Smoking and Tobacco-Use Cessation Counseling:  Tobacco is the single greatest cause of disease and early death in our country today. Medication and counseling together can increase a person’s chance of quitting for good.   Medicare covers two quitting attempts per year, with four counseling sessions per attempt (eight sessions in a 12 month period)    Preventive Screening tests for Women    Screening Mammograms and Breast Exams:  An x-ray of your breasts to check for breast cancer before you or your doctor may be able to feel it.  If breast cancer is found early it can usually be treated with success.    Pelvic Exams and Pap Tests:  An exam to check for cervical and vaginal cancer. A Pap test is a lab test in which cells are taken from your cervix and sent to the lab to look for signs of cervical cancer. If cancer of the cervix is found early, chances for a cure are good. Testing can generally end at age 65, or if a woman has a hysterectomy for a benign condition. Your provider may recommend more frequent testing if certain abnormal results are found.    Bone Mass Measurements:  A painless x-ray of your bone density to see if you are at risk for a broken bone. Bone density refers to the thickness of  bones or how tightly the bone tissue is packed.    Preventive Screening tests for Men    Prostate Screening:  Should you have a prostate cancer test (PSA)?  It is up to you to decide if you want a prostate cancer test. Talk to your clinician to find out if the test is right for you.  Things for you to consider and talk about should include:  Benefits and harms of the test  Your family history  How your race/ethnicity may influence the test  If the test may impact other medical conditions you have  Your values on screenings and treatments    *Medicare pays for many preventive services to keep you healthy. For some of these services, you might have to pay a deductible, coinsurance, and / or copayment.  The amounts vary depending on the type of services you need and the kind of Medicare health plan you have.    For further details on screenings offered by Medicare please visit: https://www.medicare.gov/coverage/preventive-screening-services      24-Nov-2019 01:48:41

## 2024-06-28 ENCOUNTER — OUTPATIENT (OUTPATIENT)
Dept: OUTPATIENT SERVICES | Facility: HOSPITAL | Age: 89
LOS: 1 days | End: 2024-06-28
Payer: MEDICARE

## 2024-06-28 ENCOUNTER — APPOINTMENT (OUTPATIENT)
Dept: RADIOLOGY | Facility: CLINIC | Age: 89
End: 2024-06-28

## 2024-06-28 DIAGNOSIS — Z95.2 PRESENCE OF PROSTHETIC HEART VALVE: Chronic | ICD-10-CM

## 2024-06-28 DIAGNOSIS — Z98.89 OTHER SPECIFIED POSTPROCEDURAL STATES: Chronic | ICD-10-CM

## 2024-06-28 PROCEDURE — 72100 X-RAY EXAM L-S SPINE 2/3 VWS: CPT | Mod: 26

## 2024-06-28 PROCEDURE — 73552 X-RAY EXAM OF FEMUR 2/>: CPT | Mod: 26,RT

## 2024-06-28 PROCEDURE — 73502 X-RAY EXAM HIP UNI 2-3 VIEWS: CPT | Mod: 26,RT

## 2024-07-17 ENCOUNTER — APPOINTMENT (OUTPATIENT)
Dept: RHEUMATOLOGY | Facility: CLINIC | Age: 89
End: 2024-07-17
Payer: MEDICARE

## 2024-07-17 VITALS
DIASTOLIC BLOOD PRESSURE: 72 MMHG | HEART RATE: 71 BPM | TEMPERATURE: 98 F | HEIGHT: 58 IN | OXYGEN SATURATION: 93 % | WEIGHT: 100 LBS | SYSTOLIC BLOOD PRESSURE: 125 MMHG | BODY MASS INDEX: 20.99 KG/M2

## 2024-07-17 DIAGNOSIS — M81.0 AGE-RELATED OSTEOPOROSIS W/OUT CURRENT PATHOLOGICAL FRACTURE: ICD-10-CM

## 2024-07-17 DIAGNOSIS — M11.20 OTHER CHONDROCALCINOSIS, UNSPECIFIED SITE: ICD-10-CM

## 2024-07-17 PROCEDURE — 99214 OFFICE O/P EST MOD 30 MIN: CPT

## 2024-07-17 PROCEDURE — G2211 COMPLEX E/M VISIT ADD ON: CPT

## 2024-07-17 PROCEDURE — 36415 COLL VENOUS BLD VENIPUNCTURE: CPT

## 2024-07-18 LAB
ANION GAP SERPL CALC-SCNC: 11 MMOL/L
BUN SERPL-MCNC: 23 MG/DL
CALCIUM SERPL-MCNC: 9.7 MG/DL
CHLORIDE SERPL-SCNC: 104 MMOL/L
CO2 SERPL-SCNC: 26 MMOL/L
CREAT SERPL-MCNC: 1.11 MG/DL
EGFR: 47 ML/MIN/1.73M2
GLUCOSE SERPL-MCNC: 127 MG/DL
POTASSIUM SERPL-SCNC: 4 MMOL/L
SODIUM SERPL-SCNC: 142 MMOL/L

## 2024-07-25 ENCOUNTER — APPOINTMENT (OUTPATIENT)
Dept: RHEUMATOLOGY | Facility: CLINIC | Age: 89
End: 2024-07-25

## 2024-08-02 ENCOUNTER — APPOINTMENT (OUTPATIENT)
Dept: RHEUMATOLOGY | Facility: CLINIC | Age: 89
End: 2024-08-02
Payer: MEDICARE

## 2024-08-02 VITALS
DIASTOLIC BLOOD PRESSURE: 81 MMHG | OXYGEN SATURATION: 96 % | SYSTOLIC BLOOD PRESSURE: 150 MMHG | RESPIRATION RATE: 14 BRPM | TEMPERATURE: 98 F | HEART RATE: 71 BPM

## 2024-08-02 DIAGNOSIS — M81.0 AGE-RELATED OSTEOPOROSIS W/OUT CURRENT PATHOLOGICAL FRACTURE: ICD-10-CM

## 2024-08-02 PROCEDURE — 96372 THER/PROPH/DIAG INJ SC/IM: CPT

## 2024-08-02 RX ORDER — DENOSUMAB 60 MG/ML
60 INJECTION SUBCUTANEOUS
Qty: 1 | Refills: 0 | Status: COMPLETED | OUTPATIENT
Start: 2024-07-25

## 2024-12-24 PROBLEM — F10.90 ALCOHOL USE: Status: ACTIVE | Noted: 2021-12-10

## 2025-02-12 ENCOUNTER — APPOINTMENT (OUTPATIENT)
Dept: RHEUMATOLOGY | Facility: CLINIC | Age: 89
End: 2025-02-12

## 2025-07-30 ENCOUNTER — APPOINTMENT (OUTPATIENT)
Dept: RHEUMATOLOGY | Facility: CLINIC | Age: 89
End: 2025-07-30
Payer: MEDICARE

## 2025-07-30 VITALS
HEART RATE: 72 BPM | BODY MASS INDEX: 21.31 KG/M2 | DIASTOLIC BLOOD PRESSURE: 72 MMHG | OXYGEN SATURATION: 99 % | HEIGHT: 58 IN | WEIGHT: 101.5 LBS | SYSTOLIC BLOOD PRESSURE: 133 MMHG

## 2025-07-30 DIAGNOSIS — M81.0 AGE-RELATED OSTEOPOROSIS W/OUT CURRENT PATHOLOGICAL FRACTURE: ICD-10-CM

## 2025-07-30 DIAGNOSIS — M11.20 OTHER CHONDROCALCINOSIS, UNSPECIFIED SITE: ICD-10-CM

## 2025-07-30 PROCEDURE — 99214 OFFICE O/P EST MOD 30 MIN: CPT

## 2025-07-30 PROCEDURE — G2211 COMPLEX E/M VISIT ADD ON: CPT

## 2025-07-30 PROCEDURE — 36415 COLL VENOUS BLD VENIPUNCTURE: CPT

## 2025-07-31 LAB
25(OH)D3 SERPL-MCNC: 85.1 NG/ML
ANION GAP SERPL CALC-SCNC: 16 MMOL/L
BUN SERPL-MCNC: 25 MG/DL
CALCIUM SERPL-MCNC: 10 MG/DL
CHLORIDE SERPL-SCNC: 101 MMOL/L
CO2 SERPL-SCNC: 23 MMOL/L
CREAT SERPL-MCNC: 1.03 MG/DL
EGFRCR SERPLBLD CKD-EPI 2021: 51 ML/MIN/1.73M2
GLUCOSE SERPL-MCNC: 79 MG/DL
POTASSIUM SERPL-SCNC: 5 MMOL/L
SODIUM SERPL-SCNC: 140 MMOL/L

## 2025-09-03 ENCOUNTER — APPOINTMENT (OUTPATIENT)
Dept: RHEUMATOLOGY | Facility: CLINIC | Age: 89
End: 2025-09-03

## 2025-09-03 VITALS
TEMPERATURE: 98.1 F | OXYGEN SATURATION: 95 % | DIASTOLIC BLOOD PRESSURE: 70 MMHG | SYSTOLIC BLOOD PRESSURE: 132 MMHG | HEART RATE: 66 BPM | RESPIRATION RATE: 15 BRPM

## 2025-09-03 DIAGNOSIS — M81.0 AGE-RELATED OSTEOPOROSIS W/OUT CURRENT PATHOLOGICAL FRACTURE: ICD-10-CM

## 2025-09-03 PROCEDURE — 96372 THER/PROPH/DIAG INJ SC/IM: CPT

## 2025-09-03 RX ORDER — DENOSUMAB 60 MG/ML
60 INJECTION SUBCUTANEOUS
Qty: 1 | Refills: 0 | Status: COMPLETED | OUTPATIENT
Start: 2025-08-28